# Patient Record
Sex: FEMALE | Race: OTHER | HISPANIC OR LATINO | Employment: UNEMPLOYED | URBAN - METROPOLITAN AREA
[De-identification: names, ages, dates, MRNs, and addresses within clinical notes are randomized per-mention and may not be internally consistent; named-entity substitution may affect disease eponyms.]

---

## 2018-01-10 NOTE — RESULT NOTES
Verified Results  * XR KNEE 4+ VIEW LEFT 92Lmg0810 01:43PM Shy Hearn Order Number: [id number]     Test Name Result Flag Reference   XR KNEE 4+ VW LEFT (Report)     LEFT KNEE     INDICATION: Left knee swelling     COMPARISON: None     VIEWS: AP, lateral, tunnel and sunrise; 4 images     FINDINGS:     There is no acute fracture or dislocation  Small joint effusion noted  No degenerative changes  No lytic or blastic lesions are seen  Soft tissues are unremarkable  IMPRESSION:     No acute osseous abnormality  Small joint effusion  Workstation performed: [id number]     Signed by:   Maritza Anderson MD   8/4/16

## 2018-01-16 NOTE — RESULT NOTES
Verified Results  (1) SYNOVIAL FLUID CRYSTALS 03Aug2016 03:23PM Mildred Dc   TW Order Number: [id number]     Test Name Result Flag Reference   SYN FL CRYST No Crystals Seen  No Crystals Seen     (1) SYNOVIAL FLUID RBCS 03Aug2016 03:23PM Ny Danger Order Number: [id number]     Test Name Result Flag Reference   RBC, SYNOVIAL 27562 H 0-10     (1) WBC AND DIFF,SYNOVIAL FLUID 03Aug2016 03:23PM Ny Danger Order Number: [id number]     Test Name Result Flag Reference   WBC FLUID 8810 /ul H 0-200   TOTAL COUNTED 100     Neutrophil % (synovial) 83 %     Lymph % (Synovial) 13 %     Monocyte % (Synovial) 4 %       (1) CULTURE, BODY FLUID 03Aug2016 03:23PM Ny Danger Order Number: [id number]     Test Name Result Flag Reference   CLINICAL REPORT (Report)     Test:        Body fluid culture  Specimen Type: Body Fluid  Specimen Date:   8/3/2016 3:23 PM  Result Date:    8/6/2016 8:19 AM  Result Status:   Final result  Resulting Lab:   [place] [address]            Tel: [phone]      CULTURE                                       ------------------                                   No growth      STAIN                                        ------------------                                   1+ Polys    No bacteria seen     (1) LYME PCR 03Aug2016 03:23PM Ny Danger Order Number: [id number]     Test Name Result Flag Reference   LYME PCR SP RES Negative  Negative   No B  burgdorferi DNA Detected  This test was developed and its performance characteristics determined  by [facility]   It has not been cleared or approved by the Food and Drug  Administration  The FDA has determined that such clearance or  approval is not necessary      Performed at:  [address]  [id number]  : Donald Tang MD, Phone:  [phone]

## 2018-12-13 ENCOUNTER — APPOINTMENT (OUTPATIENT)
Dept: URGENT CARE | Facility: MEDICAL CENTER | Age: 23
End: 2018-12-13

## 2018-12-13 ENCOUNTER — TRANSCRIBE ORDERS (OUTPATIENT)
Dept: URGENT CARE | Facility: MEDICAL CENTER | Age: 23
End: 2018-12-13

## 2018-12-13 ENCOUNTER — APPOINTMENT (OUTPATIENT)
Dept: LAB | Facility: MEDICAL CENTER | Age: 23
End: 2018-12-13

## 2018-12-13 DIAGNOSIS — Z02.1 PRE-EMPLOYMENT HEALTH SCREENING EXAMINATION: ICD-10-CM

## 2018-12-13 DIAGNOSIS — Z02.1 PRE-EMPLOYMENT HEALTH SCREENING EXAMINATION: Primary | ICD-10-CM

## 2018-12-13 LAB — RUBV IGG SERPL IA-ACNC: 91.5 IU/ML

## 2018-12-13 PROCEDURE — 90715 TDAP VACCINE 7 YRS/> IM: CPT

## 2018-12-13 PROCEDURE — 86735 MUMPS ANTIBODY: CPT

## 2018-12-13 PROCEDURE — 86765 RUBEOLA ANTIBODY: CPT

## 2018-12-13 PROCEDURE — 86762 RUBELLA ANTIBODY: CPT

## 2018-12-13 PROCEDURE — 86787 VARICELLA-ZOSTER ANTIBODY: CPT

## 2018-12-13 PROCEDURE — 86480 TB TEST CELL IMMUN MEASURE: CPT

## 2018-12-13 PROCEDURE — 36415 COLL VENOUS BLD VENIPUNCTURE: CPT

## 2018-12-13 PROCEDURE — 90686 IIV4 VACC NO PRSV 0.5 ML IM: CPT

## 2018-12-17 LAB
GAMMA INTERFERON BACKGROUND BLD IA-ACNC: 0.01 IU/ML
M TB IFN-G BLD-IMP: NEGATIVE
M TB IFN-G CD4+ BCKGRND COR BLD-ACNC: 0 IU/ML
M TB IFN-G CD4+ BCKGRND COR BLD-ACNC: 0 IU/ML
MITOGEN IGNF BCKGRD COR BLD-ACNC: >10 IU/ML
VZV IGG SER IA-ACNC: ABNORMAL

## 2018-12-18 LAB
MEV IGG SER QL: NORMAL
MUV IGG SER QL: NORMAL

## 2019-03-05 ENCOUNTER — OFFICE VISIT (OUTPATIENT)
Dept: URGENT CARE | Facility: MEDICAL CENTER | Age: 24
End: 2019-03-05
Payer: COMMERCIAL

## 2019-03-05 VITALS
HEIGHT: 65 IN | RESPIRATION RATE: 16 BRPM | OXYGEN SATURATION: 98 % | SYSTOLIC BLOOD PRESSURE: 120 MMHG | WEIGHT: 134.8 LBS | BODY MASS INDEX: 22.46 KG/M2 | DIASTOLIC BLOOD PRESSURE: 56 MMHG | HEART RATE: 75 BPM | TEMPERATURE: 96.9 F

## 2019-03-05 DIAGNOSIS — Z02.4 DRIVER'S PERMIT PHYSICAL EXAMINATION: Primary | ICD-10-CM

## 2019-03-06 NOTE — PATIENT INSTRUCTIONS
Normal 's permit physical exam   Patient's form completed with no restrictions at this time  Normal Exam   WHAT YOU NEED TO KNOW:   Your healthcare provider did not find a reason for your symptoms today  You may need to follow up with your healthcare provider or a specialist  He will work with you to try to find the cause of your symptoms  He may also run tests to find out more about your overall health  DISCHARGE INSTRUCTIONS:   Follow up with your healthcare provider or a specialist as directed:  Tell your healthcare provider about your symptoms  You may be given a complete physical exam and health checkup  Write down your questions so you remember to ask them during your visits  Maintain a healthy lifestyle:  Healthy foods and regular physical activity can improve your health  They also decrease your risk of heart disease, high blood pressure, and diabetes  · Get 30 minutes of activity every day  most days of the week  Ask your healthcare provider which activities are best for you  You can do 30 minutes at once or spread your activity throughout the day to get the recommended amount  · Eat a variety of healthy foods  Healthy foods include whole-grain breads, low-fat dairy products, beans, lean meats, and fish  Eat fruits and vegetables every day, especially those that are green, orange, and red  · Maintain a healthy weight  Ask your healthcare provider how much you should weigh  Ask him to help you create a weight loss plan if you are overweight  · Limit alcohol  Women should limit alcohol to 1 drink a day  Men should limit alcohol to 2 drinks a day  A drink of alcohol is 12 ounces of beer, 5 ounces of wine, or 1½ ounces of liquor  Do not smoke: If you smoke, it is never too late to quit  You lower your risk for many health problems if you quit  Ask your healthcare provider for information if you need help quitting     Contact your healthcare provider if:   · Your symptoms get worse, or you have new symptoms that bother you  · You have questions or concerns about your condition or care  · Your illness makes it difficult to follow a healthy diet  Return to the emergency department if:   · You have trouble breathing  · You have chest pain  · You feel lightheaded or faint  © 2017 [employer] Information is for End User's use only and may not be sold, redistributed or otherwise used for commercial purposes  All illustrations and images included in CareNotes® are the copyrighted property of [email or web address] , [employer]  or [employer]  The above information is an  only  It is not intended as medical advice for individual conditions or treatments  Talk to your doctor, nurse or pharmacist before following any medical regimen to see if it is safe and effective for you

## 2019-03-06 NOTE — PROGRESS NOTES
[address] [facility]        NAME: Pascual Galvan is a 26 y o  female  : 1995    MRN: [id number]  DATE: 2019  TIME: 7:46 PM    Assessment and Plan   's permit physical examination [Z02 4]  1  's permit physical examination           Patient Instructions       Follow up with PCP in 3-5 days  Proceed to  ER if symptoms worsen  Chief Complaint     Chief Complaint   Patient presents with    Annual Exam     Patient is here for 's permit physical exam          History of Present Illness       Patient here for 's permit physical exam   Denies any complaints  No medical problems  Review of Systems   Review of Systems   Constitutional: Negative  Respiratory: Negative  Cardiovascular: Negative  Neurological: Negative  All other systems reviewed and are negative  Current Medications     No current outpatient medications on file  Current Allergies     Allergies as of 2019    (No Known Allergies)            The following portions of the patient's history were reviewed and updated as appropriate: allergies, current medications, past family history, past medical history, past social history, past surgical history and problem list      History reviewed  No pertinent past medical history  Past Surgical History:   Procedure Laterality Date    SPINAL FUSION         No family history on file  Medications have been verified  Objective   /56   Pulse 75   Temp (!) 96 9 °F (36 1 °C) (Tympanic)   Resp 16   Ht 5' 5" (1 651 m)   Wt 61 1 kg (134 lb 12 8 oz)   LMP 2019   SpO2 98%   BMI 22 43 kg/m²        Physical Exam     Physical Exam   Constitutional: She is oriented to person, place, and time  Eyes: Pupils are equal, round, and reactive to light  EOM are normal    Cardiovascular: Normal rate, regular rhythm and normal heart sounds     Pulmonary/Chest: Effort normal and breath sounds normal    Neurological: She is alert and oriented to person, place, and time  Nursing note and vitals reviewed

## 2019-06-10 ENCOUNTER — APPOINTMENT (EMERGENCY)
Dept: RADIOLOGY | Facility: HOSPITAL | Age: 24
End: 2019-06-10
Payer: COMMERCIAL

## 2019-06-10 ENCOUNTER — HOSPITAL ENCOUNTER (EMERGENCY)
Facility: HOSPITAL | Age: 24
Discharge: HOME | End: 2019-06-10
Attending: EMERGENCY MEDICINE
Payer: COMMERCIAL

## 2019-06-10 VITALS
WEIGHT: 134.7 LBS | BODY MASS INDEX: 22.44 KG/M2 | OXYGEN SATURATION: 98 % | TEMPERATURE: 97.7 F | RESPIRATION RATE: 18 BRPM | HEART RATE: 74 BPM | SYSTOLIC BLOOD PRESSURE: 111 MMHG | DIASTOLIC BLOOD PRESSURE: 65 MMHG | HEIGHT: 65 IN

## 2019-06-10 DIAGNOSIS — M25.551 RIGHT HIP PAIN: Primary | ICD-10-CM

## 2019-06-10 LAB — EXT PREG TEST URINE: NEGATIVE

## 2019-06-10 PROCEDURE — 99284 EMERGENCY DEPT VISIT MOD MDM: CPT

## 2019-06-10 PROCEDURE — 81025 URINE PREGNANCY TEST: CPT | Performed by: EMERGENCY MEDICINE

## 2019-06-10 PROCEDURE — 99284 EMERGENCY DEPT VISIT MOD MDM: CPT | Performed by: EMERGENCY MEDICINE

## 2019-06-10 PROCEDURE — 73502 X-RAY EXAM HIP UNI 2-3 VIEWS: CPT

## 2019-06-10 PROCEDURE — 96372 THER/PROPH/DIAG INJ SC/IM: CPT

## 2019-06-10 PROCEDURE — 73700 CT LOWER EXTREMITY W/O DYE: CPT

## 2019-06-10 RX ORDER — ACETAMINOPHEN 325 MG/1
975 TABLET ORAL ONCE
Status: COMPLETED | OUTPATIENT
Start: 2019-06-10 | End: 2019-06-10

## 2019-06-10 RX ORDER — KETOROLAC TROMETHAMINE 30 MG/ML
15 INJECTION, SOLUTION INTRAMUSCULAR; INTRAVENOUS ONCE
Status: COMPLETED | OUTPATIENT
Start: 2019-06-10 | End: 2019-06-10

## 2019-06-10 RX ORDER — NAPROXEN 375 MG/1
375 TABLET ORAL 2 TIMES DAILY WITH MEALS
Qty: 20 TABLET | Refills: 0 | Status: SHIPPED | OUTPATIENT
Start: 2019-06-10 | End: 2019-08-26 | Stop reason: DRUGHIGH

## 2019-06-10 RX ADMIN — KETOROLAC TROMETHAMINE 15 MG: 30 INJECTION, SOLUTION INTRAMUSCULAR at 21:05

## 2019-06-10 RX ADMIN — ACETAMINOPHEN 975 MG: 325 TABLET ORAL at 20:26

## 2019-06-19 ENCOUNTER — TELEPHONE (OUTPATIENT)
Dept: OBGYN CLINIC | Facility: HOSPITAL | Age: 24
End: 2019-06-19

## 2019-06-19 NOTE — TELEPHONE ENCOUNTER
Caller: Apollo Patton  Call back Number: [phone]  Provider: Dr Taryn Oropeza called the phone room and stated she will drop off Medical records for her Right Hip SX before her appointment with Dr Edmund Peterson on July 9th  Ash Haro could not remember exactly where the place was called but will be getting them from that office this weekend  Ash Haro works in the same [employer] and stated she will just drop them off when she has them to Ortho office blanca Mckeon for Dr Melisa Khoury

## 2019-07-09 ENCOUNTER — OFFICE VISIT (OUTPATIENT)
Dept: OBGYN CLINIC | Facility: HOSPITAL | Age: 24
End: 2019-07-09
Payer: COMMERCIAL

## 2019-07-09 ENCOUNTER — HOSPITAL ENCOUNTER (OUTPATIENT)
Dept: RADIOLOGY | Facility: HOSPITAL | Age: 24
Discharge: HOME | End: 2019-07-09
Attending: ORTHOPAEDIC SURGERY
Payer: COMMERCIAL

## 2019-07-09 VITALS
BODY MASS INDEX: 22.79 KG/M2 | HEIGHT: 65 IN | SYSTOLIC BLOOD PRESSURE: 112 MMHG | WEIGHT: 136.8 LBS | DIASTOLIC BLOOD PRESSURE: 73 MMHG | HEART RATE: 86 BPM

## 2019-07-09 DIAGNOSIS — M25.562 ACUTE PAIN OF LEFT KNEE: Primary | ICD-10-CM

## 2019-07-09 DIAGNOSIS — M25.562 ACUTE PAIN OF LEFT KNEE: ICD-10-CM

## 2019-07-09 DIAGNOSIS — M25.462 EFFUSION OF LEFT KNEE: ICD-10-CM

## 2019-07-09 PROCEDURE — 20610 DRAIN/INJ JOINT/BURSA W/O US: CPT | Performed by: PHYSICIAN ASSISTANT

## 2019-07-09 PROCEDURE — 99213 OFFICE O/P EST LOW 20 MIN: CPT | Performed by: ORTHOPAEDIC SURGERY

## 2019-07-09 PROCEDURE — 73562 X-RAY EXAM OF KNEE 3: CPT

## 2019-07-09 RX ORDER — BUPIVACAINE HYDROCHLORIDE 2.5 MG/ML
2 INJECTION, SOLUTION INFILTRATION; PERINEURAL
Status: COMPLETED | OUTPATIENT
Start: 2019-07-09 | End: 2019-07-09

## 2019-07-09 RX ADMIN — BUPIVACAINE HYDROCHLORIDE 2 ML: 2.5 INJECTION, SOLUTION INFILTRATION; PERINEURAL at 16:24

## 2019-07-09 NOTE — PROGRESS NOTES
Orthopedics          Donnamarie Babinski 22 y o  female MRN: [id number]      Chief Complaint:   left knee pain    HPI:   22 y  o female complaining of left knee pain  Patient presents office today regarding left knee pain and swelling  Patient has noticed increasing swelling and pain in her left knee over the past month's time  Patient states having pain stiffness in her left knee  She does have a history of knee effusion 4 years ago she had aspirated she states she is not any issues with her left knee until recently  She has limited motion in her left knee as well as pain with weight-bearing clicking popping and occasional locking symptoms of her left knee  She denies any radiation pain denies any numbness tingling left lower extremity denies any fevers chills  Review Of Systems:   · Skin: Normal  · Neuro: See HPI  · Musculoskeletal: See HPI  · All other systems reviewed and are negative    Past Medical History:   History reviewed  No pertinent past medical history      Past Surgical History:   Past Surgical History:   Procedure Laterality Date    HIP SURGERY      SPINAL FUSION  2010       Family History:  Family history reviewed and non-contributory  Family History   Problem Relation Age of Onset    No Known Problems Mother     Hypertension Father          Social History:  Social History     Socioeconomic History    Marital status: Single     Spouse name: None    Number of children: None    Years of education: None    Highest education level: None   Occupational History    None   Social Needs    Financial resource strain: None    Food insecurity:     Worry: None     Inability: None    Transportation needs:     Medical: None     Non-medical: None   Tobacco Use    Smoking status: Never Smoker    Smokeless tobacco: Never Used   Substance and Sexual Activity    Alcohol use: Yes     Comment: occasional    Drug use: Not Currently    Sexual activity: None   Lifestyle    Physical activity:     Days per week: None     Minutes per session: None    Stress: None   Relationships    Social connections:     Talks on phone: None     Gets together: None     Attends Congregation service: None     Active member of club or organization: None     Attends meetings of clubs or organizations: None     Relationship status: None    Intimate partner violence:     Fear of current or ex partner: None     Emotionally abused: None     Physically abused: None     Forced sexual activity: None   Other Topics Concern    None   Social History Narrative    None       Allergies:   No Known Allergies    Labs:  No results found for: HCT, HGB, PT, INR, WBC, ESR, CRP    Meds:    Current Outpatient Medications:     naproxen (NAPROSYN) 375 mg tablet, Take 1 tablet (375 mg total) by mouth 2 (two) times a day with meals, Disp: 20 tablet, Rfl: 0      Physical Exam:     General Appearance:    Alert, cooperative, no distress, appears stated age   Head:    Normocephalic, without obvious abnormality, atraumatic   Eyes:    conjunctiva/corneas clear, both eyes        Nose:   Nares normal, septum midline, no drainage    Throat:   Lips normal; teeth and gums normal   Neck:    symmetrical, trachea midline, ;     thyroid:  no enlargement/   Back:     Symmetric, no curvature, ROM normal   Lungs:   No audible wheezing or labored breathing   Chest Wall:    No tenderness or deformity    Heart:    Regular rate and rhythm               Pulses:   2+ and symmetric all extremities   Skin:   Skin color, texture, turgor normal, no rashes or lesions   Neurologic:   normal strength, sensation and reflexes     throughout       Musculoskeletal: left lower extremity  · Prior to aspiration On examination of the left knee there is a large effusion no erythema no laceration no abrasion no ecchymosis  Range of motion lacking 10° of extension flexion to 120°    There is no pain on palpation medial and lateral joint lines, pes anserine bursa region, distal iliotibial band  No pain or laxity with varus or valgus stress  Quadriceps, hamstring strength 5/5  Sensation intact, distal pulses present  Radiology:   I personally reviewed the films  X-rays left knee shows no evidence of osseous abnormality    Large joint arthrocentesis: L knee  Date/Time: 7/9/2019 4:24 PM  Consent given by: patient  Supporting Documentation  Indications: pain   Procedure Details  Location: knee - L knee  Needle size: 22 G  Ultrasound guidance: no  Approach: superior  Medications administered: 2 mL bupivacaine 0 25 %    Aspirate amount: 35 mL  Aspirate: clear and yellow  Analysis: fluid sample sent for laboratory analysis    Patient tolerance: patient tolerated the procedure well with no immediate complications  Dressing:  Sterile dressing applied            _*_*_*_*_*_*_*_*_*_*_*_*_*_*_*_*_*_*_*_*_*_*_*_*_*_*_*_*_*_*_*_*_*_*_*_*_*_*_*_*_*    Assessment:  22 y  o female with left knee pain and effusion    Plan:   · Weight bearing as tolerated  left lower extremity  · Left knee aspiration injection of bupivacaine given as noted above  · Patient advised should they develop any increasing pain, redness, drainage, numbness, tingling or swelling surrounding the injection sight, they are to contact our office or present to the emergency department    · Fluid sent for laboratory analysis Gram stain, culture, anaerobi,c aerobic, fungal, AFB  · MRI of left knee ordered  · Follow up when testing is complete

## 2019-07-10 ENCOUNTER — LAB REQUISITION (OUTPATIENT)
Dept: LAB | Facility: HOSPITAL | Age: 24
End: 2019-07-10
Payer: COMMERCIAL

## 2019-07-10 DIAGNOSIS — M25.462 EFFUSION OF LEFT KNEE: ICD-10-CM

## 2019-07-10 LAB
CRYSTALS SNV QL MICRO: NORMAL
LYMPHOCYTES # SNV MANUAL: 12 %
MONOCYTES NFR SNV MANUAL: 15 %
NEUTROPHILS NFR SNV MANUAL: 73 %
RBC # SNV MANUAL: ABNORMAL /UL (ref 0–10)
TOTAL CELLS COUNTED SPEC: 100
WBC # FLD MANUAL: NORMAL /UL

## 2019-07-10 PROCEDURE — 87070 CULTURE OTHR SPECIMN AEROBIC: CPT | Performed by: ORTHOPAEDIC SURGERY

## 2019-07-10 PROCEDURE — 87075 CULTR BACTERIA EXCEPT BLOOD: CPT | Performed by: ORTHOPAEDIC SURGERY

## 2019-07-10 PROCEDURE — 89051 BODY FLUID CELL COUNT: CPT | Performed by: ORTHOPAEDIC SURGERY

## 2019-07-10 PROCEDURE — 87205 SMEAR GRAM STAIN: CPT | Performed by: ORTHOPAEDIC SURGERY

## 2019-07-10 PROCEDURE — 89060 EXAM SYNOVIAL FLUID CRYSTALS: CPT | Performed by: ORTHOPAEDIC SURGERY

## 2019-07-13 LAB
BACTERIA SPEC ANAEROBE CULT: NO GROWTH
BACTERIA SPEC BFLD CULT: NO GROWTH
GRAM STN SPEC: NORMAL
GRAM STN SPEC: NORMAL

## 2019-07-16 ENCOUNTER — OFFICE VISIT (OUTPATIENT)
Dept: OBGYN CLINIC | Facility: CLINIC | Age: 24
End: 2019-07-16
Payer: COMMERCIAL

## 2019-07-16 VITALS
HEART RATE: 101 BPM | BODY MASS INDEX: 22.56 KG/M2 | OXYGEN SATURATION: 96 % | HEIGHT: 65 IN | SYSTOLIC BLOOD PRESSURE: 126 MMHG | DIASTOLIC BLOOD PRESSURE: 78 MMHG | WEIGHT: 135.4 LBS

## 2019-07-16 DIAGNOSIS — N92.6 IRREGULAR MENSES: Primary | ICD-10-CM

## 2019-07-16 DIAGNOSIS — R30.0 DYSURIA: ICD-10-CM

## 2019-07-16 DIAGNOSIS — L68.0 HIRSUTISM: ICD-10-CM

## 2019-07-16 DIAGNOSIS — R10.2 PELVIC PAIN: ICD-10-CM

## 2019-07-16 PROBLEM — G89.29 CHRONIC RIGHT HIP PAIN: Status: ACTIVE | Noted: 2019-07-16

## 2019-07-16 PROBLEM — M25.551 CHRONIC RIGHT HIP PAIN: Status: ACTIVE | Noted: 2019-07-16

## 2019-07-16 LAB
SL AMB  POCT GLUCOSE, UA: NEGATIVE
SL AMB LEUKOCYTE ESTERASE,UA: NEGATIVE
SL AMB POCT BILIRUBIN,UA: NEGATIVE
SL AMB POCT BLOOD,UA: NEGATIVE
SL AMB POCT CLARITY,UA: NORMAL
SL AMB POCT COLOR,UA: YELLOW
SL AMB POCT KETONES,UA: NEGATIVE
SL AMB POCT NITRITE,UA: NEGATIVE
SL AMB POCT PH,UA: 7.5
SL AMB POCT SPECIFIC GRAVITY,UA: 1.01
SL AMB POCT URINE PROTEIN: NEGATIVE
SL AMB POCT UROBILINOGEN: 0.2

## 2019-07-16 PROCEDURE — 87147 CULTURE TYPE IMMUNOLOGIC: CPT | Performed by: OBSTETRICS & GYNECOLOGY

## 2019-07-16 PROCEDURE — 87086 URINE CULTURE/COLONY COUNT: CPT | Performed by: OBSTETRICS & GYNECOLOGY

## 2019-07-16 PROCEDURE — 81002 URINALYSIS NONAUTO W/O SCOPE: CPT | Performed by: OBSTETRICS & GYNECOLOGY

## 2019-07-16 PROCEDURE — 99203 OFFICE O/P NEW LOW 30 MIN: CPT | Performed by: OBSTETRICS & GYNECOLOGY

## 2019-07-16 NOTE — PROGRESS NOTES
Patient is a 22 y o  No obstetric history on file  with Patient's last menstrual period was 2019 (exact date)  who presents requesting evaluation of pelvic pain  Pt reports she used to take OCPS until 2018  She reports in 2018 she developed right sided pelvic pain and it has been getting worse since that time  She reports she notes increased weight gain, facial hair, mood swings and irregular menses  She reports she had a menses in February that only consisted of menses and she did not have any menses until July  She reports that originally she went on the pill in  as pregnancy prevention  She reports she stopped it because she felt depressed on the pill  Pt reports menarche at age 16  She reports her menses were irregular for the first year and then they became regular  She reports monthly menses until 2018  Pt reports she never had facial hair prior to this as well  She reports that the pain on her right side is intermittent and it is sharp and stabbing in nature  She reports that it is exacerbating by increased activity and she has to urinate  She has not had any prior evaluation  With regards to the hair growth, pt reports she has coarse hair on her inner thighs  She reports notes increased chest hair and abdominal hair as well  This is all new since October  She reports she has had to change the way she dresses to cover the hair  She reports she waxes the hair to remove it  She also reports she has had an increase in acne and it is very cystic in nature       Past Medical History:   Diagnosis Date    Knee effusion, left     Need for HPV vaccination     completed series    Scoliosis     Varicella vaccination        Past Surgical History:   Procedure Laterality Date    HIP SURGERY Right     removal of benign bony growth    SPINAL FUSION         OB History    Para Term  AB Living   0 0 0 0 0 0   SAB TAB Ectopic Multiple Live Births   0 0 0 0 0   Obstetric Comments   Menarche: 16           Current Outpatient Medications:     naproxen (NAPROSYN) 375 mg tablet, Take 1 tablet (375 mg total) by mouth 2 (two) times a day with meals, Disp: 20 tablet, Rfl: 0    No Known Allergies    Social History     Socioeconomic History    Marital status: Single     Spouse name: None    Number of children: 0    Years of education: currently in college    Highest education level: High school graduate   Occupational History    Occupation: Nutrition Services   Social Needs    Financial resource strain: None    Food insecurity:     Worry: None     Inability: None    Transportation needs:     Medical: None     Non-medical: None   Tobacco Use    Smoking status: Never Smoker    Smokeless tobacco: Never Used   Substance and Sexual Activity    Alcohol use: Yes     Frequency: Monthly or less     Comment: 1-2x/year    Drug use: Not Currently     Types: Marijuana    Sexual activity: Yes     Partners: Male     Birth control/protection: Condom     Comment: lifetime partners: 5; current partner: 2015   Lifestyle    Physical activity:     Days per week: None     Minutes per session: None    Stress: None   Relationships    Social connections:     Talks on phone: None     Gets together: None     Attends Church service: None     Active member of club or organization: None     Attends meetings of clubs or organizations: None     Relationship status: None    Intimate partner violence:     Fear of current or ex partner: None     Emotionally abused: None     Physically abused: None     Forced sexual activity: None   Other Topics Concern    None   Social History Narrative    Anglican: No preference    Accepts blood products       Family History   Problem Relation Age of Onset    Other Mother         pre-diabetes    Hypertension Father     No Known Problems Sister     No Known Problems Brother     Emphysema Maternal Grandfather     No Known Problems Paternal Grandmother  Other Paternal Grandfather         going blind and deaf    Breast cancer Neg Hx     Ovarian cancer Neg Hx     Colon cancer Neg Hx        Review of Systems   Constitutional: Positive for unexpected weight change  Negative for chills, fatigue and fever  HENT: Negative for congestion, mouth sores and sore throat  Respiratory: Negative for cough, chest tightness, shortness of breath and wheezing  Cardiovascular: Negative for chest pain and palpitations  Gastrointestinal: Negative for abdominal distention, abdominal pain, constipation, diarrhea, nausea and vomiting  Endocrine: Negative for cold intolerance and heat intolerance  Genitourinary: Positive for menstrual problem  Negative for dyspareunia, dysuria, genital sores, pelvic pain, vaginal bleeding, vaginal discharge and vaginal pain  Musculoskeletal: Negative for arthralgias  Skin: Negative for color change and rash  Neurological: Negative for dizziness, light-headedness and headaches  Hematological: Negative for adenopathy  Blood pressure 126/78, pulse 101, height 5' 5" (1 651 m), weight 61 4 kg (135 lb 6 4 oz), last menstrual period 07/06/2019, SpO2 96 %, not currently breastfeeding  and Body mass index is 22 53 kg/m²  Physical Exam   Constitutional: She is oriented to person, place, and time  She appears well-developed and well-nourished  HENT:   Head: Normocephalic and atraumatic  Eyes: Conjunctivae and EOM are normal    Neck: Normal range of motion  Pulmonary/Chest: Effort normal    Abdominal: Soft  Bowel sounds are normal  She exhibits no distension and no mass  There is no tenderness  There is no rebound and no guarding  Musculoskeletal: Normal range of motion  She exhibits no edema or tenderness  Neurological: She is alert and oriented to person, place, and time  Skin: Skin is warm  No rash noted  No erythema  Psychiatric: She has a normal mood and affect   Her behavior is normal  Judgment and thought content normal      Breasts with fine dark hair, abdomen with fine dark hair--no evidence of hirsutism in these areas  Thick coarse hairs on inner and back of thighs  Facial hair removed by patient prior to arrival     vulva: normal external genitalia for age and no lesions, masses, epithelial changes, or exudate  vagina: color pink and rugae  well formed rugae  cervix: nullip and no lesions   uterus: NSSC, AF, NT, mobile  adnexa: no masses or tenderness      A/P:  Pt is a 22 y o  No obstetric history on file  with      Diagnoses and all orders for this visit:    Irregular menses  -     17-Hydroxyprogesterone; Future  -     DHEA-sulfate; Future  -     Estradiol; Future  -     Follicle stimulating hormone; Future  -     Glucose CARMELA 2HR 75GM Nonpreg; Future  -     Lipid panel; Future  -     Luteinizing hormone; Future  -     Prolactin; Future  -     Sex Hormone Binding Globulin; Future  -     Testosterone, free, total; Future  -     TSH, 3rd generation with Free T4 reflex; Future  -     US pelvis complete w transvaginal; Future    Hirsutism  -     17-Hydroxyprogesterone; Future  -     DHEA-sulfate; Future  -     Estradiol; Future  -     Follicle stimulating hormone; Future  -     Glucose CARMELA 2HR 75GM Nonpreg; Future  -     Lipid panel; Future  -     Luteinizing hormone; Future  -     Prolactin; Future  -     Sex Hormone Binding Globulin; Future  -     Testosterone, free, total; Future  -     TSH, 3rd generation with Free T4 reflex; Future    Pelvic pain  -     US pelvis complete w transvaginal; Future    Dysuria  -     Urine culture  -     POCT urine dip      Follow up in 2 weeks to review results and discuss management options

## 2019-07-18 LAB
BACTERIA UR CULT: ABNORMAL
BACTERIA UR CULT: ABNORMAL

## 2019-07-22 ENCOUNTER — HOSPITAL ENCOUNTER (OUTPATIENT)
Dept: RADIOLOGY | Facility: HOSPITAL | Age: 24
Discharge: HOME | End: 2019-07-22
Attending: PHYSICIAN ASSISTANT
Payer: COMMERCIAL

## 2019-07-22 DIAGNOSIS — M25.462 EFFUSION OF LEFT KNEE: ICD-10-CM

## 2019-07-22 DIAGNOSIS — M25.562 ACUTE PAIN OF LEFT KNEE: ICD-10-CM

## 2019-07-22 PROCEDURE — 73721 MRI JNT OF LWR EXTRE W/O DYE: CPT

## 2019-07-25 ENCOUNTER — TRANSCRIBE ORDERS (OUTPATIENT)
Dept: RADIOLOGY | Facility: HOSPITAL | Age: 24
End: 2019-07-25

## 2019-07-25 ENCOUNTER — HOSPITAL ENCOUNTER (OUTPATIENT)
Dept: RADIOLOGY | Facility: HOSPITAL | Age: 24
Discharge: HOME | End: 2019-07-25
Attending: OBSTETRICS & GYNECOLOGY
Payer: COMMERCIAL

## 2019-07-25 DIAGNOSIS — R10.2 PELVIC PAIN: ICD-10-CM

## 2019-07-25 DIAGNOSIS — N92.6 IRREGULAR MENSES: ICD-10-CM

## 2019-07-25 PROCEDURE — 76856 US EXAM PELVIC COMPLETE: CPT

## 2019-08-01 ENCOUNTER — OFFICE VISIT (OUTPATIENT)
Dept: OBGYN CLINIC | Facility: HOSPITAL | Age: 24
End: 2019-08-01
Payer: COMMERCIAL

## 2019-08-01 VITALS
HEART RATE: 73 BPM | HEIGHT: 65 IN | WEIGHT: 135 LBS | DIASTOLIC BLOOD PRESSURE: 75 MMHG | BODY MASS INDEX: 22.49 KG/M2 | SYSTOLIC BLOOD PRESSURE: 109 MMHG

## 2019-08-01 DIAGNOSIS — M25.562 ACUTE PAIN OF LEFT KNEE: ICD-10-CM

## 2019-08-01 DIAGNOSIS — M25.462 EFFUSION OF LEFT KNEE: Primary | ICD-10-CM

## 2019-08-01 PROCEDURE — 99213 OFFICE O/P EST LOW 20 MIN: CPT | Performed by: ORTHOPAEDIC SURGERY

## 2019-08-01 NOTE — PROGRESS NOTES
Assessment:    Left knee inflammatory arthritis    Plan:     Weight Bearing  as Tolerated   Patient referred to Rheumatology   Continue wearing knee sleeve for comfort   If symptoms worsen, I did explain to patient that she may be candidate for steroid injection   Continued continue taking Aleve p r n  For pain   Follow-up p r n  The above stated was discussed in layman's terms and the patient expressed understanding  All questions were answered to the patient's satisfaction  Subjective:   Bianca Moralez is a 22 y o  female who presents left knee pain  Patient is here today to discuss MRI of left knee  Patient states left knee is getting worse  She states she is having pain over the knee anterior lateral and posterior left knee  She does note  Increased swelling     Denies any instability or locking  Pain is worse with prolonged standing, walking, and sitting  Patient is taking Aleve p r n  For pain  Patient is wearing a knee sleeve compression brace with relief        Review of systems negative unless otherwise specified in HPI    Past Medical History:   Diagnosis Date    Knee effusion, left     Need for HPV vaccination 2012    completed series    Scoliosis     Varicella vaccination        Past Surgical History:   Procedure Laterality Date    HIP SURGERY Right     removal of benign bony growth    SPINAL FUSION  2010       Family History   Problem Relation Age of Onset    Other Mother         pre-diabetes    Hypertension Father     No Known Problems Sister     No Known Problems Brother     Emphysema Maternal Grandfather     No Known Problems Paternal Grandmother     Other Paternal Grandfather         going blind and deaf    Breast cancer Neg Hx     Ovarian cancer Neg Hx     Colon cancer Neg Hx        Social History     Occupational History    Occupation: Nutrition Services   Tobacco Use    Smoking status: Never Smoker    Smokeless tobacco: Never Used   Substance and Sexual Activity    Alcohol use: Yes     Frequency: Monthly or less     Comment: 1-2x/year    Drug use: Not Currently     Types: Marijuana    Sexual activity: Yes     Partners: Male     Birth control/protection: Condom     Comment: lifetime partners: 5; current partner: 2015         Current Outpatient Medications:     naproxen (NAPROSYN) 375 mg tablet, Take 1 tablet (375 mg total) by mouth 2 (two) times a day with meals (Patient not taking: Reported on 8/1/2019), Disp: 20 tablet, Rfl: 0    No Known Allergies         Vitals:    08/01/19 1534   BP: 109/75   Pulse: 73       Objective:            Physical Exam  · General: Awake, Alert, Oriented  · Eyes: Pupils equal, round and reactive to light  · Heart: regular rate and rhythm  · Lungs: No audible wheezing  · Abdomen: soft                    Ortho Exam  Left knee  No lacerations, no abrasions, no open wounds  No erythema  Mild to moderate effusion  No tenderness to palpation  Flexion contracture but goes full with stretching and with passive flexion  Neurovascularly Intact Distally     Diagnostics, reviewed and taken today if performed as documented    The attending physician has personally reviewed the pertinent films in PACS and interpretation is as follows:  MRI left knee:active  inflammatory  arthritis  with  articular  synovitis       Procedures, if performed today:    Procedures    None performed      Scribe Attestation    I,:   Zohaib Baca am acting as a scribe while in the presence of the attending physician :        I,:   Rupert Hawk MD personally performed the services described in this documentation    as scribed in my presence :              Portions of the record may have been created with voice recognition software  Occasional wrong word or "sound a like" substitutions may have occurred due to the inherent limitations of voice recognition software    Read the chart carefully and recognize, using context, where substitutions have occurred

## 2019-08-13 ENCOUNTER — TELEPHONE (OUTPATIENT)
Dept: RHEUMATOLOGY | Facility: CLINIC | Age: 24
End: 2019-08-13

## 2019-08-19 ENCOUNTER — TELEPHONE (OUTPATIENT)
Dept: RHEUMATOLOGY | Facility: CLINIC | Age: 24
End: 2019-08-19

## 2019-08-26 ENCOUNTER — OFFICE VISIT (OUTPATIENT)
Dept: RHEUMATOLOGY | Facility: CLINIC | Age: 24
End: 2019-08-26
Payer: COMMERCIAL

## 2019-08-26 VITALS
HEIGHT: 65 IN | WEIGHT: 135 LBS | DIASTOLIC BLOOD PRESSURE: 76 MMHG | SYSTOLIC BLOOD PRESSURE: 102 MMHG | BODY MASS INDEX: 22.49 KG/M2

## 2019-08-26 DIAGNOSIS — M25.462 EFFUSION OF LEFT KNEE: ICD-10-CM

## 2019-08-26 DIAGNOSIS — M25.562 LEFT KNEE PAIN, UNSPECIFIED CHRONICITY: ICD-10-CM

## 2019-08-26 DIAGNOSIS — M19.90 INFLAMMATORY ARTHRITIS: Primary | ICD-10-CM

## 2019-08-26 PROCEDURE — 20610 DRAIN/INJ JOINT/BURSA W/O US: CPT | Performed by: INTERNAL MEDICINE

## 2019-08-26 PROCEDURE — 99245 OFF/OP CONSLTJ NEW/EST HI 55: CPT | Performed by: INTERNAL MEDICINE

## 2019-08-26 RX ORDER — SULFASALAZINE 500 MG/1
500 TABLET ORAL 2 TIMES DAILY
Qty: 60 TABLET | Refills: 3 | Status: SHIPPED | OUTPATIENT
Start: 2019-08-26 | End: 2019-10-21 | Stop reason: SDUPTHER

## 2019-08-26 RX ORDER — LIDOCAINE HYDROCHLORIDE 10 MG/ML
1 INJECTION, SOLUTION EPIDURAL; INFILTRATION; INTRACAUDAL; PERINEURAL ONCE
Status: COMPLETED | OUTPATIENT
Start: 2019-08-26 | End: 2019-08-26

## 2019-08-26 RX ORDER — TRIAMCINOLONE ACETONIDE 40 MG/ML
40 INJECTION, SUSPENSION INTRA-ARTICULAR; INTRAMUSCULAR ONCE
Status: COMPLETED | OUTPATIENT
Start: 2019-08-26 | End: 2019-08-26

## 2019-08-26 RX ORDER — LIDOCAINE HYDROCHLORIDE 10 MG/ML
1 INJECTION, SOLUTION EPIDURAL; INFILTRATION; INTRACAUDAL; PERINEURAL ONCE
Status: CANCELLED | OUTPATIENT
Start: 2019-08-26 | End: 2019-08-26

## 2019-08-26 RX ORDER — NAPROXEN 500 MG/1
500 TABLET ORAL 2 TIMES DAILY WITH MEALS
Qty: 60 TABLET | Refills: 3 | Status: SHIPPED | OUTPATIENT
Start: 2019-08-26 | End: 2019-10-21 | Stop reason: SDUPTHER

## 2019-08-26 RX ORDER — TRIAMCINOLONE ACETONIDE 40 MG/ML
40 INJECTION, SUSPENSION INTRA-ARTICULAR; INTRAMUSCULAR ONCE
Status: CANCELLED | OUTPATIENT
Start: 2019-08-26 | End: 2019-08-26

## 2019-08-26 RX ADMIN — TRIAMCINOLONE ACETONIDE 40 MG: 40 INJECTION, SUSPENSION INTRA-ARTICULAR; INTRAMUSCULAR at 16:32

## 2019-08-26 RX ADMIN — LIDOCAINE HYDROCHLORIDE 1 ML: 10 INJECTION, SOLUTION EPIDURAL; INFILTRATION; INTRACAUDAL; PERINEURAL at 16:31

## 2019-08-26 NOTE — PATIENT INSTRUCTIONS
Do bloodwork  Steroid injection fiven in left knee given today  Take naproxen 500mg twice a day with food as needed  Take sulfasalazine 500mg bid    Return to clinic in 2 months      Reactive Arthritis    What is reactive arthritis?  Reactive arthritis is a kind of arthritis that happens after certain infections  It causes pain and swelling in joints  Reactive arthritis usually affects people who have or just had:  ?Food poisoning or another kind of infection of the intestines  ? An infection that you catch through sex   In the past, reactive arthritis was sometimes called "Cornelio syndrome "  What are the symptoms of reactive arthritis?  The main symptoms of reactive arthritis are pain and swelling in the joints  These usually happen 1 to 4 weeks after an infection  In most cases, the symptoms affect only a few joints, usually in the knees, ankles, or feet  Other symptoms might include:  ?Pain in the tendons in the feet and ankles (tendons are tough bands of tissue that connect muscles to bones)  ? Irritation of the eye called "conjunctivitis" (also known as pink eye)  ? Pain when urinating  Is there a test for reactive arthritis?  No  There is no test  But if your doctor or nurse can figure out what type of germ caused your infection, he or she should be able to tell if you have reactive arthritis  Your doctor or nurse can test your stool (bowel movements) or urine to look for certain kinds of germs  If your doctor or nurse can't tell what germs caused your infection, he or she will study your symptoms to decide how likely it is that you have reactive arthritis  How is reactive arthritis treated?  The symptoms of reactive arthritis are treated with medicines, including:  ? NSAIDs  This is a large group of medicines that includes ibuprofen (sample brand names: Advil, Motrin) and naproxen (sample brand name: Aleve)   Your doctor or nurse might prescribe a dose higher than you would normally take to relieve pain  ? Other medicines  If NSAIDs do not help your symptoms, your doctor or nurse might give you a shot of steroids  Steroids help reduce inflammation  These are not the same as the steroids some athletes take illegally  Your doctor might also give you more steroids to take at home  There are also other medicines that might help if your symptoms do not get better with NSAIDs or steroids  ? Eye drops  Special drops can help relieve redness and irritation in your eyes  But if you have eye pain or trouble seeing, visit an eye doctor to make sure you don't have a more serious problem  Antibiotics do not usually help with the joint symptoms of reactive arthritis  Even so, your doctor or nurse might prescribe them if you still have an infection  When will I feel better?  Most people with reactive arthritis get better quickly  Some people continue to notice symptoms, either constantly or just once in a while  If your back gets very stiff and sore, see your doctor or nurse   This might mean that your reactive arthritis has turned into a more serious problem

## 2019-08-26 NOTE — PROGRESS NOTES
Assessment and Plan: Majorie Boas is a 22 y o   female who presents as a Rheumatology consult referred by Orthopedics provider Dr Anusha Petit for evaluation of inflammatory arthritis found on recent left knee MRI  I have personally reviewed patient's left knee MRI from July 22, 2019 in PACS, which shows a significant left knee effusion and signs of inflammation  Patient definitely has an inflammatory monoarthritis based on physical exam and MRI, possibly secondary to reactive arthritis of unknown etiology; she may have been exposed to an infection while working in [employer] in [place] back in 2016  Have administered a Kenalog steroid injection into her left knee in clinic today to acutely help with her joint pain and inflammation  Have also started patient on DMARD therapy with sulfasalazine 500 mg p o  B i d  for long-term control of her inflammatory arthritis; this is a relatively safe medication that does not require drug toxicity monitoring and is safe in pregnancy if patient decides to get pregnant  While giving sulfasalazine time to take effect, have also prescribed for patient naproxen 500 mg p  O  B i d  with meals to take as needed; explained to patient that it may take a few months before she starts noticing a difference on sulfasalazine  Have ordered a rheumatoid factor and anti-CCP to evaluate for underlying rheumatoid arthritis  Plan:  Diagnoses and all orders for this visit:    Inflammatory arthritis, monoarticular - possibly secondary to reactive arthritis  -     C-reactive protein  -     Sedimentation rate, automated  -     Vitamin D 25 hydroxy  -     RF Screen w/ Reflex to Titer  -     Cyclic citrul peptide antibody, IgG  -     triamcinolone acetonide (KENALOG-40) 40 mg/mL injection 40 mg  -     lidocaine (PF) (XYLOCAINE-MPF) 1 % injection 1 mL  -     naproxen (NAPROSYN) 500 mg tablet;  Take 1 tablet (500 mg total) by mouth 2 (two) times a day with meals  -     sulfaSALAzine (AZULFIDINE) 500 mg tablet; Take 1 tablet (500 mg total) by mouth 2 (two) times a day    Left knee pain    Left knee effusion    Procedure Note:  Large joint arthrocentesis: L knee  Date/Time: 8/26/2019 2:31 PM  Consent given by: patient  Site marked: site marked  Timeout: Immediately prior to procedure a time out was called to verify the correct patient, procedure, equipment, support staff and site/side marked as required   Supporting Documentation  Indications: joint swelling and pain   Procedure Details  Location: knee - L knee  Preparation: Patient was prepped and draped in the usual sterile fashion  Needle size: 25 G  Ultrasound guidance: no  Approach: lateral    Patient tolerance: patient tolerated the procedure well with no immediate complications  Dressing:  Sterile dressing applied    After discussing the risks/benefits of joint injection, including minor risk of infection, bleeding, pain, or ineffectiveness, informed consent was obtained and patient was agreeable to proceed  Using sterile technique, the left knee was prepped with chloraprep, and was topically anesthetized with Ethyl Chloride  The joint was entered using a lateral approach with a 25 gauge needle  Kenalog 40 mg (1 mL) and lidocaine 1% 1 mL were then injected and the needle withdrawn  The procedure was well tolerated  Call or return to clinic if new symptoms in the area occur or there is failure to improve as anticipated  Patient was instructed to call our office with any concerns or questions  Follow-up plan: Return to clinic in 2 months      HPI  Liliana Serrano is a 22 y o   female who presents as a Rheumatology consult referred by Orthopedics provider Dr Asa Dai for evaluation of inflammatory arthritis found on recent left knee MRI  Patient states that she first started having left knee pain and swelling in the fall of 2016 when she returned from [place] to [place]  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 [employer]    She states that she had a very inflamed left knee at the time, had a left knee arthrocentesis, completed an oral steroid course, and left knee pain and swelling eventually resolved  This year, her left knee pain and swelling started up again in April 2019, notices that it also gets warm and red at times  The pain and swelling is constant, causes her to limp, and limits her activity at work  Of note, she also had right hip surgery in 2014 due to a bone growth  Patient denies any URIs, food poisoning, diarrhea, or psoriasis  A recent urine culture was positive (after her left knee swelling/pain started), but patient does not recall having any UTI symptoms  Review of Systems  Review of Systems   Constitutional: Negative for chills, fatigue, fever and unexpected weight change  HENT: Negative for mouth sores and trouble swallowing  Eyes: Negative for pain and visual disturbance  Respiratory: Negative for cough and shortness of breath  Cardiovascular: Negative for chest pain and leg swelling  Gastrointestinal: Negative for abdominal pain, blood in stool, constipation, diarrhea and nausea  Genitourinary: Negative for hematuria  Musculoskeletal: Positive for arthralgias and joint swelling  Negative for back pain and myalgias  Skin: Negative for rash  Neurological: Negative for weakness and numbness  Hematological: Negative for adenopathy  Psychiatric/Behavioral: Negative for sleep disturbance  Allergies  No Known Allergies    Home Medications    Current Outpatient Medications:     naproxen (NAPROSYN) 500 mg tablet, Take 1 tablet (500 mg total) by mouth 2 (two) times a day with meals, Disp: 60 tablet, Rfl: 3    sulfaSALAzine (AZULFIDINE) 500 mg tablet, Take 1 tablet (500 mg total) by mouth 2 (two) times a day, Disp: 60 tablet, Rfl: 3  No current facility-administered medications for this visit       Past Medical History  Past Medical History:   Diagnosis Date    Knee effusion, left     Need for HPV vaccination 2012    completed series    Scoliosis     Varicella vaccination        Past Surgical History   Past Surgical History:   Procedure Laterality Date    HIP SURGERY Right     removal of benign bony growth    SPINAL FUSION  2010       Family History    Family History   Problem Relation Age of Onset    Other Mother         pre-diabetes    Arthritis Mother     Psoriasis Mother     Hypertension Father     No Known Problems Sister     No Known Problems Brother     Emphysema Maternal Grandfather     No Known Problems Paternal Grandmother     Other Paternal Grandfather         going blind and deaf    Breast cancer Neg Hx     Ovarian cancer Neg Hx     Colon cancer Neg Hx      Mother - psoriatic arthritis, controlled    Social History  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[place] [address] History     Substance and Sexual Activity   Alcohol Use Yes    Frequency: Monthly or less    Comment: 1-2x/year     Social History     Substance and Sexual Activity   Drug Use Not Currently    Types: Marijuana     Social History     Tobacco Use   Smoking Status Former Smoker   Smokeless Tobacco Never Used       Objective:  Vitals:    08/26/19 1430   BP: 102/76   Weight: 61 2 kg (135 lb)   Height: 5' 5" (1 651 m)       Physical Exam   Constitutional: She appears well-developed and well-nourished  No distress  HENT:   Head: Normocephalic and atraumatic  Mouth/Throat: Oropharynx is clear and moist and mucous membranes are normal    Eyes: Conjunctivae and EOM are normal  No scleral icterus  Neck: Neck supple  No spinous process tenderness and no muscular tenderness present  No thyromegaly present  Cardiovascular: Normal rate, regular rhythm, S1 normal and S2 normal  Exam reveals no friction rub  No murmur heard  Pulmonary/Chest: Effort normal and breath sounds normal  No respiratory distress  She has no wheezes  She has no rhonchi  She has no rales  Abdominal: Soft  She exhibits no distension  There is no hepatosplenomegaly  There is no tenderness  Musculoskeletal:   Left knee warmth, swelling, and tenderness   Lymphadenopathy:     She has no cervical adenopathy  Neurological: She is alert  She has normal strength  No sensory deficit  Skin: Skin is warm and dry  No rash noted  Nails show no clubbing  Psychiatric: She has a normal mood and affect  Imaging:   Left Knee MRI w/o Contrast 7/22/19  IMPRESSION:  Given the recent aseptic joint aspiration and prior similar MRI findings in 2016, the current findings are most consistent with active inflammatory arthritis with articular synovitis and two areas of marginal osteitis (pre-erosive disease) at the posterior weightbearing aspect of the medial femoral condyle and posterior aspect of the lateral tibial plateau  Labs:   Office Visit on 07/16/2019   Component Date Value Ref Range Status    Urine Culture 07/16/2019 7684-2454 cfu/ml Beta Hemolytic Streptococcus Group B*  Final    This organism is intrinisically susceptible to Penicillin  If sensitivites to other antibiotics are required, please call the Microbiology Department at [phone] within 5 days      Urine Culture 07/16/2019 70,000-79,000 cfu/ml    Final    Mixed Contaminants X5    LEUKOCYTE ESTERASE,UA 07/16/2019 Negative   Final    NITRITE,UA 07/16/2019 Negative   Final    SL AMB POCT UROBILINOGEN 07/16/2019 0 2   Final    POCT URINE PROTEIN 07/16/2019 Negative   Final     PH,UA 07/16/2019 7 5   Final    BLOOD,UA 07/16/2019 Negative   Final    SPECIFIC GRAVITY,UA 07/16/2019 1 010   Final    KETONES,UA 07/16/2019 Negative   Final    BILIRUBIN,UA 07/16/2019 Negative   Final    GLUCOSE, UA 07/16/2019 Negative   Final     COLOR,UA 07/16/2019 Yellow   Final    CLARITY,UA 07/16/2019 Cloudy   Final   Lab Requisition on 07/10/2019   Component Date Value Ref Range Status    RBC, SYNOVIAL 07/10/2019 11,000* 0 - 10 Final    WBC, Fluid 07/10/2019 16,176  /ul Final    Body Fluid Culture, Sterile 07/10/2019 No growth   Final    Gram Stain Result 07/10/2019 2+ Polys   Final    Gram Stain Result 07/10/2019 No bacteria seen   Final    Anaerobic Culture 07/10/2019 No growth   Final    Crystals, Synovial Fluid 07/10/2019 No Crystals Seen  No Crystals Seen Final    Total Counted 07/10/2019 100   Final    Neutrophil % Synovial 07/10/2019 73  % Final    Lymph % Synovial 07/10/2019 12  % Final    Monocyte % Synovial 07/10/2019 15  % Final   Admission on 06/10/2019, Discharged on 06/10/2019   Component Date Value Ref Range Status    EXT PREG TEST UR (Ref: Negative) 06/10/2019 negative   Final   Appointment on 12/13/2018   Component Date Value Ref Range Status    Varicella IgG 12/13/2018 NON-IMMUNE* IMMUNE Final    Rubeola IgG 12/13/2018 IMMUNE  IMMUNE Final    Rubella IgG Quant 12/13/2018 91 5  >9 9 IU/mL Final    Mumps IgG 12/13/2018 IMMUNE  IMMUNE Final    IMMUNE - Presumed immune to mumps infection   QFT Nil 12/13/2018 0 01  0 - 8 0 IU/ml Final    QFT TB1-NIL 12/13/2018 0 00  IU/ml Final    QFT TB2-NIL 12/13/2018 0 00  IU/ml Final    QFT Mitogen-NIL 12/13/2018 >10 00  IU/ml Final    QFT Final Interpretation 12/13/2018 Negative  Negative Final    No Interferon-gamma response to M  tuberculosis antigens detected  Infection with M  tuberculosis is unlikely  A single negative result does not exclude infection with M  tuberculosis  In patients at high risk for M  tuberculosis infection, a second test should be considered in accordance with the 2017 ATS/IDSA/CDC Clinical Practice Guidelines for Diagnosis of Tuberculosis in Adults and Children  False negative results can be a result of incorrect blood sample collection or handling of the specimen affecting lymphocyte function

## 2019-08-26 NOTE — LETTER
August 26, 2019     Patient: Deonte Carlito   YOB: 1995   Date of Visit: 8/26/2019       To Whom it May Concern:    Geraldsandra Vin is under my professional care  She was seen in my office on 8/26/2019  She may return to work with limitations of having alternating periods of standing and sitting, and no work that is fast-paced, for at least the next two months  This is due to her recently diagnosed inflammatory arthritis, which is being managed by me  If you have any questions or concerns, please don't hesitate to call           Sincerely,          Anson Martino MD        CC: Deonte Esteban

## 2019-09-12 ENCOUNTER — APPOINTMENT (OUTPATIENT)
Dept: LAB | Facility: HOSPITAL | Age: 24
End: 2019-09-12
Attending: OBSTETRICS & GYNECOLOGY
Payer: COMMERCIAL

## 2019-09-12 ENCOUNTER — TRANSCRIBE ORDERS (OUTPATIENT)
Dept: LAB | Facility: HOSPITAL | Age: 24
End: 2019-09-12

## 2019-09-12 DIAGNOSIS — N92.6 IRREGULAR MENSES: ICD-10-CM

## 2019-09-12 DIAGNOSIS — L68.0 HIRSUTISM: ICD-10-CM

## 2019-09-12 LAB
25(OH)D3 SERPL-MCNC: 26.9 NG/ML (ref 30–100)
CHOLEST SERPL-MCNC: 158 MG/DL (ref 50–200)
CRP SERPL QL: 22.4 MG/L
ERYTHROCYTE [SEDIMENTATION RATE] IN BLOOD: 27 MM/HOUR (ref 0–20)
ESTRADIOL SERPL-MCNC: 91 PG/ML
FSH SERPL-ACNC: 9.5 MIU/ML
HDLC SERPL-MCNC: 61 MG/DL (ref 40–60)
LDLC SERPL CALC-MCNC: 87 MG/DL (ref 0–100)
LH SERPL-ACNC: 29.5 MIU/ML
NONHDLC SERPL-MCNC: 97 MG/DL
PROLACTIN SERPL-MCNC: 12.8 NG/ML
TRIGL SERPL-MCNC: 49 MG/DL
TSH SERPL DL<=0.05 MIU/L-ACNC: 0.45 UIU/ML (ref 0.36–3.74)

## 2019-09-12 PROCEDURE — 83001 ASSAY OF GONADOTROPIN (FSH): CPT

## 2019-09-12 PROCEDURE — 84146 ASSAY OF PROLACTIN: CPT

## 2019-09-12 PROCEDURE — 84403 ASSAY OF TOTAL TESTOSTERONE: CPT

## 2019-09-12 PROCEDURE — 84402 ASSAY OF FREE TESTOSTERONE: CPT

## 2019-09-12 PROCEDURE — 36415 COLL VENOUS BLD VENIPUNCTURE: CPT | Performed by: INTERNAL MEDICINE

## 2019-09-12 PROCEDURE — 86430 RHEUMATOID FACTOR TEST QUAL: CPT | Performed by: INTERNAL MEDICINE

## 2019-09-12 PROCEDURE — 82627 DEHYDROEPIANDROSTERONE: CPT

## 2019-09-12 PROCEDURE — 86200 CCP ANTIBODY: CPT | Performed by: INTERNAL MEDICINE

## 2019-09-12 PROCEDURE — 84443 ASSAY THYROID STIM HORMONE: CPT

## 2019-09-12 PROCEDURE — 83498 ASY HYDROXYPROGESTERONE 17-D: CPT

## 2019-09-12 PROCEDURE — 80061 LIPID PANEL: CPT

## 2019-09-12 PROCEDURE — 85652 RBC SED RATE AUTOMATED: CPT | Performed by: INTERNAL MEDICINE

## 2019-09-12 PROCEDURE — 84270 ASSAY OF SEX HORMONE GLOBUL: CPT

## 2019-09-12 PROCEDURE — 86140 C-REACTIVE PROTEIN: CPT | Performed by: INTERNAL MEDICINE

## 2019-09-12 PROCEDURE — 83002 ASSAY OF GONADOTROPIN (LH): CPT

## 2019-09-12 PROCEDURE — 82306 VITAMIN D 25 HYDROXY: CPT | Performed by: INTERNAL MEDICINE

## 2019-09-12 PROCEDURE — 82670 ASSAY OF TOTAL ESTRADIOL: CPT

## 2019-09-13 LAB
DHEA-S SERPL-MCNC: 150.1 UG/DL (ref 110–431.7)
RHEUMATOID FACT SER QL LA: NEGATIVE
SHBG SERPL-SCNC: 72.8 NMOL/L (ref 24.6–122)
TESTOST FREE SERPL-MCNC: 2.3 PG/ML (ref 0–4.2)
TESTOST SERPL-MCNC: 47 NG/DL (ref 8–48)

## 2019-09-15 LAB — CCP IGA+IGG SERPL IA-ACNC: 4 UNITS (ref 0–19)

## 2019-09-18 LAB — 17OHP SERPL-MCNC: 99 NG/DL

## 2019-09-24 ENCOUNTER — TELEPHONE (OUTPATIENT)
Dept: OBGYN CLINIC | Facility: CLINIC | Age: 24
End: 2019-09-24

## 2019-10-21 ENCOUNTER — OFFICE VISIT (OUTPATIENT)
Dept: RHEUMATOLOGY | Facility: CLINIC | Age: 24
End: 2019-10-21
Payer: COMMERCIAL

## 2019-10-21 VITALS
HEIGHT: 65 IN | DIASTOLIC BLOOD PRESSURE: 78 MMHG | SYSTOLIC BLOOD PRESSURE: 112 MMHG | BODY MASS INDEX: 22.49 KG/M2 | WEIGHT: 135 LBS

## 2019-10-21 DIAGNOSIS — G89.29 CHRONIC RIGHT HIP PAIN: ICD-10-CM

## 2019-10-21 DIAGNOSIS — N39.0 URINARY TRACT INFECTION WITHOUT HEMATURIA, SITE UNSPECIFIED: ICD-10-CM

## 2019-10-21 DIAGNOSIS — M19.90 INFLAMMATORY ARTHRITIS: Primary | ICD-10-CM

## 2019-10-21 DIAGNOSIS — M25.551 CHRONIC RIGHT HIP PAIN: ICD-10-CM

## 2019-10-21 PROCEDURE — 99214 OFFICE O/P EST MOD 30 MIN: CPT | Performed by: INTERNAL MEDICINE

## 2019-10-21 RX ORDER — NAPROXEN 500 MG/1
500 TABLET ORAL 2 TIMES DAILY WITH MEALS
Qty: 60 TABLET | Refills: 6 | Status: SHIPPED | OUTPATIENT
Start: 2019-10-21 | End: 2020-04-17 | Stop reason: SDUPTHER

## 2019-10-21 RX ORDER — AMOXICILLIN 500 MG/1
500 CAPSULE ORAL EVERY 8 HOURS SCHEDULED
Qty: 21 CAPSULE | Refills: 0 | Status: SHIPPED | OUTPATIENT
Start: 2019-10-21 | End: 2019-10-28

## 2019-10-21 RX ORDER — SULFASALAZINE 500 MG/1
500 TABLET ORAL 2 TIMES DAILY
Qty: 60 TABLET | Refills: 6 | Status: SHIPPED | OUTPATIENT
Start: 2019-10-21 | End: 2020-04-17 | Stop reason: SDUPTHER

## 2019-10-21 NOTE — PATIENT INSTRUCTIONS
Do bloodwork  Continue naproxen as needed  Take sulfasalazine twice a day    Return to clinic in 6 months    Reactive Arthritis    What is reactive arthritis? -- Reactive arthritis is a kind of arthritis that happens after certain infections  It causes pain and swelling in joints  Reactive arthritis usually affects people who have or just had:  ?Food poisoning or another kind of infection of the intestines  ? An infection that you catch through sex   In the past, reactive arthritis was sometimes called "Cornelio syndrome "  What are the symptoms of reactive arthritis? -- The main symptoms of reactive arthritis are pain and swelling in the joints  These usually happen 1 to 4 weeks after an infection  In most cases, the symptoms affect only a few joints, usually in the knees, ankles, or feet  Other symptoms might include:  ?Pain in the tendons in the feet and ankles (tendons are tough bands of tissue that connect muscles to bones)  ? Irritation of the eye called "conjunctivitis" (also known as pink eye)  ? Pain when urinating  Is there a test for reactive arthritis? -- No  There is no test  But if your doctor or nurse can figure out what type of germ caused your infection, he or she should be able to tell if you have reactive arthritis  Your doctor or nurse can test your stool (bowel movements) or urine to look for certain kinds of germs  If your doctor or nurse can't tell what germs caused your infection, he or she will study your symptoms to decide how likely it is that you have reactive arthritis  How is reactive arthritis treated? -- The symptoms of reactive arthritis are treated with medicines, including:  ? NSAIDs - This is a large group of medicines that includes ibuprofen (sample brand names: Advil, Motrin) and naproxen (sample brand name: Aleve)  Your doctor or nurse might prescribe a dose higher than you would normally take to relieve pain  ? Other medicines - If NSAIDs do not help your symptoms, your doctor or nurse might give you a shot of steroids  Steroids help reduce inflammation  These are not the same as the steroids some athletes take illegally  Your doctor might also give you more steroids to take at home  There are also other medicines that might help if your symptoms do not get better with NSAIDs or steroids  ? Eye drops - Special drops can help relieve redness and irritation in your eyes  But if you have eye pain or trouble seeing, visit an eye doctor to make sure you don't have a more serious problem  Antibiotics do not usually help with the joint symptoms of reactive arthritis  Even so, your doctor or nurse might prescribe them if you still have an infection  When will I feel better? -- Most people with reactive arthritis get better quickly  Some people continue to notice symptoms, either constantly or just once in a while  If your back gets very stiff and sore, see your doctor or nurse   This might mean that your reactive arthritis has turned into a more serious problem

## 2019-10-21 NOTE — PROGRESS NOTES
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, which has been under control s/p intra-articular steroid injection at her last clinic visit  Highest on differential for patient her age to have an inflammatory monoarthritis is reactive arthritis  Patient was asked to restart sulfasalazine 500mg po bid for more long-term management of her inflammatory arthritis; may be able to discontinue it in 6 months if she remains symptom-free  She can continue taking naproxen 500mg po bid as needed, which patient has not been requiring much  Ordered ESR/CRP to see what patient's current baseline is while she is asymptomatic  She can return to work without restrictions at her job with [employer] services  Prescribed amoxicillin 500mg po q8 hours for 7 days to treat patient's group B strep UTI  Plan:  Diagnoses and all orders for this visit:    Inflammatory arthritis  -     Sedimentation rate, automated  -     C-reactive protein  -     naproxen (NAPROSYN) 500 mg tablet; Take 1 tablet (500 mg total) by mouth 2 (two) times a day with meals  -     sulfaSALAzine (AZULFIDINE) 500 mg tablet; Take 1 tablet (500 mg total) by mouth 2 (two) times a day    Chronic right hip pain    Urinary tract infection without hematuria, site unspecified  -     amoxicillin (AMOXIL) 500 mg capsule; Take 1 capsule (500 mg total) by mouth every 8 (eight) hours for 7 days For Group B strep UTI    Follow-up plan: Return to clinic in 6 months, or earlier as needed      Rheumatic Disease Summary  Myrtis Lundborg is a 22 y o  female who originally presented on 8/26/19 as a Rheumatology consult referred by Orthopedics provider Dr Blair Morris for evaluation of inflammatory arthritis found on recent left knee MRI    Patient was determined to have an inflammatory monoarthritis based on physical exam and MRI, possibly secondary to reactive arthritis of unknown etiology; she may have been exposed to an infection while working in [employer] in [place] back in 2016  Administered a Kenalog steroid injection into her left knee at her initial visit to acutely help with her joint pain and inflammation  Also started patient on DMARD therapy with sulfasalazine 500 mg p o  B i d  for long-term control of her inflammatory arthritis; this is a relatively safe medication that does not require drug toxicity monitoring and is safe in pregnancy if patient decides to get pregnant  While giving sulfasalazine time to take effect, had also prescribed for patient naproxen 500 mg p  O  B i d  with meals to take as needed  Have ordered a rheumatoid factor and anti-CCP to evaluate for underlying rheumatoid arthritis      Chief Complaint  Chief Complaint   Patient presents with    Follow-up     HPI  Starlyn Snellen is a 22 y o   female who presents for follow-up of 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  After receiving a steroid injection into her left knee at her initial clinic visit last visit 8/26/19, the knee started feeling better the next day  She was taking naproxen 500mg po bid for 2-3 weeks, then as needed; was also taking sulfasalazine 500mg po bid for 2 weeks, but then stopped taking it because did not realize that she had to continue taking it as long-term therapy  Patient has no pain or swelling in any of her joints today  She intermittently gets pain in her right hip, which she had surgery on in the past  She also wants to be treated for a UTI she was found to have a few months ago; has been unable to get in to see her Ob/Gyn      The following portions of the patient's history were reviewed and updated as appropriate: allergies, current medications, past family history, past medical history, past social history, past surgical history, and problem list     Review of Systems:   Review of Systems   Constitutional: Negative for chills, fatigue, fever and unexpected weight change  HENT: Negative for mouth sores and trouble swallowing  Eyes: Negative for pain and visual disturbance  Respiratory: Negative for cough and shortness of breath  Cardiovascular: Negative for chest pain and leg swelling  Gastrointestinal: Negative for abdominal pain, blood in stool, constipation, diarrhea and nausea  Genitourinary: Positive for frequency  Negative for hematuria  Musculoskeletal: Positive for arthralgias and joint swelling  Negative for back pain and myalgias  Skin: Negative for rash  Neurological: Negative for weakness and numbness  Hematological: Negative for adenopathy  Psychiatric/Behavioral: Negative for sleep disturbance  Home Medications:    Current Outpatient Medications:     naproxen (NAPROSYN) 500 mg tablet, Take 1 tablet (500 mg total) by mouth 2 (two) times a day with meals, Disp: 60 tablet, Rfl: 6    sulfaSALAzine (AZULFIDINE) 500 mg tablet, Take 1 tablet (500 mg total) by mouth 2 (two) times a day, Disp: 60 tablet, Rfl: 6    amoxicillin (AMOXIL) 500 mg capsule, Take 1 capsule (500 mg total) by mouth every 8 (eight) hours for 7 days For Group B strep UTI, Disp: 21 capsule, Rfl: 0    Objective:    Vitals:    10/21/19 1121   BP: 112/78   BP Location: Right arm   Patient Position: Sitting   Cuff Size: Standard   Weight: 61 2 kg (135 lb)   Height: 5' 5" (1 651 m)       Physical Exam   Constitutional: She appears well-developed and well-nourished  She is cooperative  No distress  HENT:   Head: Normocephalic and atraumatic  Mouth/Throat: Oropharynx is clear and moist and mucous membranes are normal    Eyes: Conjunctivae and EOM are normal  No scleral icterus  Neck: Neck supple  No spinous process tenderness and no muscular tenderness present  No thyromegaly present  Cardiovascular: Normal rate, regular rhythm, S1 normal and S2 normal  Exam reveals no friction rub  No murmur heard    Pulmonary/Chest: Breath sounds normal  No respiratory distress  She has no wheezes  She has no rhonchi  She has no rales  Musculoskeletal: She exhibits no tenderness  Lymphadenopathy:     She has no cervical adenopathy  Neurological: She is alert  No sensory deficit  Skin: Skin is warm and dry  Rash noted  Nails show no clubbing  Acne on bilateral cheeks   Psychiatric: She has a normal mood and affect  Reviewed labs and imaging  Imaging:   Left Knee MRI w/o contrast 7/22/19  IMPRESSION:  Given the recent aseptic joint aspiration and prior similar MRI findings in 2016, the current findings are most consistent with active inflammatory arthritis with articular synovitis and two areas of marginal osteitis (pre-erosive disease) at the   posterior weightbearing aspect of the medial femoral condyle and posterior aspect of the lateral tibial plateau  Labs:   Appointment on 09/12/2019   Component Date Value Ref Range Status    17-OH PROGESTERONE 09/12/2019 99  ng/dL Final                              Adult Female                             Follicular        15 -  70                             Luteal            35 - 290  This test was developed and its performance characteristics  determined by [facility]  It has not been cleared or approved  by the Food and Drug Administration   DHEA-SO4 09/12/2019 150 1  110 0 - 431 7 ug/dL Final    Estradiol 09/12/2019 91 0    pg/mL Final      ESTRADIOL:    Mentruating Females: Follicular phase:  90 2-110 1  pg/mL      Mid-cycle phase:   49 9- 367 2 pg/mL      Luteal phase:      40 2-259    pg/mL     Postmenopausal females (untreated):  <11-58 3  pg/mL  On Menopausal Hormone Therapy (MHT): < 1 pg/mL    Women taking the drug Fulvestrant (Faslodex) may have falsely elevated Estradiol results  Suggest ordering [facility] Estradiol, LC/MS -test number [id number], to monitor these patients      Note: Normal ranges may not apply to patients who are transgender, non-binary, or whose legal sex, sex at birth, and gender identity differ   [facility] 09/12/2019 9 5    mIU/mL Final    Cholesterol 09/12/2019 158  50 - 200 mg/dL Final      Cholesterol:       Desirable         <200 mg/dl       Borderline         200-239 mg/dl       High              >239           Triglycerides 09/12/2019 49  <=150 mg/dL Final      Triglyceride:     Normal          <150 mg/dl     Borderline High 150-199 mg/dl     High            200-499 mg/dl        Very High       >499 mg/dl    Specimen collection should occur prior to N-Acetylcysteine or Metamizole administration due to the potential for falsely depressed results   HDL, Direct 09/12/2019 61* 40 - 60 mg/dL Final      HDL Cholesterol:       High    >60 mg/dL       Low     <41 mg/dL  Specimen collection should occur prior to Metamizole administration due to the potential for falsley depressed results   LDL Calculated 09/12/2019 87  0 - 100 mg/dL Final      LDL Cholesterol:     Optimal           <100 mg/dl     Near Optimal      100-129 mg/dl     Above Optimal       Borderline High 130-159 mg/dl       High            160-189 mg/dl       Very High       >189 mg/dl         This screening LDL is a calculated result  It does not have the accuracy of the Direct Measured LDL in the monitoring of patients with hyperlipidemia and/or statin therapy  Direct Measure LDL ([id number]) must be ordered separately in these patients   Non-HDL-Chol (CHOL-HDL) 09/12/2019 97  mg/dl Final    LH 09/12/2019 29 5    mIU/mL Final      LH:   Menstruating Females:     Follicular Phase  2 6-85 0  mIU/mL     Mid-Cycle Phase   22 8-76 1 mIU/mL     Luteal Phase      0 6-13 5  mIU/mL   Postmenopausal Females:     On Menopausal Hormone Therapy(MHT) 1 1-52 4 mIU/mL     Untreated                          8 6-61 8 mIU/mL    Note: Normal ranges may not apply to patients who are transgender, non-binary, or whose legal sex, sex at birth, and gender identity differ      Prolactin 09/12/2019 12 8    ng/mL Final PROLACTIN:     Females:    Non-pregnant    2 2-30  3  ng/mL    Pregnant        8 1-347 6 ng/mL    Post-menopausal 0 7-31 5  ng/mL     Note: Normal ranges may not apply to patients who are transgender, non-binary, or whose legal sex, sex at birth, and gender identity differ   Sex Hormone Binding 09/12/2019 72 8  24 6 - 122 0 nmol/L Final    Testosterone, Free 09/12/2019 2 3  0 0 - 4 2 pg/mL Final    TESTOSTERONE TOTAL 09/12/2019 47  8 - 48 ng/dL Final    TSH 3RD GENERATON 09/12/2019 0 445  0 358 - 3 740 uIU/mL Final      The recommended reference ranges for TSH during pregnancy are as follows:   First trimester 0 1 to 2 5 uIU/mL   Second trimester  0 2 to 3 0 uIU/mL   Third trimester 0 3 to 3 0 uIU/m    Note: Normal ranges may not apply to patients who are transgender, non-binary, or whose legal sex, sex at birth, and gender identity differ  Using supplements with high doses of biotin 20 to more than 300 times greater than the adequate daily intake for adults of 30 mcg/day as established by the [place] of [employer], can cause falsely depress results  Office Visit on 08/26/2019   Component Date Value Ref Range Status    CRP 09/12/2019 22 4* <3 0 mg/L Final    Sed Rate 09/12/2019 27* 0 - 20 mm/hour Final    Vit D, 25-Hydroxy 09/12/2019 26 9* 30 0 - 100 0 ng/mL Final    Rheumatoid Factor 09/12/2019 Negative  Negative Final    Cyclic Citrullin Peptide Ab 09/12/2019 4  0 - 19 units Final                              Negative               <20                            Weak positive      20 - 39                            Moderate positive  40 - 59                            Strong positive        >59   Office Visit on 07/16/2019   Component Date Value Ref Range Status    Urine Culture 07/16/2019 2704-2668 cfu/ml Beta Hemolytic Streptococcus Group B*  Final    This organism is intrinisically susceptible to Penicillin    If sensitivites to other antibiotics are required, please call the Microbiology Department at [phone] within 5 days   Urine Culture 07/16/2019 70,000-79,000 cfu/ml    Final    Mixed Contaminants X5    LEUKOCYTE ESTERASE,UA 07/16/2019 Negative   Final    NITRITE,UA 07/16/2019 Negative   Final    SL AMB POCT UROBILINOGEN 07/16/2019 0 2   Final    POCT URINE PROTEIN 07/16/2019 Negative   Final     PH,UA 07/16/2019 7 5   Final    BLOOD,UA 07/16/2019 Negative   Final    SPECIFIC GRAVITY,UA 07/16/2019 1 010   Final    KETONES,UA 07/16/2019 Negative   Final    BILIRUBIN,UA 07/16/2019 Negative   Final    GLUCOSE, UA 07/16/2019 Negative   Final     COLOR,UA 07/16/2019 Yellow   Final    CLARITY,UA 07/16/2019 Cloudy   Final   Lab Requisition on 07/10/2019   Component Date Value Ref Range Status    RBC, SYNOVIAL 07/10/2019 11,000* 0 - 10 Final    WBC, Fluid 07/10/2019 16,176  /ul Final    Body Fluid Culture, Sterile 07/10/2019 No growth   Final    Gram Stain Result 07/10/2019 2+ Polys   Final    Gram Stain Result 07/10/2019 No bacteria seen   Final    Anaerobic Culture 07/10/2019 No growth   Final    Crystals, Synovial Fluid 07/10/2019 No Crystals Seen  No Crystals Seen Final    Total Counted 07/10/2019 100   Final    Neutrophil % Synovial 07/10/2019 73  % Final    Lymph % Synovial 07/10/2019 12  % Final    Monocyte % Synovial 07/10/2019 15  % Final   Admission on 06/10/2019, Discharged on 06/10/2019   Component Date Value Ref Range Status    EXT PREG TEST UR (Ref: Negative) 06/10/2019 negative   Final   Appointment on 12/13/2018   Component Date Value Ref Range Status    Varicella IgG 12/13/2018 NON-IMMUNE* IMMUNE Final    Rubeola IgG 12/13/2018 IMMUNE  IMMUNE Final    Rubella IgG Quant 12/13/2018 91 5  >9 9 IU/mL Final    Mumps IgG 12/13/2018 IMMUNE  IMMUNE Final    IMMUNE - Presumed immune to mumps infection      QFT Nil 12/13/2018 0 01  0 - 8 0 IU/ml Final    QFT TB1-NIL 12/13/2018 0 00  IU/ml Final    QFT TB2-NIL 12/13/2018 0 00  IU/ml Final    QFT Mitogen-NIL 12/13/2018 >10 00  IU/ml Final    QFT Final Interpretation 12/13/2018 Negative  Negative Final    No Interferon-gamma response to M  tuberculosis antigens detected  Infection with M  tuberculosis is unlikely  A single negative result does not exclude infection with M  tuberculosis  In patients at high risk for M  tuberculosis infection, a second test should be considered in accordance with the 2017 ATS/IDSA/CDC Clinical Practice Guidelines for Diagnosis of Tuberculosis in Adults and Children  False negative results can be a result of incorrect blood sample collection or handling of the specimen affecting lymphocyte function

## 2019-10-21 NOTE — LETTER
October 21, 2019     Patient: Myrtis Lundborg   YOB: 1995   Date of Visit: 10/21/2019       To Whom it May Concern:    Leandrew Schilder is under my professional care  She was seen in my office on 10/21/2019  She may return to full work with no restrictions  If you have any questions or concerns, please don't hesitate to call           Sincerely,          Marisa Burdick MD        CC: Myrtis Lundborg

## 2019-10-26 ENCOUNTER — TELEPHONE (OUTPATIENT)
Dept: RHEUMATOLOGY | Facility: CLINIC | Age: 24
End: 2019-10-26

## 2019-10-26 NOTE — TELEPHONE ENCOUNTER
Please remind patient to complete the bloodwork I ordered for her last week  Also, double check that she picked up her amoxicillin prescription from the pharmacy to treat her UTI   Thanks, SHERRY

## 2020-04-17 DIAGNOSIS — M19.90 INFLAMMATORY ARTHRITIS: ICD-10-CM

## 2020-04-17 RX ORDER — SULFASALAZINE 500 MG/1
500 TABLET ORAL 2 TIMES DAILY
Qty: 60 TABLET | Refills: 0 | Status: SHIPPED | OUTPATIENT
Start: 2020-04-17 | End: 2020-04-27 | Stop reason: SDUPTHER

## 2020-04-17 RX ORDER — SULFASALAZINE 500 MG/1
500 TABLET ORAL 2 TIMES DAILY
Qty: 60 TABLET | Refills: 0 | OUTPATIENT
Start: 2020-04-17

## 2020-04-17 RX ORDER — NAPROXEN 500 MG/1
500 TABLET ORAL 2 TIMES DAILY WITH MEALS
Qty: 60 TABLET | Refills: 0 | OUTPATIENT
Start: 2020-04-17

## 2020-04-17 RX ORDER — NAPROXEN 500 MG/1
500 TABLET ORAL 2 TIMES DAILY WITH MEALS
Qty: 60 TABLET | Refills: 0 | Status: SHIPPED | OUTPATIENT
Start: 2020-04-17 | End: 2020-04-27 | Stop reason: SDUPTHER

## 2020-04-27 DIAGNOSIS — M19.90 INFLAMMATORY ARTHRITIS: ICD-10-CM

## 2020-04-27 RX ORDER — SULFASALAZINE 500 MG/1
500 TABLET ORAL 2 TIMES DAILY
Qty: 60 TABLET | Refills: 3 | Status: SHIPPED | OUTPATIENT
Start: 2020-04-27 | End: 2020-06-30 | Stop reason: SDUPTHER

## 2020-04-27 RX ORDER — NAPROXEN 500 MG/1
500 TABLET ORAL 2 TIMES DAILY WITH MEALS
Qty: 60 TABLET | Refills: 3 | Status: SHIPPED | OUTPATIENT
Start: 2020-04-27 | End: 2020-06-26

## 2020-06-26 ENCOUNTER — INITIAL PRENATAL (OUTPATIENT)
Dept: OBGYN CLINIC | Facility: CLINIC | Age: 25
End: 2020-06-26

## 2020-06-26 ENCOUNTER — TELEPHONE (OUTPATIENT)
Dept: OBGYN CLINIC | Facility: CLINIC | Age: 25
End: 2020-06-26

## 2020-06-26 VITALS
SYSTOLIC BLOOD PRESSURE: 110 MMHG | BODY MASS INDEX: 21.13 KG/M2 | TEMPERATURE: 98.8 F | HEIGHT: 65 IN | WEIGHT: 126.8 LBS | RESPIRATION RATE: 16 BRPM | DIASTOLIC BLOOD PRESSURE: 70 MMHG | HEART RATE: 92 BPM

## 2020-06-26 DIAGNOSIS — Z34.01 ENCOUNTER FOR SUPERVISION OF NORMAL FIRST PREGNANCY IN FIRST TRIMESTER: Primary | ICD-10-CM

## 2020-06-26 DIAGNOSIS — Z87.898 HISTORY OF RECREATIONAL DRUG USE: ICD-10-CM

## 2020-06-26 DIAGNOSIS — Z3A.08 8 WEEKS GESTATION OF PREGNANCY: ICD-10-CM

## 2020-06-26 DIAGNOSIS — Z83.3 FAMILY HISTORY OF DIABETES IN PREGNANCY: ICD-10-CM

## 2020-06-26 PROCEDURE — OBC: Performed by: OBSTETRICS & GYNECOLOGY

## 2020-06-30 ENCOUNTER — OFFICE VISIT (OUTPATIENT)
Dept: RHEUMATOLOGY | Facility: CLINIC | Age: 25
End: 2020-06-30
Payer: COMMERCIAL

## 2020-06-30 ENCOUNTER — APPOINTMENT (OUTPATIENT)
Dept: LAB | Facility: MEDICAL CENTER | Age: 25
End: 2020-06-30
Payer: COMMERCIAL

## 2020-06-30 VITALS
WEIGHT: 126 LBS | SYSTOLIC BLOOD PRESSURE: 104 MMHG | BODY MASS INDEX: 20.99 KG/M2 | TEMPERATURE: 98.5 F | HEIGHT: 65 IN | DIASTOLIC BLOOD PRESSURE: 72 MMHG

## 2020-06-30 DIAGNOSIS — M02.30 REACTIVE ARTHRITIS (HCC): Primary | ICD-10-CM

## 2020-06-30 DIAGNOSIS — Z3A.08 8 WEEKS GESTATION OF PREGNANCY: ICD-10-CM

## 2020-06-30 DIAGNOSIS — M25.562 LEFT KNEE PAIN, UNSPECIFIED CHRONICITY: ICD-10-CM

## 2020-06-30 DIAGNOSIS — Z87.898 HISTORY OF RECREATIONAL DRUG USE: ICD-10-CM

## 2020-06-30 DIAGNOSIS — Z34.01 ENCOUNTER FOR SUPERVISION OF NORMAL FIRST PREGNANCY IN FIRST TRIMESTER: ICD-10-CM

## 2020-06-30 DIAGNOSIS — M19.90 INFLAMMATORY ARTHRITIS: ICD-10-CM

## 2020-06-30 DIAGNOSIS — Z83.3 FAMILY HISTORY OF DIABETES IN PREGNANCY: ICD-10-CM

## 2020-06-30 LAB
ABO GROUP BLD: NORMAL
BACTERIA UR QL AUTO: ABNORMAL /HPF
BASOPHILS # BLD AUTO: 0.05 THOUSANDS/ΜL (ref 0–0.1)
BASOPHILS NFR BLD AUTO: 1 % (ref 0–1)
BILIRUB UR QL STRIP: NEGATIVE
BLD GP AB SCN SERPL QL: NEGATIVE
CLARITY UR: ABNORMAL
COLOR UR: YELLOW
EOSINOPHIL # BLD AUTO: 0.06 THOUSAND/ΜL (ref 0–0.61)
EOSINOPHIL NFR BLD AUTO: 1 % (ref 0–6)
ERYTHROCYTE [DISTWIDTH] IN BLOOD BY AUTOMATED COUNT: 14.1 % (ref 11.6–15.1)
EST. AVERAGE GLUCOSE BLD GHB EST-MCNC: 103 MG/DL
GLUCOSE P FAST SERPL-MCNC: 74 MG/DL (ref 65–99)
GLUCOSE UR STRIP-MCNC: NEGATIVE MG/DL
HBA1C MFR BLD: 5.2 %
HBV SURFACE AG SER QL: NORMAL
HCT VFR BLD AUTO: 39.9 % (ref 34.8–46.1)
HCV AB SER QL: NORMAL
HGB BLD-MCNC: 12.4 G/DL (ref 11.5–15.4)
HGB UR QL STRIP.AUTO: NEGATIVE
IMM GRANULOCYTES # BLD AUTO: 0.03 THOUSAND/UL (ref 0–0.2)
IMM GRANULOCYTES NFR BLD AUTO: 0 % (ref 0–2)
KETONES UR STRIP-MCNC: ABNORMAL MG/DL
LEUKOCYTE ESTERASE UR QL STRIP: ABNORMAL
LYMPHOCYTES # BLD AUTO: 1.76 THOUSANDS/ΜL (ref 0.6–4.47)
LYMPHOCYTES NFR BLD AUTO: 17 % (ref 14–44)
MCH RBC QN AUTO: 28.4 PG (ref 26.8–34.3)
MCHC RBC AUTO-ENTMCNC: 31.1 G/DL (ref 31.4–37.4)
MCV RBC AUTO: 92 FL (ref 82–98)
MONOCYTES # BLD AUTO: 0.84 THOUSAND/ΜL (ref 0.17–1.22)
MONOCYTES NFR BLD AUTO: 8 % (ref 4–12)
MUCOUS THREADS UR QL AUTO: ABNORMAL
NEUTROPHILS # BLD AUTO: 7.92 THOUSANDS/ΜL (ref 1.85–7.62)
NEUTS SEG NFR BLD AUTO: 73 % (ref 43–75)
NITRITE UR QL STRIP: NEGATIVE
NON-SQ EPI CELLS URNS QL MICRO: ABNORMAL /HPF
NRBC BLD AUTO-RTO: 0 /100 WBCS
OTHER STN SPEC: ABNORMAL
PH UR STRIP.AUTO: 7 [PH]
PLATELET # BLD AUTO: 336 THOUSANDS/UL (ref 149–390)
PMV BLD AUTO: 10 FL (ref 8.9–12.7)
PROT UR STRIP-MCNC: ABNORMAL MG/DL
RBC # BLD AUTO: 4.36 MILLION/UL (ref 3.81–5.12)
RBC #/AREA URNS AUTO: ABNORMAL /HPF
RH BLD: POSITIVE
RUBV IGG SERPL IA-ACNC: 144.6 IU/ML
SP GR UR STRIP.AUTO: 1.02 (ref 1–1.03)
SPECIMEN EXPIRATION DATE: NORMAL
UROBILINOGEN UR QL STRIP.AUTO: 1 E.U./DL
WBC # BLD AUTO: 10.66 THOUSAND/UL (ref 4.31–10.16)
WBC #/AREA URNS AUTO: ABNORMAL /HPF

## 2020-06-30 PROCEDURE — 99214 OFFICE O/P EST MOD 30 MIN: CPT | Performed by: INTERNAL MEDICINE

## 2020-06-30 PROCEDURE — 86803 HEPATITIS C AB TEST: CPT

## 2020-06-30 PROCEDURE — 80081 OBSTETRIC PANEL INC HIV TSTG: CPT

## 2020-06-30 PROCEDURE — 86787 VARICELLA-ZOSTER ANTIBODY: CPT

## 2020-06-30 PROCEDURE — 80307 DRUG TEST PRSMV CHEM ANLYZR: CPT

## 2020-06-30 PROCEDURE — 87086 URINE CULTURE/COLONY COUNT: CPT

## 2020-06-30 PROCEDURE — 82947 ASSAY GLUCOSE BLOOD QUANT: CPT

## 2020-06-30 PROCEDURE — 36415 COLL VENOUS BLD VENIPUNCTURE: CPT

## 2020-06-30 PROCEDURE — 81001 URINALYSIS AUTO W/SCOPE: CPT

## 2020-06-30 PROCEDURE — 83036 HEMOGLOBIN GLYCOSYLATED A1C: CPT

## 2020-06-30 RX ORDER — SULFASALAZINE 500 MG/1
500 TABLET ORAL 2 TIMES DAILY
Qty: 60 TABLET | Refills: 3 | Status: SHIPPED | OUTPATIENT
Start: 2020-06-30 | End: 2020-10-06 | Stop reason: SDUPTHER

## 2020-07-01 LAB
AMPHETAMINES UR QL SCN: NEGATIVE NG/ML
BARBITURATES UR QL SCN: NEGATIVE NG/ML
BENZODIAZ UR QL: NEGATIVE NG/ML
BZE UR QL: NEGATIVE NG/ML
CANNABINOIDS UR QL SCN: NEGATIVE NG/ML
HIV 1+2 AB+HIV1 P24 AG SERPL QL IA: NORMAL
METHADONE UR QL SCN: NEGATIVE NG/ML
OPIATES UR QL: NEGATIVE NG/ML
PCP UR QL: NEGATIVE NG/ML
PROPOXYPH UR QL SCN: NEGATIVE NG/ML
RPR SER QL: NORMAL

## 2020-07-02 LAB
BACTERIA UR CULT: NORMAL
VZV IGG SER IA-ACNC: NORMAL

## 2020-07-06 ENCOUNTER — HOSPITAL ENCOUNTER (OUTPATIENT)
Dept: RADIOLOGY | Facility: HOSPITAL | Age: 25
Discharge: HOME | End: 2020-07-06
Attending: OBSTETRICS & GYNECOLOGY
Payer: COMMERCIAL

## 2020-07-06 DIAGNOSIS — Z34.01 ENCOUNTER FOR SUPERVISION OF NORMAL FIRST PREGNANCY IN FIRST TRIMESTER: ICD-10-CM

## 2020-07-06 DIAGNOSIS — Z3A.08 8 WEEKS GESTATION OF PREGNANCY: ICD-10-CM

## 2020-07-06 PROCEDURE — 76801 OB US < 14 WKS SINGLE FETUS: CPT

## 2020-07-09 ENCOUNTER — DOCUMENTATION (OUTPATIENT)
Dept: OBGYN CLINIC | Facility: CLINIC | Age: 25
End: 2020-07-09

## 2020-07-09 NOTE — RESULT ENCOUNTER NOTE
Single live IUP  9 3 wga  FHT 165bpm  Final edc by lmp - 2/1/21  [facility]

## 2020-07-09 NOTE — PROGRESS NOTES
Single live IUP  9 3 wga  FHT 165bpm  Final edc by lmp - 2/1/21  [facility]

## 2020-07-26 ENCOUNTER — TELEPHONE (OUTPATIENT)
Dept: OTHER | Facility: OTHER | Age: 25
End: 2020-07-26

## 2020-07-29 ENCOUNTER — ROUTINE PRENATAL (OUTPATIENT)
Dept: OBGYN CLINIC | Facility: CLINIC | Age: 25
End: 2020-07-29

## 2020-07-29 VITALS
SYSTOLIC BLOOD PRESSURE: 112 MMHG | TEMPERATURE: 99.3 F | WEIGHT: 124.2 LBS | DIASTOLIC BLOOD PRESSURE: 62 MMHG | BODY MASS INDEX: 20.67 KG/M2

## 2020-07-29 DIAGNOSIS — Z12.4 SCREENING FOR MALIGNANT NEOPLASM OF THE CERVIX: ICD-10-CM

## 2020-07-29 DIAGNOSIS — Z11.3 SCREENING FOR STD (SEXUALLY TRANSMITTED DISEASE): ICD-10-CM

## 2020-07-29 DIAGNOSIS — Z3A.13 13 WEEKS GESTATION OF PREGNANCY: ICD-10-CM

## 2020-07-29 DIAGNOSIS — Z34.01 ENCOUNTER FOR SUPERVISION OF NORMAL FIRST PREGNANCY IN FIRST TRIMESTER: Primary | ICD-10-CM

## 2020-07-29 PROCEDURE — 87491 CHLMYD TRACH DNA AMP PROBE: CPT | Performed by: OBSTETRICS & GYNECOLOGY

## 2020-07-29 PROCEDURE — 87591 N.GONORRHOEAE DNA AMP PROB: CPT | Performed by: OBSTETRICS & GYNECOLOGY

## 2020-07-29 PROCEDURE — PNV: Performed by: OBSTETRICS & GYNECOLOGY

## 2020-07-29 PROCEDURE — G0145 SCR C/V CYTO,THINLAYER,RESCR: HCPCS | Performed by: OBSTETRICS & GYNECOLOGY

## 2020-07-29 NOTE — PROGRESS NOTES
26 y o    female at 15 2 wga EGA for PNV  BP : 112/62  TWG: -4lb    Patient presents for prenatal history and physical  She is doing well and has minimal complaints  She denies vaginal bleeding  She has had mild cramping  Obstetric history noncontributory  Dating Ultrasound/initial pn labs reviewed with patient  Patient is not interested in sequential screening  Referral provided for Level II US at [facility]  Patient is planning to breast feed  Pap collected  GC/Chlamydia collected  Patient oriented to practice, different practice providers, different practice locations  Reviewed prenatal visit schedule  Discussed recommendation to limit total weight gain in pregnancy to 25-35 lb  Encouraged continued exercise in pregnancy    Follow-up in 4 weeks

## 2020-07-29 NOTE — PROGRESS NOTES
Patient reports nausea that comes off/on  Over all she feels good  She thinks she is feeling fetal movement

## 2020-07-30 LAB
C TRACH DNA SPEC QL NAA+PROBE: NEGATIVE
N GONORRHOEA DNA SPEC QL NAA+PROBE: NEGATIVE

## 2020-08-04 LAB
LAB AP GYN PRIMARY INTERPRETATION: NORMAL
Lab: NORMAL

## 2020-08-06 ENCOUNTER — TELEPHONE (OUTPATIENT)
Dept: OBGYN CLINIC | Facility: CLINIC | Age: 25
End: 2020-08-06

## 2020-08-06 NOTE — LETTER
08/06/20    Lelia Green  1995    To Whom It May Concern:      Lelia Green is seen in our office for her prenatal care  Her Estimated Date of Delivery: 2/1/21  Please contact the office with any questions        Sincerely,        KatieBannerlisa Hoang, MD Bia Garner MD

## 2020-08-06 NOTE — LETTER
08/06/20      nAuj Oconnell  1995    To Whom It May Concern,      Your employee is a patient at [facility] [address]  Her Estimated Date of Delivery: 2/1/21    We recommend that all pregnant women:    Have a well-ventilated work space  Wear low-heeled shoes  Work no more than 40 hours per week  Have a 15 minute break every 2 hours and at least 30 minutes for a meal break  Use good body mechanics by bending at the knees to avoid back strain and lift no more than 20 pounds without assistance  Have ready access to bathrooms and water  She may continue to work until her due date unless medical complications arise  We anticipate she may return to work in 6-8 weeks after delivery  Please contact our office with any questions           Cathy Wolff MD, DO Bella Broussard MD

## 2020-08-06 NOTE — TELEPHONE ENCOUNTER
14w3d OB calling for OB work note and proof of pregnancy  Advised they are both in [facility] [address] she can print from computer or laptop  Verbalized understanding

## 2020-08-17 ENCOUNTER — TELEPHONE (OUTPATIENT)
Dept: RHEUMATOLOGY | Facility: CLINIC | Age: 25
End: 2020-08-17

## 2020-08-17 NOTE — TELEPHONE ENCOUNTER
I attempted to reach out to the patient to schedule a follow up for the injection  Received vmail  Asked her to [facility] to schedule a f/u for 8/18, we have 3 spots that opened    (I did make note I cannot guarantee one of those spots that hopefully she'll get the message soon and be able to call back)

## 2020-08-17 NOTE — TELEPHONE ENCOUNTER
----- Message from Jeana Jenkins MD sent at 8/16/2020  9:22 PM EDT -----  Regarding: FW: Non-Urgent Medical Question  Contact: [phone]  Please contact patient for any last minute follow-up slot opening for this Tuesday or Thursday  ----- Message -----  From: Nia Browning  Sent: 8/16/2020   7:40 PM EDT  To: Rheumatology [facility]  Subject: Non-Urgent Medical Question                      Griseldalo Doctor Ghotra, I just seen your message, is there any time slots for this Tuesday or Thursday? I'm off those days and I am available Monday and Friday in the morning to come in for the injection

## 2020-08-18 ENCOUNTER — OFFICE VISIT (OUTPATIENT)
Dept: RHEUMATOLOGY | Facility: CLINIC | Age: 25
End: 2020-08-18
Payer: COMMERCIAL

## 2020-08-18 VITALS
HEIGHT: 65 IN | BODY MASS INDEX: 20.66 KG/M2 | WEIGHT: 124 LBS | DIASTOLIC BLOOD PRESSURE: 70 MMHG | TEMPERATURE: 98.8 F | SYSTOLIC BLOOD PRESSURE: 120 MMHG

## 2020-08-18 DIAGNOSIS — M02.30 REACTIVE ARTHRITIS (HCC): Primary | ICD-10-CM

## 2020-08-18 DIAGNOSIS — M25.562 LEFT KNEE PAIN, UNSPECIFIED CHRONICITY: ICD-10-CM

## 2020-08-18 PROCEDURE — 99214 OFFICE O/P EST MOD 30 MIN: CPT | Performed by: INTERNAL MEDICINE

## 2020-08-18 PROCEDURE — 20610 DRAIN/INJ JOINT/BURSA W/O US: CPT | Performed by: INTERNAL MEDICINE

## 2020-08-18 RX ORDER — TRIAMCINOLONE ACETONIDE 40 MG/ML
40 INJECTION, SUSPENSION INTRA-ARTICULAR; INTRAMUSCULAR ONCE
Status: COMPLETED | OUTPATIENT
Start: 2020-08-18 | End: 2020-08-18

## 2020-08-18 RX ORDER — LIDOCAINE HYDROCHLORIDE 10 MG/ML
1 INJECTION, SOLUTION INFILTRATION; PERINEURAL ONCE
Status: COMPLETED | OUTPATIENT
Start: 2020-08-18 | End: 2020-08-18

## 2020-08-18 RX ADMIN — LIDOCAINE HYDROCHLORIDE 1 ML: 10 INJECTION, SOLUTION INFILTRATION; PERINEURAL at 16:36

## 2020-08-18 RX ADMIN — TRIAMCINOLONE ACETONIDE 40 MG: 40 INJECTION, SUSPENSION INTRA-ARTICULAR; INTRAMUSCULAR at 16:37

## 2020-08-18 NOTE — PROGRESS NOTES
Assessment and Plan: Marti Jacome is a 26 y o   female who presents for follow-up of reactive arthritis, with her left knee being especially active lately  Successfully administered left knee steroid injection in clinic today  Patient is to continue her bid sulfasalazine and diclofenac gel as needed for joint pain  Plan:  Diagnoses and all orders for this visit:    Reactive arthritis ([address] )    Left knee pain, unspecified chronicity  -     triamcinolone acetonide (KENALOG-40) 40 mg/mL injection 40 mg  -     lidocaine (XYLOCAINE) 1 % injection 1 mL  Large joint arthrocentesis: L knee  Universal Protocol:  Consent: Verbal consent obtained  Written consent not obtained  Risks and benefits: risks, benefits and alternatives were discussed  Consent given by: patient  Time out: Immediately prior to procedure a "time out" was called to verify the correct patient, procedure, equipment, support staff and site/side marked as required  Timeout called at: 8/18/2020 3:00 PM   Patient understanding: patient states understanding of the procedure being performed  Patient consent: the patient's understanding of the procedure matches consent given  Procedure consent: procedure consent matches procedure scheduled  Relevant documents: relevant documents present and verified  Test results: test results available and properly labeled  Site marked: the operative site was marked  Radiology Images displayed and confirmed   If images not available, report reviewed: imaging studies available  Required items: required blood products, implants, devices, and special equipment available  Patient identity confirmed: verbally with patient    Supporting Documentation  Indications: pain and joint swelling   Procedure Details  Location: knee - L knee  Preparation: Patient was prepped and draped in the usual sterile fashion  Needle size: 25 G  Ultrasound guidance: no  Approach: anterolateral    Patient tolerance: patient tolerated the procedure well with no immediate complications  Dressing:  Sterile dressing applied    After discussing the risks/benefits of left knee steroid injection, including minor risk of infection, bleeding, pain, or ineffectiveness, informed consent was obtained and patient was agreeable to proceed  Using sterile technique, the left knee was prepped with Chloraprep, and was topically anesthetized with Ethyl Chloride  The joint was entered using an anterolateral approach while patient was sitting with legs hanging off exam table and Kenalog 40 mg and 1 mL lidocaine 1% were then injected and the needle withdrawn  The procedure was well tolerated  Patient was instructed to call our office with any concerns or questions  Follow-up plan: Return to clinic in 2 months      Rheumatic Disease Summary  Santana Lopez is a 26 y  o  female who originally presented on 8/26/19 as a Rheumatology consult referred by Orthopedics provider Dr Rebekah Timmons evaluation of inflammatory arthritis found on recent left knee MRI   Patient was determined to have an inflammatory monoarthritis based on physical exam and MRI, possibly secondary to reactive arthritis of unknown etiology; she may have been exposed to an infection while working in the [employer] in [place] back in 2016  Administered a Kenalog steroid injection into her left knee at her initial visit to acutely help with her joint pain and inflammation  Also started patient on DMARD therapy with sulfasalazine 500 mg p o  B i d  for long-term control of her inflammatory arthritis; this is a relatively safe medication that does not require drug toxicity monitoring and is safe in pregnancy if patient decides to get pregnant   While giving sulfasalazine time to take effect, had also prescribed for patient naproxen 500 mg p  O  B i d  with meals to take as needed  Have ordered a rheumatoid factor and anti-CCP to evaluate for underlying rheumatoid arthritis      She presented for follow-up on 10/21/19 for her 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, which had been under control s/p intra-articular steroid injection at her prior clinic visit  Highest on differential for patient her age to have an inflammatory monoarthritis is reactive arthritis  Patient was asked to restart sulfasalazine 500mg po bid for more long-term management of her inflammatory arthritis; may be able to discontinue it in 6 months if she remains symptom-free  She can continue taking naproxen 500mg po bid as needed, which patient had not been requiring much  Prescribed amoxicillin 500mg po q8 hours for 7 days to treat patient's group B strep UTI      Next clinic visit was 6/30/20  Her reactive arthritis seemed to be active again involving her left knee since stopping sulfasalazine in May due to finding out about being pregnant  Assured patient that it is safe to take sulfasalazine in pregnancy, and asked her to restart it at 500mg po bid  Also prescribed diclofenac gel as needed for her joint pain instead of oral NSAIDs due to pregnancy  HPI  Hannah Teixeira is a 26 y o   female who presents for follow-up of reactive arthritis  Last clinic visit was 6/30/20  He left knee has been particularly bothering her, and she requests a steroid injection, which has helped in the past     The following portions of the patient's history were reviewed and updated as appropriate: allergies, current medications, past family history, past medical history, past social history, past surgical history and problem list     Review of Systems:   Review of Systems   Constitutional: Negative for chills, fatigue, fever and unexpected weight change  HENT: Negative for mouth sores and trouble swallowing  Eyes: Negative for pain and visual disturbance  Respiratory: Negative for cough and shortness of breath  Cardiovascular: Negative for chest pain and leg swelling  Gastrointestinal: Negative for constipation and diarrhea     Musculoskeletal: Positive for arthralgias, back pain, joint swelling and myalgias  Skin: Negative for color change and rash  Neurological: Negative for weakness  Hematological: Negative for adenopathy  Psychiatric/Behavioral: Negative for sleep disturbance  Home Medications:    Current Outpatient Medications:     Prenatal MV & Min w/FA-DHA (PRENATAL ADULT GUMMY/DHA/FA) 0 4-25 MG CHEW, Chew 2 tablets daily, Disp: , Rfl:     sulfaSALAzine (AZULFIDINE) 500 mg tablet, Take 2 tablets (1,000 mg total) by mouth 2 (two) times a day Total of 4 tabs in a day, Disp: 120 tablet, Rfl: 6    Objective:    Vitals:    08/18/20 1549   BP: 120/70   BP Location: Right arm   Patient Position: Sitting   Cuff Size: Standard   Temp: 98 8 °F (37 1 °C)   TempSrc: Temporal   Weight: 56 2 kg (124 lb)   Height: 5' 5" (1 651 m)       Physical Exam  Constitutional:       General: She is not in acute distress  Appearance: She is well-developed  HENT:      Head: Normocephalic and atraumatic  Eyes:      General: Lids are normal  No scleral icterus  Conjunctiva/sclera: Conjunctivae normal    Neck:      Musculoskeletal: Neck supple  No muscular tenderness  Cardiovascular:      Rate and Rhythm: Normal rate and regular rhythm  Heart sounds: S1 normal and S2 normal  No murmur  No friction rub  Pulmonary:      Effort: Pulmonary effort is normal  No tachypnea or respiratory distress  Breath sounds: Normal breath sounds  No wheezing, rhonchi or rales  Musculoskeletal:         General: Swelling present  Comments: Right knee warmth and swelling   Skin:     General: Skin is warm and dry  Findings: No rash  Nails: There is no clubbing  Neurological:      Mental Status: She is alert  Sensory: No sensory deficit  Psychiatric:         Behavior: Behavior normal  Behavior is cooperative  Reviewed labs and imaging      Imaging:   Left Knee MRI w/o contrast 7/22/19  IMPRESSION:  Given the recent aseptic joint aspiration and prior similar MRI findings in 2016, the current findings are most consistent with active inflammatory arthritis with articular synovitis and two areas of marginal osteitis (pre-erosive disease) at the posterior weightbearing aspect of the medial femoral condyle and posterior aspect of the lateral tibial plateau       Labs:   Routine Prenatal on 07/29/2020   Component Date Value Ref Range Status    Case Report 07/29/2020    Final                    Value:Gynecologic Cytology Report                       Case: [id number]                                  Authorizing Provider:  Melecio Lee MD          Collected:           07/29/2020 5073              Ordering Location:     [facility]  Received:            07/29/2020 [address]                                                                       First Screen:          [place], [place]                                                         Specimen:    LIQUID-BASED PAP, SCREENING, Cervix, Endocervical                                          Primary Interpretation 07/29/2020 Negative for intraepithelial lesion or malignancy   Final    Specimen Adequacy 07/29/2020 Satisfactory for evaluation  Endocervical/transformation zone component present  Final    Additional Information 07/29/2020    Final                    Value: This result contains rich text formatting which cannot be displayed here   N gonorrhoeae, DNA Probe 07/29/2020 Negative  Negative Final    Chlamydia trachomatis, DNA Probe 07/29/2020 Negative  Negative Final   Appointment on 06/30/2020   Component Date Value Ref Range Status    Varicella IgG 06/30/2020 IMMUNE  IMMUNE Final    Hepatitis C Ab 06/30/2020 Non-reactive  Non-reactive Final    Hemoglobin A1C 06/30/2020 5 2  Normal 3 8-5 6%; PreDiabetic 5 7-6 4%;  Diabetic >=6 5%; Glycemic control for adults with diabetes <7 0% % Final    EAG 06/30/2020 103  mg/dl Final    Glucose, Fasting 06/30/2020 74  65 - 99 mg/dL Final      Specimen collection should occur prior to Sulfasalazine administration due to the potential for falsely depressed results  Specimen collection should occur prior to Sulfapyridine administration due to the potential for falsely elevated results   Amphetamine Screen, Ur 06/30/2020 Negative  Lfxiov=9953 ng/mL Final    Amphetamine test includes Amphetamine and Methamphetamine   Barbiturate Screen, Ur 06/30/2020 Negative  Ngfrtm=287 ng/mL Final    Cannabinoid Scrn, Ur 06/30/2020 Negative  Cutoff=50 ng/mL Final    Methadone Screen, Urine 06/30/2020 Negative  Esqfva=517 ng/mL Final    Opiate Scrn, Ur 06/30/2020 Negative  Jyoolu=637 ng/mL Final    Opiate test includes Codeine and Morphine only      Phencyclidine (PCP), Qual, Ur 06/30/2020 Negative  Cutoff=25 ng/mL Final    Benzodiazepines 06/30/2020 Negative  Ropbxh=380 ng/mL Final    Cocaine (Metab ) Urine 06/30/2020 Negative  Ntanvr=116 ng/mL Final    Propoxyphene Screen, Urine 06/30/2020 Negative  Yrkytj=377 ng/mL Final    HIV-1/HIV-2 Ab 06/30/2020 Non-Reactive  Non-Reactive Final    Color, UA 06/30/2020 Yellow   Final    Clarity, UA 06/30/2020 Cloudy   Final    Specific [place], UA 06/30/2020 1 024  1 003 - 1 030 Final    pH, UA 06/30/2020 7 0  4 5, 5 0, 5 5, 6 0, 6 5, 7 0, 7 5, 8 0 Final    Leukocytes, UA 06/30/2020 Small* Negative Final    Nitrite, UA 06/30/2020 Negative  Negative Final    Protein, UA 06/30/2020 Trace* Negative mg/dl Final    Glucose, UA 06/30/2020 Negative  Negative mg/dl Final    Ketones, UA 06/30/2020 Trace* Negative mg/dl Final    Urobilinogen, UA 06/30/2020 1 0  0 2, 1 0 E U /dl E U /dl Final    Bilirubin, UA 06/30/2020 Negative  Negative Final    Blood, UA 06/30/2020 Negative  Negative Final    Urine Culture 06/30/2020 >100,000 cfu/ml    Final    Mixed Contaminants X4    Hepatitis B Surface Ag 06/30/2020 Non-reactive  Non-reactive, NonReactive - Confirmed Final  WBC 06/30/2020 10 66* 4 31 - 10 16 Thousand/uL Final    RBC 06/30/2020 4 36  3 81 - 5 12 Million/uL Final    Hemoglobin 06/30/2020 12 4  11 5 - 15 4 g/dL Final    Hematocrit 06/30/2020 39 9  34 8 - 46 1 % Final    MCV 06/30/2020 92  82 - 98 fL Final    MCH 06/30/2020 28 4  26 8 - 34 3 pg Final    MCHC 06/30/2020 31 1* 31 4 - 37 4 g/dL Final    RDW 06/30/2020 14 1  11 6 - 15 1 % Final    MPV 06/30/2020 10 0  8 9 - 12 7 fL Final    Platelets 75/08/2620 336  149 - 390 Thousands/uL Final    nRBC 06/30/2020 0  /100 WBCs Final    Neutrophils Relative 06/30/2020 73  43 - 75 % Final    Immat GRANS % 06/30/2020 0  0 - 2 % Final    Lymphocytes Relative 06/30/2020 17  14 - 44 % Final    Monocytes Relative 06/30/2020 8  4 - 12 % Final    Eosinophils Relative 06/30/2020 1  0 - 6 % Final    Basophils Relative 06/30/2020 1  0 - 1 % Final    Neutrophils Absolute 06/30/2020 7 92* 1 85 - 7 62 Thousands/µL Final    Immature Grans Absolute 06/30/2020 0 03  0 00 - 0 20 Thousand/uL Final    Lymphocytes Absolute 06/30/2020 1 76  0 60 - 4 47 Thousands/µL Final    Monocytes Absolute 06/30/2020 0 84  0 17 - 1 22 Thousand/µL Final    Eosinophils Absolute 06/30/2020 0 06  0 00 - 0 61 Thousand/µL Final    Basophils Absolute 06/30/2020 0 05  0 00 - 0 10 Thousands/µL Final    Rubella IgG Quant 06/30/2020 144 6  >9 9 IU/mL Final    ABO Grouping 06/30/2020 A   Final    Rh Factor 06/30/2020 Positive   Final    Antibody Screen 06/30/2020 Negative   Final    Specimen Expiration Date 06/30/2020 [id number]   Final    RPR 06/30/2020 Non-Reactive  Non-Reactive Final    RBC, UA 06/30/2020 None Seen  None Seen, 0-5 /hpf Final    WBC, UA 06/30/2020 2-4* None Seen, 0-5, 5-55, 5-65 /hpf Final    Epithelial Cells 06/30/2020 Innumerable* None Seen, Occasional /hpf Final    Bacteria, UA 06/30/2020 Moderate* None Seen, Occasional /hpf Final    OTHER OBSERVATIONS 06/30/2020 Sperm Present   Final    MUCUS THREADS 06/30/2020 Moderate* None Seen Final   Appointment on 09/12/2019   Component Date Value Ref Range Status    17-OH PROGESTERONE 09/12/2019 99  ng/dL Final                              Adult Female                             Follicular        15 -  70                             Luteal            35 - 290  This test was developed and its performance characteristics  determined by [facility]  It has not been cleared or approved  by the Food and Drug Administration   DHEA-SO4 09/12/2019 150 1  110 0 - 431 7 ug/dL Final    Estradiol 09/12/2019 91 0    pg/mL Final      ESTRADIOL:    Mentruating Females: Follicular phase:  56 8-777 0  pg/mL      Mid-cycle phase:   49 9- 367 2 pg/mL      Luteal phase:      40 2-259    pg/mL     Postmenopausal females (untreated):  <11-58 3  pg/mL  On Menopausal Hormone Therapy (MHT): < 1 pg/mL    Women taking the drug Fulvestrant (Faslodex) may have falsely elevated Estradiol results  Suggest ordering [facility] Estradiol, LC/MS -test number [id number], to monitor these patients  Note: Normal ranges may not apply to patients who are transgender, non-binary, or whose legal sex, sex at birth, and gender identity differ   [facility] 09/12/2019 9 5    mIU/mL Final    Cholesterol 09/12/2019 158  50 - 200 mg/dL Final      Cholesterol:       Desirable         <200 mg/dl       Borderline         200-239 mg/dl       High              >239           Triglycerides 09/12/2019 49  <=150 mg/dL Final      Triglyceride:     Normal          <150 mg/dl     Borderline High 150-199 mg/dl     High            200-499 mg/dl        Very High       >499 mg/dl    Specimen collection should occur prior to N-Acetylcysteine or Metamizole administration due to the potential for falsely depressed results      HDL, Direct 09/12/2019 61* 40 - 60 mg/dL Final      HDL Cholesterol:       High    >60 mg/dL       Low     <41 mg/dL  Specimen collection should occur prior to Metamizole administration due to the potential for gael depressed results   LDL Calculated 09/12/2019 87  0 - 100 mg/dL Final      LDL Cholesterol:     Optimal           <100 mg/dl     Near Optimal      100-129 mg/dl     Above Optimal       Borderline High 130-159 mg/dl       High            160-189 mg/dl       Very High       >189 mg/dl         This screening LDL is a calculated result  It does not have the accuracy of the Direct Measured LDL in the monitoring of patients with hyperlipidemia and/or statin therapy  Direct Measure LDL (QPG365) must be ordered separately in these patients   Non-HDL-Chol (CHOL-HDL) 09/12/2019 97  mg/dl Final    LH 09/12/2019 29 5    mIU/mL Final      LH:   Menstruating Females:     Follicular Phase  9 2-83 2  mIU/mL     Mid-Cycle Phase   22 8-76 1 mIU/mL     Luteal Phase      0 6-13 5  mIU/mL   Postmenopausal Females:     On Menopausal Hormone Therapy(MHT) 1 1-52 4 mIU/mL     Untreated                          8 6-61 8 mIU/mL    Note: Normal ranges may not apply to patients who are transgender, non-binary, or whose legal sex, sex at birth, and gender identity differ   Prolactin 09/12/2019 12 8    ng/mL Final      PROLACTIN:     Females:    Non-pregnant    2 2-30  3  ng/mL    Pregnant        8 1-347 6 ng/mL    Post-menopausal 0 7-31 5  ng/mL     Note: Normal ranges may not apply to patients who are transgender, non-binary, or whose legal sex, sex at birth, and gender identity differ      Sex Hormone Binding 09/12/2019 72 8  24 6 - 122 0 nmol/L Final    Testosterone, Free 09/12/2019 2 3  0 0 - 4 2 pg/mL Final    TESTOSTERONE TOTAL 09/12/2019 47  8 - 48 ng/dL Final    TSH 3RD GENERATON 09/12/2019 0 445  0 358 - 3 740 uIU/mL Final      The recommended reference ranges for TSH during pregnancy are as follows:   First trimester 0 1 to 2 5 uIU/mL   Second trimester  0 2 to 3 0 uIU/mL   Third trimester 0 3 to 3 0 uIU/m    Note: Normal ranges may not apply to patients who are transgender, non-binary, or whose legal sex, sex at birth, and gender identity differ  Using supplements with high doses of biotin 20 to more than 300 times greater than the adequate daily intake for adults of 30 mcg/day as established by the [employer], can cause falsely depress results     Office Visit on 08/26/2019   Component Date Value Ref Range Status    CRP 09/12/2019 22 4* <3 0 mg/L Final    Sed Rate 09/12/2019 27* 0 - 20 mm/hour Final    Vit D, 25-Hydroxy 09/12/2019 26 9* 30 0 - 100 0 ng/mL Final    Rheumatoid Factor 09/12/2019 Negative  Negative Final    Cyclic Citrullin Peptide Ab 09/12/2019 4  0 - 19 units Final                              Negative               <20                            Weak positive      20 - 39                            Moderate positive  40 - 59                            Strong positive        >59

## 2020-08-18 NOTE — LETTER
August 18, 2020     Patient: Nely Kaur   YOB: 1995   Date of Visit: 8/18/2020       To Whom it May Concern:    Katie Austin is under my professional care  She was seen in my office on 8/18/2020  She may return to work with limitations of having alternating periods of standing and sitting, and no work that is fast-paced, for approximately the next two months  If you have any questions or concerns, please don't hesitate to call           Sincerely,          Lord Emma MD        CC: Nely Kaur

## 2020-08-26 ENCOUNTER — ROUTINE PRENATAL (OUTPATIENT)
Dept: OBGYN CLINIC | Facility: CLINIC | Age: 25
End: 2020-08-26

## 2020-08-26 VITALS — WEIGHT: 126 LBS | SYSTOLIC BLOOD PRESSURE: 102 MMHG | BODY MASS INDEX: 20.97 KG/M2 | DIASTOLIC BLOOD PRESSURE: 60 MMHG

## 2020-08-26 DIAGNOSIS — Z3A.17 17 WEEKS GESTATION OF PREGNANCY: ICD-10-CM

## 2020-08-26 DIAGNOSIS — Z34.02 ENCOUNTER FOR SUPERVISION OF NORMAL FIRST PREGNANCY IN SECOND TRIMESTER: Primary | ICD-10-CM

## 2020-08-26 PROCEDURE — PNV: Performed by: OBSTETRICS & GYNECOLOGY

## 2020-08-26 NOTE — PROGRESS NOTES
26 y o    female at 17 4 wga EGA for PNV  BP : 102/60  TWG: -3lb    Patient presents for return OB visit  She is doing well and minimal complaints  She is experiencing some lower moderate back pain  Reviewed common discomforts of pregnancy as well as change in her body associated with pregnancy  Recommended for icy Hot, heating pad, as well as pregnancy pillow to help with sleeping discomforts    Level 2 ultrasound scheduled for 2020  Follow-up in 4 weeks

## 2020-08-31 ENCOUNTER — TELEPHONE (OUTPATIENT)
Dept: RHEUMATOLOGY | Facility: CLINIC | Age: 25
End: 2020-08-31

## 2020-09-18 ENCOUNTER — TELEPHONE (OUTPATIENT)
Dept: OBGYN CLINIC | Facility: HOSPITAL | Age: 25
End: 2020-09-18

## 2020-09-18 ENCOUNTER — TELEPHONE (OUTPATIENT)
Dept: PERINATAL CARE | Facility: CLINIC | Age: 25
End: 2020-09-18

## 2020-09-18 NOTE — TELEPHONE ENCOUNTER
Attempted to reach patient by phone and left voicemail to confirm appointment for MFM ultrasound  1 support person ( must be over the age of 15) may accompany you for your appointment  If you or your support person have traveled outside the state in the past 2 weeks, please call and notify our office today #[phone]  You and your support person must wear a mask ,covering nose and mouth,during your entire visit  You and your support person will have temperature screened upon arrival     To minimize your exposure in our waiting room, please call our office prior to entering the building  Check in and rooming questions will be done via phone  We will give you directions when to enter for your appointment  Inside office # provided:  Jonathan Dozier line:  [phone]      IF you are not feeling well- cough, fever, shortness of breath or any flu like symptoms, contact your primary care physician or [address] [place], [place] [address]an Sayer    Any questions with these instructions please call Maternal Fetal Medicine nurse line today @ # [phone]

## 2020-09-21 ENCOUNTER — ROUTINE PRENATAL (OUTPATIENT)
Dept: PERINATAL CARE | Facility: CLINIC | Age: 25
End: 2020-09-21
Payer: COMMERCIAL

## 2020-09-21 VITALS
HEIGHT: 65 IN | SYSTOLIC BLOOD PRESSURE: 108 MMHG | TEMPERATURE: 98.6 F | BODY MASS INDEX: 21.39 KG/M2 | WEIGHT: 128.4 LBS | DIASTOLIC BLOOD PRESSURE: 58 MMHG | HEART RATE: 82 BPM

## 2020-09-21 DIAGNOSIS — Z3A.21 21 WEEKS GESTATION OF PREGNANCY: ICD-10-CM

## 2020-09-21 DIAGNOSIS — Z36.3 ENCOUNTER FOR ANTENATAL SCREENING FOR MALFORMATION: ICD-10-CM

## 2020-09-21 DIAGNOSIS — O35.9XX0 TERATOGEN EXPOSURE IN CURRENT PREGNANCY, SINGLE OR UNSPECIFIED FETUS: Primary | ICD-10-CM

## 2020-09-21 DIAGNOSIS — Z36.86 ENCOUNTER FOR ANTENATAL SCREENING FOR CERVICAL LENGTH: ICD-10-CM

## 2020-09-21 PROCEDURE — 76811 OB US DETAILED SNGL FETUS: CPT | Performed by: OBSTETRICS & GYNECOLOGY

## 2020-09-21 PROCEDURE — 99241 PR OFFICE CONSULTATION NEW/ESTAB PATIENT 15 MIN: CPT | Performed by: OBSTETRICS & GYNECOLOGY

## 2020-09-21 PROCEDURE — 76817 TRANSVAGINAL US OBSTETRIC: CPT | Performed by: OBSTETRICS & GYNECOLOGY

## 2020-09-21 NOTE — PROGRESS NOTES
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 [facility]  She reports the tumor was a size of a golf ball  She also states she has left knee reactive arthritis and right hip joint osteoarthritis and is seeing orthopedics on 10/15/20 after she gets the records from [facility] of her surgery  She reports that her father has diabetes and her mother had pre diabetes and her father has hypertension  She also reports many family members with depression and anxiety  She denies any use of cigarettes, alcohol or illicit drugs in pregnancy  In the past prior to pregnancy she was using marijuana  She did not complete genetic screening in this pregnancy but she is interested in it  Her fasting blood sugar was 74  Her hemoglobin A1c was 5 2 on   She reports that she was a 9 pound baby at birth    Ultrasound findings:  Her ultrasound today shows a fetus that is growing concordant with her dates  No malformations are detected however her anatomical survey is limited secondary to fetal position  The patient was informed of the findings and counseled about the limitations of the exam in detecting all forms of fetal congenital abnormalities  She denies any vaginal bleeding or uterine cramping/contractions  She does feel fetal movement  Follow up recommended:   Recommend a follow-up ultrasound in 8 weeks for growth and missed anatomy  I gave her a lab slip for a quad screen that she is aware she needs to complete this week  I reviewed her Sulfasalazine medication in [employer]   Available evidence is inconclusive or is inadequate for determining fetal risk when used in pregnant women or women of childbearing potential  Weigh the potential benefits of drug treatment against potential risks before prescribing this drug during pregnancy  I reviewed her Voltaren medication ( NSAID) in [employer]  Avoid use in pregnant women starting at 30 weeks of gestation due to the risk of premature closure of the fetal ductus arteriosus  Diclofenac may be used during pregnancy prior to 30 weeks gestation only if the potential benefit to the mother outweighs the potential risk to the fetus  It is difficult to predict the absorption of this medication since it is given topically  The majority of time (greater then 50%) was spent on counseling and coordination of care of this patient and /or family member  Approximate face to face time was 15 minutes         Marylen Bryant, MD

## 2020-09-21 NOTE — LETTER
2020     Maryan Harris MD  [address]  [address]  [facility]    Patient: Selina Tong   YOB: 1995   Date of Visit: 2020       Dear Dr Mariella Saavedra:    Thank you for referring David Luque to me for evaluation  Below are my notes for this consultation  If you have questions, please do not hesitate to call me  I look forward to following your patient along with you  Sincerely,        Lucas Waller MD        CC: No Recipients  Lucas Waller MD  2020  9:52 PM  Cherre Points  Dear Dr Mariella Saavedra     Thank you for requesting a  consultation on your patient Selina Tong for the following indications:     History [facility] is here today with her partnerChrystal  She currently is on prenatal vitamins and sulfasalazine and a Voltaren topical gel for reactive arthritis  She has a history of scoliosis for which she needed a spinal fusion and 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 [facility]  She reports the tumor was a size of a golf ball  She also states she has left knee reactive arthritis and right hip joint osteoarthritis and is seeing orthopedics on 10/15/20 after she gets the records from [facility] of her surgery  She reports that her father has diabetes and her mother had pre diabetes and her father has hypertension  She also reports many family members with depression and anxiety  She denies any use of cigarettes, alcohol or illicit drugs in pregnancy  In the past prior to pregnancy she was using marijuana  She did not complete genetic screening in this pregnancy but she is interested in it  Her fasting blood sugar was 74  Her hemoglobin A1c was 5 2 on   She reports that she was a 9 pound baby at birth    Ultrasound findings:  Her ultrasound today shows a fetus that is growing concordant with her dates  No malformations are detected however her anatomical survey is limited secondary to fetal position      The patient was informed of the findings and counseled about the limitations of the exam in detecting all forms of fetal congenital abnormalities  She denies any vaginal bleeding or uterine cramping/contractions  She does feel fetal movement  Follow up recommended:   Recommend a follow-up ultrasound in 8 weeks for growth and missed anatomy  I gave her a lab slip for a quad screen that she is aware she needs to complete this week  I reviewed her Sulfasalazine medication in [facility]  Available evidence is inconclusive or is inadequate for determining fetal risk when used in pregnant women or women of childbearing potential  Weigh the potential benefits of drug treatment against potential risks before prescribing this drug during pregnancy  I reviewed her Voltaren medication ( NSAID) in [facility]  Avoid use in pregnant women starting at 30 weeks of gestation due to the risk of premature closure of the fetal ductus arteriosus  Diclofenac may be used during pregnancy prior to 30 weeks gestation only if the potential benefit to the mother outweighs the potential risk to the fetus  The majority of time (greater then 50%) was spent on counseling and coordination of care of this patient and /or family member  Approximate face to face time was 15 minutes  MD Monico Steinberg MD  2020  3:42 PM  Sign when Signing Visit  Radha Lopez  Dear Dr Astrid Solis     Thank you for requesting a  consultation on your patient Edmond Carballo for the following indications:     History Lisha Piedra is here today with her partnerChrystal  She currently is on prenatal vitamins and sulfasalazine and a Voltaren topical gel for reactive arthritis  She has a history of scoliosis for which she needed a spinal fusion and 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 [facility] she reports the tumor was a size of a golf ball    She also states she has left knee reactive arthritis and right hip joint osteoarthritis and is seeing orthopedics on 10/15/20  After she gets the records from trying also [facility] of her surgery  She reports that her father has diabetes in her mother had pre diabetes in her father has hypertension  She also reports many family members with depression and anxiety  She denies any use of cigarettes, alcohol or illicit drugs in pregnancy  In the past prior to pregnancy she was using marijuana  She did not complete genetic screening in this pregnancy but she is interested in it  Her fasting blood sugar was 74 her hemoglobin A1c was 5 2 on 06/30  She reports that she was a 9 pound baby at birth    Ultrasound findings:  Her ultrasound today shows a fetus that is growing concordant with her dates  No malformations are detected however her anatomical survey is limited secondary to fetal position  The patient was informed of the findings and counseled about the limitations of the exam in detecting all forms of fetal congenital abnormalities  She denies any vaginal bleeding or uterine cramping/contractions  She does feel fetal movement  Follow up recommended:   Recommend a follow-up ultrasound in 8 weeks for growth and missed anatomy  I gave her a lab slip for a quad screen that she is aware she needs to complete this week    The majority of time (greater then 50%) was spent on counseling and coordination of care of this patient and /or family member  Approximate face to face time was *** minutes

## 2020-09-21 NOTE — LETTER
2020     Sid Diaz MD  [place] [address]  [address]  [facility]    Patient: Cathi Marroquin   YOB: 1995   Date of Visit: 2020       Dear Dr Miguelito Shell:    Thank you for referring Leonides Grant to me for evaluation  Below are my notes for this consultation  If you have questions, please do not hesitate to call me  I look forward to following your patient along with you  Sincerely,        Pavan Westbrook MD        CC: No Recipients  Pavan Westbrook MD  2020  9:52 PM  Ralu Langford  Dear Dr Miguelito Shell     Thank you for requesting a  consultation on your patient Cathi Marroquin for the following indications:     History Jb Moreno is here today with her partnerChrystal  She currently is on prenatal vitamins and sulfasalazine and a Voltaren topical gel for reactive arthritis  She has a history of scoliosis for which she needed a spinal fusion and 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 [facility]  She reports the tumor was a size of a golf ball  She also states she has left knee reactive arthritis and right hip joint osteoarthritis and is seeing orthopedics on 10/15/20 after she gets the records from [facility] of her surgery  She reports that her father has diabetes and her mother had pre diabetes and her father has hypertension  She also reports many family members with depression and anxiety  She denies any use of cigarettes, alcohol or illicit drugs in pregnancy  In the past prior to pregnancy she was using marijuana  She did not complete genetic screening in this pregnancy but she is interested in it  Her fasting blood sugar was 74  Her hemoglobin A1c was 5 2 on   She reports that she was a 9 pound baby at birth    Ultrasound findings:  Her ultrasound today shows a fetus that is growing concordant with her dates  No malformations are detected however her anatomical survey is limited secondary to fetal position      The patient was informed of the findings and counseled about the limitations of the exam in detecting all forms of fetal congenital abnormalities  She denies any vaginal bleeding or uterine cramping/contractions  She does feel fetal movement  Follow up recommended:   Recommend a follow-up ultrasound in 8 weeks for growth and missed anatomy  I gave her a lab slip for a quad screen that she is aware she needs to complete this week  I reviewed her Sulfasalazine medication in [facility]  Available evidence is inconclusive or is inadequate for determining fetal risk when used in pregnant women or women of childbearing potential  Weigh the potential benefits of drug treatment against potential risks before prescribing this drug during pregnancy  I reviewed her Voltaren medication ( NSAID) in [facility]  Avoid use in pregnant women starting at 30 weeks of gestation due to the risk of premature closure of the fetal ductus arteriosus  Diclofenac may be used during pregnancy prior to 30 weeks gestation only if the potential benefit to the mother outweighs the potential risk to the fetus  The majority of time (greater then 50%) was spent on counseling and coordination of care of this patient and /or family member  Approximate face to face time was 15 minutes  MD Kavitha Gorman MD  2020  3:42 PM  Sign when Signing Visit  [facility]  Dear Dr Casey Chao     Thank you for requesting a  consultation on your patient Radha Martinez for the following indications:     History Leamon Phlegm is here today with her partnerJoblue  She currently is on prenatal vitamins and sulfasalazine and a Voltaren topical gel for reactive arthritis  She has a history of scoliosis for which she needed a spinal fusion and 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 [facility] she reports the tumor was a size of a golf ball    She also states she has left knee reactive arthritis and right hip joint osteoarthritis and is seeing orthopedics on 10/15/20  After she gets the records from trying also [facility] of her surgery  She reports that her father has diabetes in her mother had pre diabetes in her father has hypertension  She also reports many family members with depression and anxiety  She denies any use of cigarettes, alcohol or illicit drugs in pregnancy  In the past prior to pregnancy she was using marijuana  She did not complete genetic screening in this pregnancy but she is interested in it  Her fasting blood sugar was 74 her hemoglobin A1c was 5 2 on 06/30  She reports that she was a 9 pound baby at birth    Ultrasound findings:  Her ultrasound today shows a fetus that is growing concordant with her dates  No malformations are detected however her anatomical survey is limited secondary to fetal position  The patient was informed of the findings and counseled about the limitations of the exam in detecting all forms of fetal congenital abnormalities  She denies any vaginal bleeding or uterine cramping/contractions  She does feel fetal movement  Follow up recommended:   Recommend a follow-up ultrasound in 8 weeks for growth and missed anatomy  I gave her a lab slip for a quad screen that she is aware she needs to complete this week    The majority of time (greater then 50%) was spent on counseling and coordination of care of this patient and /or family member  Approximate face to face time was *** minutes

## 2020-09-21 NOTE — LETTER
2020     Elena Gerber MD  [facility] [address]  [address]  [facility]    Patient: Viktoria Duran   YOB: 1995   Date of Visit: 2020       Dear Dr Nikhil Blas:    Thank you for referring Tammy Pereira to me for evaluation  Below are my notes for this consultation  If you have questions, please do not hesitate to call me  I look forward to following your patient along with you  Sincerely,        Zainab Bauer MD        CC: No Recipients  Zainab Bauer MD  2020  9:57 PM  Sandra Yancey  Dear Dr Nikhil Blas     Thank you for requesting a  consultation on your patient Viktoria Duran for the following indications:     Tory Lewis is here today with her partnerChrystal  She currently is on prenatal vitamins and sulfasalazine and a Voltaren topical gel for reactive arthritis  She has a history of scoliosis for which she needed a spinal fusion and 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 [facility]  She reports the tumor was a size of a golf ball  She also states she has left knee reactive arthritis and right hip joint osteoarthritis and is seeing orthopedics on 10/15/20 after she gets the records from [facility] of her surgery  She reports that her father has diabetes and her mother had pre diabetes and her father has hypertension  She also reports many family members with depression and anxiety  She denies any use of cigarettes, alcohol or illicit drugs in pregnancy  In the past prior to pregnancy she was using marijuana  She did not complete genetic screening in this pregnancy but she is interested in it  Her fasting blood sugar was 74  Her hemoglobin A1c was 5 2 on   She reports that she was a 9 pound baby at birth    Ultrasound findings:  Her ultrasound today shows a fetus that is growing concordant with her dates  No malformations are detected however her anatomical survey is limited secondary to fetal position      The patient was informed of the findings and counseled about the limitations of the exam in detecting all forms of fetal congenital abnormalities  She denies any vaginal bleeding or uterine cramping/contractions  She does feel fetal movement  Follow up recommended:   Recommend a follow-up ultrasound in 8 weeks for growth and missed anatomy  I gave her a lab slip for a quad screen that she is aware she needs to complete this week  I reviewed her Sulfasalazine medication in [facility]  Available evidence is inconclusive or is inadequate for determining fetal risk when used in pregnant women or women of childbearing potential  Weigh the potential benefits of drug treatment against potential risks before prescribing this drug during pregnancy  I reviewed her Voltaren medication ( NSAID) in [facility]  Avoid use in pregnant women starting at 30 weeks of gestation due to the risk of premature closure of the fetal ductus arteriosus  Diclofenac may be used during pregnancy prior to 30 weeks gestation only if the potential benefit to the mother outweighs the potential risk to the fetus  It is difficult to predict the absorption of this medication since it is given topically  The majority of time (greater then 50%) was spent on counseling and coordination of care of this patient and /or family member  Approximate face to face time was 15 minutes  MD Los Villela MD  2020  9:55 PM  Sign when Signing Maria Antonia Sutherland  Dear Dr Rebeca Stern     Thank you for requesting a  consultation on your patient Kirk Copeland for the following indications:     History Som Paulson is here today with her partnerChrystal  She currently is on prenatal vitamins and sulfasalazine and a Voltaren topical gel for reactive arthritis  She has a history of scoliosis for which she needed a spinal fusion and 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 [facility]  She reports the tumor was a size of a golf ball    She also states she has left knee reactive arthritis and right hip joint osteoarthritis and is seeing orthopedics on 10/15/20 after she gets the records from [place] of her surgery  She reports that her father has diabetes and her mother had pre diabetes and her father has hypertension  She also reports many family members with depression and anxiety  She denies any use of cigarettes, alcohol or illicit drugs in pregnancy  In the past prior to pregnancy she was using marijuana  She did not complete genetic screening in this pregnancy but she is interested in it  Her fasting blood sugar was 74  Her hemoglobin A1c was 5 2 on 06/30  She reports that she was a 9 pound baby at birth    Ultrasound findings:  Her ultrasound today shows a fetus that is growing concordant with her dates  No malformations are detected however her anatomical survey is limited secondary to fetal position  The patient was informed of the findings and counseled about the limitations of the exam in detecting all forms of fetal congenital abnormalities  She denies any vaginal bleeding or uterine cramping/contractions  She does feel fetal movement  Follow up recommended:   Recommend a follow-up ultrasound in 8 weeks for growth and missed anatomy  I gave her a lab slip for a quad screen that she is aware she needs to complete this week  I reviewed her Sulfasalazine medication in [facility]  Available evidence is inconclusive or is inadequate for determining fetal risk when used in pregnant women or women of childbearing potential  Weigh the potential benefits of drug treatment against potential risks before prescribing this drug during pregnancy  I reviewed her Voltaren medication ( NSAID) in [facility]  Avoid use in pregnant women starting at 30 weeks of gestation due to the risk of premature closure of the fetal ductus arteriosus   Diclofenac may be used during pregnancy prior to 30 weeks gestation only if the potential benefit to the mother outweighs the potential risk to the fetus  The majority of time (greater then 50%) was spent on counseling and coordination of care of this patient and /or family member  Approximate face to face time was 15 minutes         Simeon Prasad MD

## 2020-09-26 ENCOUNTER — APPOINTMENT (OUTPATIENT)
Dept: LAB | Facility: MEDICAL CENTER | Age: 25
End: 2020-09-26
Payer: COMMERCIAL

## 2020-09-26 PROCEDURE — 84702 CHORIONIC GONADOTROPIN TEST: CPT | Performed by: OBSTETRICS & GYNECOLOGY

## 2020-09-26 PROCEDURE — 82677 ASSAY OF ESTRIOL: CPT | Performed by: OBSTETRICS & GYNECOLOGY

## 2020-09-26 PROCEDURE — 86336 INHIBIN A: CPT | Performed by: OBSTETRICS & GYNECOLOGY

## 2020-09-26 PROCEDURE — 36415 COLL VENOUS BLD VENIPUNCTURE: CPT | Performed by: OBSTETRICS & GYNECOLOGY

## 2020-09-26 PROCEDURE — 82105 ALPHA-FETOPROTEIN SERUM: CPT | Performed by: OBSTETRICS & GYNECOLOGY

## 2020-09-28 ENCOUNTER — ROUTINE PRENATAL (OUTPATIENT)
Dept: OBGYN CLINIC | Facility: CLINIC | Age: 25
End: 2020-09-28

## 2020-09-28 VITALS — BODY MASS INDEX: 21.63 KG/M2 | SYSTOLIC BLOOD PRESSURE: 104 MMHG | WEIGHT: 130 LBS | DIASTOLIC BLOOD PRESSURE: 62 MMHG

## 2020-09-28 DIAGNOSIS — Z34.02 ENCOUNTER FOR SUPERVISION OF NORMAL FIRST PREGNANCY IN SECOND TRIMESTER: Primary | ICD-10-CM

## 2020-09-28 DIAGNOSIS — Z3A.22 22 WEEKS GESTATION OF PREGNANCY: ICD-10-CM

## 2020-09-28 PROCEDURE — PNV: Performed by: OBSTETRICS & GYNECOLOGY

## 2020-09-28 NOTE — PROGRESS NOTES
26 y o    female at 24w0d EGA for PNV  BP : 104/62  TWlbs  Denies cramping or bleeding  Feeling movement  On Voltaren  Discussed that she should stop this after 30 weeks gestation  She will discuss with her rheumatologist at her next appointment  She has follow-up anatomy scan at 28 weeks  No other questions or concerns today

## 2020-09-30 LAB
2ND TRIMESTER 4 SCREEN SERPL-IMP: NORMAL
2ND TRIMESTER 4 SCREEN SERPL-IMP: NORMAL
AFP ADJ MOM SERPL: 0.91
AFP SERPL-MCNC: 72.1 NG/ML
AGE AT DELIVERY: 24.5 YR
FET TS 18 RISK FROM MAT AGE: NORMAL
FET TS 21 RISK FROM MAT AGE: 1052
GA METHOD: NORMAL
GA: 21.7 WEEKS
HCG ADJ MOM SERPL: 0.67
HCG SERPL-ACNC: NORMAL MIU/ML
IDDM PATIENT QL: NO
INHIBIN A ADJ MOM SERPL: 0.48
INHIBIN A SERPL-MCNC: 130.53 PG/ML
KARYOTYP BLD/T: NORMAL
MULTIPLE PREGNANCY: NO
NEURAL TUBE DEFECT RISK FETUS: NORMAL %
SERVICE CMNT-IMP: NORMAL
TS 18 RISK FETUS: NORMAL
TS 21 RISK FETUS: NORMAL
U ESTRIOL ADJ MOM SERPL: 0.9
U ESTRIOL SERPL-MCNC: 3.1 NG/ML

## 2020-10-06 ENCOUNTER — OFFICE VISIT (OUTPATIENT)
Dept: RHEUMATOLOGY | Facility: CLINIC | Age: 25
End: 2020-10-06
Payer: COMMERCIAL

## 2020-10-06 VITALS
WEIGHT: 128 LBS | BODY MASS INDEX: 21.33 KG/M2 | DIASTOLIC BLOOD PRESSURE: 68 MMHG | TEMPERATURE: 98.7 F | HEIGHT: 65 IN | SYSTOLIC BLOOD PRESSURE: 112 MMHG

## 2020-10-06 DIAGNOSIS — M02.30 REACTIVE ARTHRITIS (HCC): Primary | ICD-10-CM

## 2020-10-06 DIAGNOSIS — M19.90 INFLAMMATORY ARTHRITIS: ICD-10-CM

## 2020-10-06 PROCEDURE — 99214 OFFICE O/P EST MOD 30 MIN: CPT | Performed by: INTERNAL MEDICINE

## 2020-10-06 RX ORDER — SULFASALAZINE 500 MG/1
1000 TABLET ORAL 2 TIMES DAILY
Qty: 120 TABLET | Refills: 6 | Status: SHIPPED | OUTPATIENT
Start: 2020-10-06 | End: 2021-08-09 | Stop reason: SDUPTHER

## 2020-10-06 NOTE — PROGRESS NOTES
Assessment and Plan: Shanell Burrell is a 26 y o  female who presents for follow-up of her reactive arthritis  Left knee swelling/pain has much improved since steroid injection last visit  Asked patient to take sulfasalazine total of 4 tabs in a day, which is the therapeutic dose; she was mistakenly only taking 2 tabs a day, so asked her to increase to 1,000mg po bid, which is safe to continue throughout her pregnancy (consulted with MFM)  She is to continue diclofenac gel for joint pain as needed until 30 weeks gestation  Plan:  Diagnoses and all orders for this visit:    Reactive arthritis ([address] )    Inflammatory arthritis  -     sulfaSALAzine (AZULFIDINE) 500 mg tablet; Take 2 tablets (1,000 mg total) by mouth 2 (two) times a day Total of 4 tabs in a day    Follow-up plan: Return to clinic in 6 months      Rheumatic Disease Summary  Ryanne Lopez is a 25 y  o  female who originally presented on 8/26/19 as a Rheumatology consult referred by Orthopedics provider Dr Samantha Knight evaluation of inflammatory arthritis found on recent left knee MRI   Patient was determined to have an inflammatory monoarthritis based on physical exam and MRI, possibly secondary to reactive arthritis of unknown etiology; she may have been exposed to an infection while working in the [employer] in [place] back in 2016  Administered a Kenalog steroid injection into her left knee at her initial visit to acutely help with her joint pain and inflammation  Also started patient on DMARD therapy with sulfasalazine 500 mg p o  B i d  for long-term control of her inflammatory arthritis; this is a relatively safe medication that does not require drug toxicity monitoring and is safe in pregnancy if patient decides to get pregnant   While giving sulfasalazine time to take effect, had also prescribed for patient naproxen 500 mg p  O  B i d  with meals to take as needed  Have ordered a rheumatoid factor and anti-CCP to evaluate for underlying rheumatoid arthritis      She presented for follow-up on 10/21/19 for her 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, which had been under control s/p intra-articular steroid injection at her prior clinic visit  Highest on differential for patient her age to have an inflammatory monoarthritis is reactive arthritis  Patient was asked to restart sulfasalazine 500mg po bid for more long-term management of her inflammatory arthritis; may be able to discontinue it in 6 months if she remains symptom-free  She can continue taking naproxen 500mg po bid as needed, which patient had not been requiring much  Prescribed amoxicillin 500mg po q8 hours for 7 days to treat patient's group B strep UTI       Next clinic visit was 6/30/20  Her reactive arthritis seemed to be active again involving her left knee since stopping sulfasalazine in May due to finding out about being pregnant  Assured patient that it is safe to take sulfasalazine in pregnancy, and asked her to restart it at 500mg po bid  Also prescribed diclofenac gel as needed for her joint pain instead of oral NSAIDs due to pregnancy      She then presented 8/18/20 for follow-up of reactive arthritis, with her left knee being especially active  Was successfully administered a left knee steroid injection in clinic  Patient was to continue her bid sulfasalazine and diclofenac gel as needed for joint pain      HPI  Kathy Hi is a 26 y o   female who presents for follow-up of reactive arthritis  He left knee pain and swelling has significantly improved s/p steroid injection on 8/18/20  She is currently an MA student at [employer]  Last workday at [facility] was 8/19/20  She has only been taking 1 tab of SSZ twice a day (500mg po bid)      The following portions of the patient's history were reviewed and updated as appropriate: allergies, current medications, past family history, past medical history, past social history, past surgical history and problem list     Review of Systems:   Review of Systems   Constitutional: Negative for fatigue and unexpected weight change  HENT: Negative for mouth sores  Respiratory: Negative for cough and shortness of breath  Gastrointestinal: Negative for constipation and diarrhea  Musculoskeletal: Positive for arthralgias and joint swelling  Negative for back pain and myalgias  Skin: Negative for color change and rash  Neurological: Negative for weakness  Psychiatric/Behavioral: Negative for sleep disturbance  Home Medications:    Current Outpatient Medications:     Prenatal MV & Min w/FA-DHA (PRENATAL ADULT GUMMY/DHA/FA) 0 4-25 MG CHEW, Chew 2 tablets daily, Disp: , Rfl:     sulfaSALAzine (AZULFIDINE) 500 mg tablet, Take 2 tablets (1,000 mg total) by mouth 2 (two) times a day Total of 4 tabs in a day, Disp: 120 tablet, Rfl: 6    Objective:    Vitals:    10/06/20 1605   BP: 112/68   BP Location: Right arm   Patient Position: Sitting   Cuff Size: Standard   Temp: 98 7 °F (37 1 °C)   TempSrc: Temporal   Weight: 58 1 kg (128 lb)   Height: 5' 5" (1 651 m)       Physical Exam  Constitutional:       General: She is not in acute distress  Appearance: She is well-developed  HENT:      Head: Normocephalic and atraumatic  Eyes:      General: Lids are normal  No scleral icterus  Conjunctiva/sclera: Conjunctivae normal    Neck:      Musculoskeletal: Neck supple  Pulmonary:      Effort: Pulmonary effort is normal  No tachypnea, accessory muscle usage or respiratory distress  Musculoskeletal:         General: Swelling present  Comments: Left knee swelling   Skin:     General: Skin is dry  Findings: No rash  Neurological:      Mental Status: She is alert  Psychiatric:         Behavior: Behavior normal  Behavior is cooperative  Reviewed labs and imaging      Imaging:   Left Knee MRI w/o contrast 7/22/19  IMPRESSION:  Given the recent aseptic joint aspiration and prior similar MRI findings in 2016, the current findings are most consistent with active inflammatory arthritis with articular synovitis and two areas of marginal osteitis (pre-erosive disease) at the posterior weightbearing aspect of the medial femoral condyle and posterior aspect of the lateral tibial plateau       Labs:   Routine Prenatal on 09/21/2020   Component Date Value Ref Range Status    Results: 09/26/2020 Report   Final    AFP Tetra    TEST RESULTS (AFP) 09/26/2020 *Screen Negative*   Final    Gestational Age 09/26/2020 21 7  WEEKS Final    Gestat  Age Based On 09/26/2020 LMP   Final                                  04/27/2020    MATERNAL AGE AT GENA 09/26/2020 24 5  yr Final    Race 09/26/2020 Other   Final    Weight 09/26/2020 128  lbs Final    Insulin Dep  Diabetic 09/26/2020 No   Final    MULTIPLE GESTATION (QUAD) 09/26/2020 No   Final    MSAFP Mom 09/26/2020 0 91   Final    MSHCG 09/26/2020 02583  mIU/mL Final    MSHCG Mom 09/26/2020 0 67   Final    uE3 VALUE 09/26/2020 3 10  ng/mL Final    uE3 Mom 09/26/2020 0 90   Final    MICHELE Value (EIA) 09/26/2020 130 53  pg/mL Final    MICHELE Mom Value 09/26/2020 0 48   Final    OSBR RISK 1 IN 09/26/2020 19851   Final    DSR (Second Trimester) 1 IN 09/26/2020 90395   Final    DSR (By Age)    1 IN 09/26/2020 1052   Final    T18 Risk 09/26/2020 Not increased   Final    T18 (By Age) 09/26/2020 1:4098   Final    AFP Quad Interp 09/26/2020 Comment   Final    Interpretation: Screen Negative  This result is screen negative for fetal OSB, Down Syndrome and  Trisomy 18  The AFP MoM and patient specific risks calculated are  based on the gestational age and the clinical information provided  This test can identify up to 80% of open fetal neural tube defects  Closed neural tube defects and some open defects may not be detected  by this test   The combination of maternal age, AFP, hCG, uE3, and  MICHELE identifies 75-80% of fetal Down Syndrome    The combination of  maternal age, AFP, hCG and uE3 identifies 60% of Trisomy 18  pregnancies  The [employer]  recommends amniocentesis be offered to women age 28 and older  Recalculations are not recommended when gestational dating by LMP and  ultrasound are within 10 days   AFP Quad Cmts 09/26/2020 Comment   Final    Deisi Bermudez, Ph D , [facility]  Director  References: Available Upon Request   Multiples Of Median Cutoffs     Abbreviation Definitions      For AFP Elevations          IDD- Insulin Dep Diabetes  Jiménez  2 5   Black  2 8     OSBR- Open Spina Bifida  IDD        2 0   Twins  4 5            Risk  DSR Cutoff 1:270                DSR- Down Syndrome Risk  T18 Cutoff 1:100                T18- Trisomy 18  Down Syndrome and Trisomy 18 screening are considered  Investigational  For further inquiries contact Brisa Villatoro  at [phone]   AFP 09/26/2020 72 1  ng/mL Final   Routine Prenatal on 07/29/2020   Component Date Value Ref Range Status    Case Report 07/29/2020    Final                    Value:Gynecologic Cytology Report                       Case: [id number]                                  Authorizing Provider:  Regan Kwan MD          Collected:           07/29/2020 1626              Ordering Location:     [facility]  Received:            07/29/2020 1626                                     Health                                                                       First Screen:          CLYDE Shen                                                         Specimen:    LIQUID-BASED PAP, SCREENING, Cervix, Endocervical                                          Primary Interpretation 07/29/2020 Negative for intraepithelial lesion or malignancy   Final    Specimen Adequacy 07/29/2020 Satisfactory for evaluation  Endocervical/transformation zone component present     Final    Additional Information 07/29/2020    Final Value:This result contains rich text formatting which cannot be displayed here   N gonorrhoeae, DNA Probe 07/29/2020 Negative  Negative Final    Chlamydia trachomatis, DNA Probe 07/29/2020 Negative  Negative Final   Appointment on 06/30/2020   Component Date Value Ref Range Status    Varicella IgG 06/30/2020 IMMUNE  IMMUNE Final    Hepatitis C Ab 06/30/2020 Non-reactive  Non-reactive Final    Hemoglobin A1C 06/30/2020 5 2  Normal 3 8-5 6%; PreDiabetic 5 7-6 4%; Diabetic >=6 5%; Glycemic control for adults with diabetes <7 0% % Final    EAG 06/30/2020 103  mg/dl Final    Glucose, Fasting 06/30/2020 74  65 - 99 mg/dL Final      Specimen collection should occur prior to Sulfasalazine administration due to the potential for falsely depressed results  Specimen collection should occur prior to Sulfapyridine administration due to the potential for falsely elevated results   Amphetamine Screen, Ur 06/30/2020 Negative  Khquwh=8032 ng/mL Final    Amphetamine test includes Amphetamine and Methamphetamine   Barbiturate Screen, Ur 06/30/2020 Negative  Irgeew=389 ng/mL Final    Cannabinoid Scrn, Ur 06/30/2020 Negative  Cutoff=50 ng/mL Final    Methadone Screen, Urine 06/30/2020 Negative  Lgvilc=059 ng/mL Final    Opiate Scrn, Ur 06/30/2020 Negative  Mbppmz=268 ng/mL Final    Opiate test includes Codeine and Morphine only      Phencyclidine (PCP), Qual, Ur 06/30/2020 Negative  Cutoff=25 ng/mL Final    Benzodiazepines 06/30/2020 Negative  Tfngao=458 ng/mL Final    Cocaine (Metab ) Urine 06/30/2020 Negative  Fbgzfg=748 ng/mL Final    Propoxyphene Screen, Urine 06/30/2020 Negative  Awaqbr=539 ng/mL Final    HIV-1/HIV-2 Ab 06/30/2020 Non-Reactive  Non-Reactive Final    Color, UA 06/30/2020 Yellow   Final    Clarity, UA 06/30/2020 Cloudy   Final    Specific [place], UA 06/30/2020 1 024  1 003 - 1 030 Final    pH, UA 06/30/2020 7 0  4 5, 5 0, 5 5, 6 0, 6 5, 7 0, 7 5, 8 0 Final    Leukocytes, UA 06/30/2020 Small* Negative Final    Nitrite, UA 06/30/2020 Negative  Negative Final    Protein, UA 06/30/2020 Trace* Negative mg/dl Final    Glucose, UA 06/30/2020 Negative  Negative mg/dl Final    Ketones, UA 06/30/2020 Trace* Negative mg/dl Final    Urobilinogen, UA 06/30/2020 1 0  0 2, 1 0 E U /dl E U /dl Final    Bilirubin, UA 06/30/2020 Negative  Negative Final    Blood, UA 06/30/2020 Negative  Negative Final    Urine Culture 06/30/2020 >100,000 cfu/ml    Final    Mixed Contaminants X4    Hepatitis B Surface Ag 06/30/2020 Non-reactive  Non-reactive, NonReactive - Confirmed Final    WBC 06/30/2020 10 66* 4 31 - 10 16 Thousand/uL Final    RBC 06/30/2020 4 36  3 81 - 5 12 Million/uL Final    Hemoglobin 06/30/2020 12 4  11 5 - 15 4 g/dL Final    Hematocrit 06/30/2020 39 9  34 8 - 46 1 % Final    MCV 06/30/2020 92  82 - 98 fL Final    MCH 06/30/2020 28 4  26 8 - 34 3 pg Final    MCHC 06/30/2020 31 1* 31 4 - 37 4 g/dL Final    RDW 06/30/2020 14 1  11 6 - 15 1 % Final    MPV 06/30/2020 10 0  8 9 - 12 7 fL Final    Platelets 81/13/9111 336  149 - 390 Thousands/uL Final    nRBC 06/30/2020 0  /100 WBCs Final    Neutrophils Relative 06/30/2020 73  43 - 75 % Final    Immat GRANS % 06/30/2020 0  0 - 2 % Final    Lymphocytes Relative 06/30/2020 17  14 - 44 % Final    Monocytes Relative 06/30/2020 8  4 - 12 % Final    Eosinophils Relative 06/30/2020 1  0 - 6 % Final    Basophils Relative 06/30/2020 1  0 - 1 % Final    Neutrophils Absolute 06/30/2020 7 92* 1 85 - 7 62 Thousands/µL Final    Immature Grans Absolute 06/30/2020 0 03  0 00 - 0 20 Thousand/uL Final    Lymphocytes Absolute 06/30/2020 1 76  0 60 - 4 47 Thousands/µL Final    Monocytes Absolute 06/30/2020 0 84  0 17 - 1 22 Thousand/µL Final    Eosinophils Absolute 06/30/2020 0 06  0 00 - 0 61 Thousand/µL Final    Basophils Absolute 06/30/2020 0 05  0 00 - 0 10 Thousands/µL Final    Rubella IgG Quant 06/30/2020 144 6  >9 9 IU/mL Final    ABO Grouping 06/30/2020 A   Final    Rh Factor 06/30/2020 Positive   Final    Antibody Screen 06/30/2020 Negative   Final    Specimen Expiration Date 06/30/2020 91215507   Final    RPR 06/30/2020 Non-Reactive  Non-Reactive Final    RBC, UA 06/30/2020 None Seen  None Seen, 0-5 /hpf Final    WBC, UA 06/30/2020 2-4* None Seen, 0-5, 5-55, 5-65 /hpf Final    Epithelial Cells 06/30/2020 Innumerable* None Seen, Occasional /hpf Final    Bacteria, UA 06/30/2020 Moderate* None Seen, Occasional /hpf Final    OTHER OBSERVATIONS 06/30/2020 Sperm Present   Final    MUCUS THREADS 06/30/2020 Moderate* None Seen Final

## 2020-10-06 NOTE — PATIENT INSTRUCTIONS
Continue diclofenac gel as needed until 30 weeks  Increase sulfasalazine to 2 tabs twice a day (4 in a day); will be in touch regarding whether you can continue it throughout your pregnancy    Return to clinic in 6 months    Reactive Arthritis    What is reactive arthritis? -- Reactive arthritis is a kind of arthritis that happens after certain infections  It causes pain and swelling in joints  Reactive arthritis usually affects people who have or just had:  ?Food poisoning or another kind of infection of the intestines  ? An infection that you catch through sex   In the past, reactive arthritis was sometimes called "Cornelio syndrome "  What are the symptoms of reactive arthritis? -- The main symptoms of reactive arthritis are pain and swelling in the joints  These usually happen 1 to 4 weeks after an infection  In most cases, the symptoms affect only a few joints, usually in the knees, ankles, or feet  Other symptoms might include:  ?Pain in the tendons in the feet and ankles (tendons are tough bands of tissue that connect muscles to bones)  ? Irritation of the eye called "conjunctivitis" (also known as pink eye)  ? Pain when urinating  Is there a test for reactive arthritis? -- No  There is no test  But if your doctor or nurse can figure out what type of germ caused your infection, he or she should be able to tell if you have reactive arthritis  Your doctor or nurse can test your stool (bowel movements) or urine to look for certain kinds of germs  If your doctor or nurse can't tell what germs caused your infection, he or she will study your symptoms to decide how likely it is that you have reactive arthritis  How is reactive arthritis treated? -- The symptoms of reactive arthritis are treated with medicines, including:  ? NSAIDs - This is a large group of medicines that includes ibuprofen (sample brand names: Advil, Motrin) and naproxen (sample brand name: Aleve)   Your doctor or nurse might prescribe a dose higher than you would normally take to relieve pain  ? Other medicines - If NSAIDs do not help your symptoms, your doctor or nurse might give you a shot of steroids  Steroids help reduce inflammation  These are not the same as the steroids some athletes take illegally  Your doctor might also give you more steroids to take at home  There are also other medicines that might help if your symptoms do not get better with NSAIDs or steroids  ? Eye drops - Special drops can help relieve redness and irritation in your eyes  But if you have eye pain or trouble seeing, visit an eye doctor to make sure you don't have a more serious problem  Antibiotics do not usually help with the joint symptoms of reactive arthritis  Even so, your doctor or nurse might prescribe them if you still have an infection  When will I feel better? -- Most people with reactive arthritis get better quickly  Some people continue to notice symptoms, either constantly or just once in a while  If your back gets very stiff and sore, see your doctor or nurse   This might mean that your reactive arthritis has turned into a more serious problem

## 2020-10-13 ENCOUNTER — OFFICE VISIT (OUTPATIENT)
Dept: OBGYN CLINIC | Facility: HOSPITAL | Age: 25
End: 2020-10-13
Payer: COMMERCIAL

## 2020-10-13 VITALS
WEIGHT: 134.2 LBS | SYSTOLIC BLOOD PRESSURE: 125 MMHG | HEIGHT: 65 IN | BODY MASS INDEX: 22.36 KG/M2 | DIASTOLIC BLOOD PRESSURE: 86 MMHG | HEART RATE: 112 BPM

## 2020-10-13 DIAGNOSIS — M16.11 PRIMARY OSTEOARTHRITIS OF ONE HIP, RIGHT: Primary | ICD-10-CM

## 2020-10-13 PROCEDURE — 99213 OFFICE O/P EST LOW 20 MIN: CPT | Performed by: ORTHOPAEDIC SURGERY

## 2020-10-27 ENCOUNTER — ROUTINE PRENATAL (OUTPATIENT)
Dept: OBGYN CLINIC | Facility: CLINIC | Age: 25
End: 2020-10-27

## 2020-10-27 VITALS — WEIGHT: 137 LBS | SYSTOLIC BLOOD PRESSURE: 122 MMHG | DIASTOLIC BLOOD PRESSURE: 62 MMHG | BODY MASS INDEX: 22.8 KG/M2

## 2020-10-27 DIAGNOSIS — Z87.39 HISTORY OF SCOLIOSIS: ICD-10-CM

## 2020-10-27 DIAGNOSIS — Z3A.26 26 WEEKS GESTATION OF PREGNANCY: Primary | ICD-10-CM

## 2020-10-27 DIAGNOSIS — Z34.02 ENCOUNTER FOR SUPERVISION OF NORMAL FIRST PREGNANCY IN SECOND TRIMESTER: ICD-10-CM

## 2020-10-27 PROCEDURE — PNV: Performed by: OBSTETRICS & GYNECOLOGY

## 2020-11-09 ENCOUNTER — APPOINTMENT (OUTPATIENT)
Dept: LAB | Facility: HOSPITAL | Age: 25
End: 2020-11-09
Attending: OBSTETRICS & GYNECOLOGY
Payer: COMMERCIAL

## 2020-11-09 DIAGNOSIS — Z34.02 ENCOUNTER FOR SUPERVISION OF NORMAL FIRST PREGNANCY IN SECOND TRIMESTER: ICD-10-CM

## 2020-11-09 LAB
ERYTHROCYTE [DISTWIDTH] IN BLOOD BY AUTOMATED COUNT: 14.4 % (ref 11.6–15.1)
GLUCOSE 1H P 50 G GLC PO SERPL-MCNC: 63 MG/DL
HCT VFR BLD AUTO: 35.9 % (ref 34.8–46.1)
HGB BLD-MCNC: 11.3 G/DL (ref 11.5–15.4)
MCH RBC QN AUTO: 29.3 PG (ref 26.8–34.3)
MCHC RBC AUTO-ENTMCNC: 31.5 G/DL (ref 31.4–37.4)
MCV RBC AUTO: 93 FL (ref 82–98)
PLATELET # BLD AUTO: 272 THOUSANDS/UL (ref 149–390)
PMV BLD AUTO: 9.4 FL (ref 8.9–12.7)
RBC # BLD AUTO: 3.86 MILLION/UL (ref 3.81–5.12)
WBC # BLD AUTO: 10.83 THOUSAND/UL (ref 4.31–10.16)

## 2020-11-09 PROCEDURE — 82950 GLUCOSE TEST: CPT

## 2020-11-09 PROCEDURE — 85027 COMPLETE CBC AUTOMATED: CPT

## 2020-11-09 PROCEDURE — 36415 COLL VENOUS BLD VENIPUNCTURE: CPT

## 2020-11-09 PROCEDURE — 86592 SYPHILIS TEST NON-TREP QUAL: CPT

## 2020-11-10 LAB — RPR SER QL: NORMAL

## 2020-11-13 ENCOUNTER — TELEPHONE (OUTPATIENT)
Dept: PERINATAL CARE | Facility: OTHER | Age: 25
End: 2020-11-13

## 2020-11-16 ENCOUNTER — ULTRASOUND (OUTPATIENT)
Dept: PERINATAL CARE | Facility: CLINIC | Age: 25
End: 2020-11-16
Payer: COMMERCIAL

## 2020-11-16 VITALS
HEIGHT: 65 IN | HEART RATE: 109 BPM | WEIGHT: 139.2 LBS | DIASTOLIC BLOOD PRESSURE: 67 MMHG | BODY MASS INDEX: 23.19 KG/M2 | TEMPERATURE: 96.6 F | SYSTOLIC BLOOD PRESSURE: 112 MMHG

## 2020-11-16 DIAGNOSIS — Z3A.29 29 WEEKS GESTATION OF PREGNANCY: Primary | ICD-10-CM

## 2020-11-16 DIAGNOSIS — M02.30 REACTIVE ARTHRITIS (HCC): ICD-10-CM

## 2020-11-16 PROCEDURE — 76816 OB US FOLLOW-UP PER FETUS: CPT | Performed by: OBSTETRICS & GYNECOLOGY

## 2020-11-18 ENCOUNTER — ROUTINE PRENATAL (OUTPATIENT)
Dept: OBGYN CLINIC | Facility: CLINIC | Age: 25
End: 2020-11-18
Payer: COMMERCIAL

## 2020-11-18 VITALS — DIASTOLIC BLOOD PRESSURE: 74 MMHG | BODY MASS INDEX: 23.13 KG/M2 | SYSTOLIC BLOOD PRESSURE: 116 MMHG | WEIGHT: 139 LBS

## 2020-11-18 DIAGNOSIS — Z23 NEED FOR INFLUENZA VACCINATION: ICD-10-CM

## 2020-11-18 DIAGNOSIS — Z34.03 ENCOUNTER FOR SUPERVISION OF NORMAL FIRST PREGNANCY IN THIRD TRIMESTER: Primary | ICD-10-CM

## 2020-11-18 DIAGNOSIS — G89.29 CHRONIC RIGHT HIP PAIN: ICD-10-CM

## 2020-11-18 DIAGNOSIS — M25.551 CHRONIC RIGHT HIP PAIN: ICD-10-CM

## 2020-11-18 DIAGNOSIS — Z23 NEED FOR TDAP VACCINATION: ICD-10-CM

## 2020-11-18 DIAGNOSIS — Z3A.29 29 WEEKS GESTATION OF PREGNANCY: ICD-10-CM

## 2020-11-18 PROCEDURE — 90471 IMMUNIZATION ADMIN: CPT | Performed by: OBSTETRICS & GYNECOLOGY

## 2020-11-18 PROCEDURE — 90715 TDAP VACCINE 7 YRS/> IM: CPT | Performed by: OBSTETRICS & GYNECOLOGY

## 2020-11-18 PROCEDURE — 90686 IIV4 VACC NO PRSV 0.5 ML IM: CPT | Performed by: OBSTETRICS & GYNECOLOGY

## 2020-11-18 PROCEDURE — PNV: Performed by: OBSTETRICS & GYNECOLOGY

## 2020-11-18 PROCEDURE — 90472 IMMUNIZATION ADMIN EACH ADD: CPT | Performed by: OBSTETRICS & GYNECOLOGY

## 2020-11-30 ENCOUNTER — ROUTINE PRENATAL (OUTPATIENT)
Dept: OBGYN CLINIC | Facility: CLINIC | Age: 25
End: 2020-11-30

## 2020-11-30 VITALS — WEIGHT: 142 LBS | DIASTOLIC BLOOD PRESSURE: 70 MMHG | SYSTOLIC BLOOD PRESSURE: 112 MMHG | BODY MASS INDEX: 23.63 KG/M2

## 2020-11-30 DIAGNOSIS — Z34.03 ENCOUNTER FOR SUPERVISION OF NORMAL FIRST PREGNANCY IN THIRD TRIMESTER: Primary | ICD-10-CM

## 2020-11-30 DIAGNOSIS — G89.29 CHRONIC RIGHT HIP PAIN: ICD-10-CM

## 2020-11-30 DIAGNOSIS — Z3A.31 31 WEEKS GESTATION OF PREGNANCY: ICD-10-CM

## 2020-11-30 DIAGNOSIS — M25.551 CHRONIC RIGHT HIP PAIN: ICD-10-CM

## 2020-11-30 PROCEDURE — PNV: Performed by: OBSTETRICS & GYNECOLOGY

## 2020-12-03 ENCOUNTER — NURSE TRIAGE (OUTPATIENT)
Dept: OTHER | Facility: OTHER | Age: 25
End: 2020-12-03

## 2020-12-03 DIAGNOSIS — Z20.828 SARS-ASSOCIATED CORONAVIRUS EXPOSURE: ICD-10-CM

## 2020-12-03 DIAGNOSIS — Z20.828 SARS-ASSOCIATED CORONAVIRUS EXPOSURE: Primary | ICD-10-CM

## 2020-12-03 PROCEDURE — U0003 INFECTIOUS AGENT DETECTION BY NUCLEIC ACID (DNA OR RNA); SEVERE ACUTE RESPIRATORY SYNDROME CORONAVIRUS 2 (SARS-COV-2) (CORONAVIRUS DISEASE [COVID-19]), AMPLIFIED PROBE TECHNIQUE, MAKING USE OF HIGH THROUGHPUT TECHNOLOGIES AS DESCRIBED BY CMS-2020-01-R: HCPCS | Performed by: FAMILY MEDICINE

## 2020-12-04 ENCOUNTER — TELEPHONE (OUTPATIENT)
Dept: OBGYN CLINIC | Facility: CLINIC | Age: 25
End: 2020-12-04

## 2020-12-04 DIAGNOSIS — U07.1 COVID-19 AFFECTING PREGNANCY IN THIRD TRIMESTER: Primary | ICD-10-CM

## 2020-12-04 DIAGNOSIS — O98.513 COVID-19 AFFECTING PREGNANCY IN THIRD TRIMESTER: Primary | ICD-10-CM

## 2020-12-04 LAB — SARS-COV-2 RNA SPEC QL NAA+PROBE: DETECTED

## 2020-12-05 ENCOUNTER — TELEPHONE (OUTPATIENT)
Dept: CARDIOLOGY CLINIC | Facility: CLINIC | Age: 25
End: 2020-12-05

## 2020-12-07 ENCOUNTER — TELEPHONE (OUTPATIENT)
Dept: OBGYN CLINIC | Facility: CLINIC | Age: 25
End: 2020-12-07

## 2020-12-09 ENCOUNTER — TELEPHONE (OUTPATIENT)
Dept: PERINATAL CARE | Facility: CLINIC | Age: 25
End: 2020-12-09

## 2020-12-09 PROBLEM — U07.1 COVID-19 AFFECTING PREGNANCY IN THIRD TRIMESTER: Status: ACTIVE | Noted: 2020-12-09

## 2020-12-09 PROBLEM — Z3A.32 32 WEEKS GESTATION OF PREGNANCY: Status: ACTIVE | Noted: 2020-08-26

## 2020-12-09 PROBLEM — O98.513 COVID-19 AFFECTING PREGNANCY IN THIRD TRIMESTER: Status: ACTIVE | Noted: 2020-12-09

## 2020-12-10 ENCOUNTER — TELEMEDICINE (OUTPATIENT)
Dept: PERINATAL CARE | Facility: CLINIC | Age: 25
End: 2020-12-10
Payer: COMMERCIAL

## 2020-12-10 DIAGNOSIS — U07.1 COVID-19 AFFECTING PREGNANCY IN THIRD TRIMESTER: Primary | ICD-10-CM

## 2020-12-10 DIAGNOSIS — Z3A.32 32 WEEKS GESTATION OF PREGNANCY: ICD-10-CM

## 2020-12-10 DIAGNOSIS — O98.513 COVID-19 AFFECTING PREGNANCY IN THIRD TRIMESTER: Primary | ICD-10-CM

## 2020-12-10 PROCEDURE — 99213 OFFICE O/P EST LOW 20 MIN: CPT | Performed by: OBSTETRICS & GYNECOLOGY

## 2020-12-17 ENCOUNTER — TELEMEDICINE (OUTPATIENT)
Dept: OBGYN CLINIC | Facility: CLINIC | Age: 25
End: 2020-12-17

## 2020-12-17 VITALS — BODY MASS INDEX: 23.3 KG/M2 | WEIGHT: 140 LBS

## 2020-12-17 DIAGNOSIS — U07.1 COVID-19 AFFECTING PREGNANCY IN THIRD TRIMESTER: Primary | ICD-10-CM

## 2020-12-17 DIAGNOSIS — M02.30 REACTIVE ARTHRITIS (HCC): ICD-10-CM

## 2020-12-17 DIAGNOSIS — O98.513 COVID-19 AFFECTING PREGNANCY IN THIRD TRIMESTER: Primary | ICD-10-CM

## 2020-12-17 DIAGNOSIS — Z3A.33 33 WEEKS GESTATION OF PREGNANCY: ICD-10-CM

## 2020-12-17 PROCEDURE — PNV: Performed by: OBSTETRICS & GYNECOLOGY

## 2020-12-29 ENCOUNTER — ROUTINE PRENATAL (OUTPATIENT)
Dept: OBGYN CLINIC | Facility: CLINIC | Age: 25
End: 2020-12-29

## 2020-12-29 VITALS — SYSTOLIC BLOOD PRESSURE: 124 MMHG | WEIGHT: 151.2 LBS | BODY MASS INDEX: 25.16 KG/M2 | DIASTOLIC BLOOD PRESSURE: 68 MMHG

## 2020-12-29 DIAGNOSIS — Z3A.35 35 WEEKS GESTATION OF PREGNANCY: Primary | ICD-10-CM

## 2020-12-29 PROCEDURE — PNV: Performed by: OBSTETRICS & GYNECOLOGY

## 2020-12-30 ENCOUNTER — TELEPHONE (OUTPATIENT)
Dept: PERINATAL CARE | Facility: CLINIC | Age: 25
End: 2020-12-30

## 2021-01-05 ENCOUNTER — TELEPHONE (OUTPATIENT)
Dept: PERINATAL CARE | Facility: CLINIC | Age: 26
End: 2021-01-05

## 2021-01-05 ENCOUNTER — ROUTINE PRENATAL (OUTPATIENT)
Dept: OBGYN CLINIC | Facility: CLINIC | Age: 26
End: 2021-01-05

## 2021-01-05 VITALS — SYSTOLIC BLOOD PRESSURE: 122 MMHG | DIASTOLIC BLOOD PRESSURE: 60 MMHG | WEIGHT: 154 LBS | BODY MASS INDEX: 25.63 KG/M2

## 2021-01-05 DIAGNOSIS — Z34.03 ENCOUNTER FOR SUPERVISION OF NORMAL FIRST PREGNANCY IN THIRD TRIMESTER: Primary | ICD-10-CM

## 2021-01-05 DIAGNOSIS — Z3A.36 36 WEEKS GESTATION OF PREGNANCY: ICD-10-CM

## 2021-01-05 PROCEDURE — PNV: Performed by: OBSTETRICS & GYNECOLOGY

## 2021-01-05 PROCEDURE — 87653 STREP B DNA AMP PROBE: CPT | Performed by: OBSTETRICS & GYNECOLOGY

## 2021-01-05 NOTE — PROGRESS NOTES
GBS to be done today  Wishes to have cervical check  Concerns: Notes increased pelvic, irregular contractions - denies LOF  Urine neg/neg

## 2021-01-05 NOTE — TELEPHONE ENCOUNTER
Spoke with patient and confirmed appointment with MFM  1 support person ( must be over age of 15) may accompany patient  Will you and your support person be able to wear a mask ,without a valve , during entire appointment? YES   To minimize your exposure in our waiting area,check in and rooming questions will be done via phone  When you arrive in the parking lot please call the following inside line # prior to entering office:    [place]: [phone]    Have you or your support person traveled outside the state in the last 2 weeks? NO  If yes, what state did you travel to? Do you or your support person have:  Fever or flu- like symptoms? NO  Symptoms of upper respiratory infection like runny nose, sore throat or cough? NO  Do you have new headache that you have not had in the past?NO  Have you experienced any new shortness of breath recently? NO  Do you have any new loss of taste or smell? NO  Do you have any new diarrhea, nausea or vomiting? NO  Have you recently been in contact with anyone who has been sick or diagnosed with COVID-19 infection? NO  Have you been recommended to quarantine because of an exposure to a confirmed positive COVID19 person? NO  Have you recently been tested for COVID19? NO    Patient verbalized understanding of all instructions

## 2021-01-05 NOTE — PROGRESS NOTES
26 y o    female at 42 2 wga EGA for PNV  BP : 122/60  TWlb    Patient presents for return OB visit  Feeling well  Reports irregular contractions and increased pelvic pressure  Denies vaginal bleeding and LOF  GBS collected today  No allergies to penicillin  SVE /-3, soft, midposition  Vertex presentation confirmed by abdominal US  Labor precautions reviewed  Follow up in 1 week

## 2021-01-06 ENCOUNTER — ULTRASOUND (OUTPATIENT)
Dept: PERINATAL CARE | Facility: CLINIC | Age: 26
End: 2021-01-06
Payer: COMMERCIAL

## 2021-01-06 ENCOUNTER — TELEPHONE (OUTPATIENT)
Dept: PERINATAL CARE | Facility: CLINIC | Age: 26
End: 2021-01-06

## 2021-01-06 VITALS
WEIGHT: 152.4 LBS | HEART RATE: 106 BPM | DIASTOLIC BLOOD PRESSURE: 63 MMHG | SYSTOLIC BLOOD PRESSURE: 135 MMHG | BODY MASS INDEX: 25.39 KG/M2 | HEIGHT: 65 IN

## 2021-01-06 DIAGNOSIS — Z3A.36 36 WEEKS GESTATION OF PREGNANCY: ICD-10-CM

## 2021-01-06 DIAGNOSIS — O36.5990 PREGNANCY AFFECTED BY FETAL GROWTH RESTRICTION: Primary | ICD-10-CM

## 2021-01-06 DIAGNOSIS — O98.513 COVID-19 AFFECTING PREGNANCY IN THIRD TRIMESTER: ICD-10-CM

## 2021-01-06 DIAGNOSIS — U07.1 COVID-19 AFFECTING PREGNANCY IN THIRD TRIMESTER: ICD-10-CM

## 2021-01-06 DIAGNOSIS — Z36.89 ENCOUNTER FOR ULTRASOUND TO ASSESS FETAL GROWTH: ICD-10-CM

## 2021-01-06 PROCEDURE — 76816 OB US FOLLOW-UP PER FETUS: CPT | Performed by: OBSTETRICS & GYNECOLOGY

## 2021-01-06 PROCEDURE — 59025 FETAL NON-STRESS TEST: CPT | Performed by: OBSTETRICS & GYNECOLOGY

## 2021-01-06 PROCEDURE — 76820 UMBILICAL ARTERY ECHO: CPT | Performed by: OBSTETRICS & GYNECOLOGY

## 2021-01-06 PROCEDURE — 99213 OFFICE O/P EST LOW 20 MIN: CPT | Performed by: OBSTETRICS & GYNECOLOGY

## 2021-01-06 NOTE — TELEPHONE ENCOUNTER
Message was left for patient to call back regarding anesthesiology consult  I do not see it that it was scheduled

## 2021-01-06 NOTE — LETTER
NST sleeve cover sheet    Patient name: Daphnie Hassan  : 1995  MRN: [id number]    GENA: Estimated Date of Delivery: 21    Obstetrician: [facility]) for testing:   IUGR  __________________________________________      Testing frequency:    __X_ 2x/wk  ___ 1x/wk  ___ Dopplers  ___ BPP?       Last growth scan: __________________________________________

## 2021-01-06 NOTE — PROGRESS NOTES
Non-Stress Testing:    Non-Stress test, equipment, procedure, and expected outcomes reviewed  Reviewed fetal kick counts and when to call OB  [facility] patient understanding of fetal kick counts with teach back method  Patient reports feeling daily fetal movements  Patient has no questions or concerns  Dr Parker Rossi viewed monitor tracing prior to completion of NST

## 2021-01-06 NOTE — PROGRESS NOTES
[address]: Ms Joe Martinez was seen today at [facility] for fetal growth assessment ultrasound  See ultrasound report under "OB Procedures" tab  New fetal growth restriction with normal doppler studies  Started antepartum fetal surveillance today      Please don't hesitate to contact our office with any concerns or questions   -Holly Gillespie MD

## 2021-01-06 NOTE — LETTER
January 6, 2021     Roz Jauregui, DO  [address]  [address] [facility]    Patient: Timmy Spence   YOB: 1995   Date of Visit: 1/6/2021       Dear Dr Mack Hanson:    Paco Fischer new fetal growth restriction with normal Dopplers today  Also arranging OB anesthesia consult  She told me someone told her something in her spine would likely rupture if she pushed  I know about the hip issues, not clear what she is referring to on my review of ortho notes  Just want to make sure we are clear for spinal at delivery  She wants to do once weekly testing in your office  Thanks,    Stefani Saenz MD        CC: No Recipients  Stefani Saenz MD  1/6/2021  6:44 PM  Sign when Signing Visit  [address] [place] [address] Road: Ms Basil Peterson was seen today at [facility] for fetal growth assessment ultrasound  See ultrasound report under "OB Procedures" tab  New fetal growth restriction with normal doppler studies  Started antepartum fetal surveillance today      Please don't hesitate to contact our office with any concerns or questions   -Stefani Saenz MD

## 2021-01-06 NOTE — PATIENT INSTRUCTIONS
Kick Counts in Pregnancy   AMBULATORY CARE:   Kick counts  measure how much your baby is moving in your womb  A kick from your baby can be felt as a twist, turn, swish, roll, or jab  It is common to feel your baby kicking at 26 to 28 weeks of pregnancy  You may feel your baby kick as early as 20 weeks of pregnancy  You may want to start counting at 28 weeks  Contact your healthcare provider immediately if:   · You feel a change in the number of kicks or movements of your baby  · You feel fewer than 10 kicks within 2 hours  · You have questions or concerns about your baby's movements  Why measure kick counts:  Your baby's movement may provide information about your baby's health  He or she may move less, or not at all, if there are problems  Your baby may move less if he or she is not getting enough oxygen or nutrition from the placenta  Do not smoke while you are pregnant  Smoking decreases the amount of oxygen that gets to your baby  Talk to your healthcare provider if you need help to quit smoking  Tell your healthcare provider as soon as you feel a change in your baby's movements  When to measure kick counts:   · Measure kick counts at the same time every day  · Measure kick counts when your baby is awake and most active  Your baby may be most active in the evening  How to measure kick counts:  Check that your baby is awake before you measure kick counts  You can wake up your baby by lightly pushing on your belly, walking, or drinking something cold  Your healthcare provider may tell you different ways to measure kick counts  You may be told to do the following:  · Use a chart or clock to keep track of the time you start and finish counting  · Sit in a chair or lie on your left side  · Place your hands on the largest part of your belly  · Count until you reach 10 kicks  Write down how much time it takes to count 10 kicks  · It may take 30 minutes to 2 hours to count 10 kicks  It should not take more than 2 hours to count 10 kicks  Follow up with your healthcare provider as directed:  Write down your questions so you remember to ask them during your visits  © Copyright [address] Information is for End User's use only and may not be sold, redistributed or otherwise used for commercial purposes  All illustrations and images included in CareNotes® are the copyrighted property of A D A M , Inc  or [facility] Konstantin Chatterjee   The above information is an  only  It is not intended as medical advice for individual conditions or treatments  Talk to your doctor, nurse or pharmacist before following any medical regimen to see if it is safe and effective for you  Nonstress Test for Pregnancy   WHAT YOU NEED TO KNOW:   What do I need to know about a nonstress test?  A nonstress test measures your baby's heart rate and movements  Nonstress means that no stress will be placed on your baby during the test    How do I prepare for a nonstress test?  Your healthcare provider will talk to you about how to prepare for this test  He may tell you to eat and drink plenty of fluids before your test  If you smoke, you may be asked not to smoke within 2 hours before the test  He will also tell you what medicines to take or not take on the day of your test    What will happen during a nonstress test?  You may be asked to lie down or recline back for the test on a bed  One or two belts with sensors will be placed around your abdomen  Your baby's heart rate will be recorded with a machine  If your baby does not move, your baby may be asleep  Your healthcare provider may make a noise near your abdomen to try to wake your baby  The test usually takes about 20 minutes, but can take longer if your baby needs to be awakened  What do I need to know about the test results? Your baby will be expected to move at least twice for a certain amount of time   Your baby's heart rate will be expected to go up by a certain number of beats per minute during movement  If your baby does not move as expected, the test may need to be repeated or you may need other tests  CARE AGREEMENT:   You have the right to help plan your care  Learn about your health condition and how it may be treated  Discuss treatment options with your healthcare providers to decide what care you want to receive  You always have the right to refuse treatment  The above information is an  only  It is not intended as medical advice for individual conditions or treatments  Talk to your doctor, nurse or pharmacist before following any medical regimen to see if it is safe and effective for you  © Copyright [address] Information is for End User's use only and may not be sold, redistributed or otherwise used for commercial purposes   All illustrations and images included in CareNotes® are the copyrighted property of A D A M , Inc  or [address] [place] [address]

## 2021-01-07 LAB — GP B STREP DNA SPEC QL NAA+PROBE: NORMAL

## 2021-01-11 ENCOUNTER — ROUTINE PRENATAL (OUTPATIENT)
Dept: OBGYN CLINIC | Facility: CLINIC | Age: 26
End: 2021-01-11
Payer: COMMERCIAL

## 2021-01-11 VITALS — WEIGHT: 154 LBS | DIASTOLIC BLOOD PRESSURE: 72 MMHG | BODY MASS INDEX: 25.63 KG/M2 | SYSTOLIC BLOOD PRESSURE: 128 MMHG

## 2021-01-11 DIAGNOSIS — O36.5990 PREGNANCY AFFECTED BY FETAL GROWTH RESTRICTION: ICD-10-CM

## 2021-01-11 DIAGNOSIS — Z34.03 ENCOUNTER FOR SUPERVISION OF NORMAL FIRST PREGNANCY IN THIRD TRIMESTER: Primary | ICD-10-CM

## 2021-01-11 DIAGNOSIS — M16.11 PRIMARY OSTEOARTHRITIS OF RIGHT HIP: ICD-10-CM

## 2021-01-11 DIAGNOSIS — Z3A.37 37 WEEKS GESTATION OF PREGNANCY: ICD-10-CM

## 2021-01-11 PROCEDURE — PNV: Performed by: OBSTETRICS & GYNECOLOGY

## 2021-01-11 PROCEDURE — 59025 FETAL NON-STRESS TEST: CPT | Performed by: OBSTETRICS & GYNECOLOGY

## 2021-01-11 NOTE — PROGRESS NOTES
26 y o    female at 37w0d EGA for PNV  BP : 128/72  TWlbs   scheduled for  Primary  at 39 week and 1 day  Discussed diagnosis of IUGR  Discussed weekly monitoring  Will do NST today  Patient having some irregular contractions  Denies leakage of fluid or vaginal bleeding  Reports good fetal movement

## 2021-01-11 NOTE — PROGRESS NOTES
Pt is scheduled for her NST with SURINDER on Thursday  She also has some school forms she wants you to look at

## 2021-01-13 ENCOUNTER — TELEPHONE (OUTPATIENT)
Dept: PERINATAL CARE | Facility: OTHER | Age: 26
End: 2021-01-13

## 2021-01-13 NOTE — TELEPHONE ENCOUNTER
Attempted to reach patient by phone and left voicemail to confirm appointment for MFM ultrasound  1 support person ( must be over the age of 15) may accompany you for your appointment  If you or your support person have traveled outside the state in the past 2 weeks, please call and notify our office today #[phone]  You and your support person must wear a mask ,covering nose and mouth,during your entire visit  To minimize your exposure in our waiting room, please call our office prior to entering the building  Check in and rooming questions will be done via phone  We will give you directions when to enter for your appointment  [place]: [phone]    IF you are not feeling well- cough, fever, shortness of breath or any flu like symptoms, contact your primary care physician or -[phone]  If you are awaiting COVID 19 test results please call and reschedule your appointment    Any questions with these instructions please call Maternal Fetal Medicine nurse line today @ # [phone]

## 2021-01-14 ENCOUNTER — ROUTINE PRENATAL (OUTPATIENT)
Dept: PERINATAL CARE | Facility: CLINIC | Age: 26
End: 2021-01-14
Payer: COMMERCIAL

## 2021-01-14 VITALS
HEIGHT: 65 IN | DIASTOLIC BLOOD PRESSURE: 78 MMHG | HEART RATE: 96 BPM | BODY MASS INDEX: 25.63 KG/M2 | SYSTOLIC BLOOD PRESSURE: 128 MMHG

## 2021-01-14 DIAGNOSIS — Z3A.37 37 WEEKS GESTATION OF PREGNANCY: Primary | ICD-10-CM

## 2021-01-14 DIAGNOSIS — O36.5990 PREGNANCY AFFECTED BY FETAL GROWTH RESTRICTION: ICD-10-CM

## 2021-01-14 PROCEDURE — 59025 FETAL NON-STRESS TEST: CPT | Performed by: OBSTETRICS & GYNECOLOGY

## 2021-01-14 NOTE — PATIENT INSTRUCTIONS
Kick Counts in Pregnancy   AMBULATORY CARE:   Kick counts  measure how much your baby is moving in your womb  A kick from your baby can be felt as a twist, turn, swish, roll, or jab  It is common to feel your baby kicking at 26 to 28 weeks of pregnancy  You may feel your baby kick as early as 20 weeks of pregnancy  You may want to start counting at 28 weeks  Contact your healthcare provider immediately if:   · You feel a change in the number of kicks or movements of your baby  · You feel fewer than 10 kicks within 2 hours  · You have questions or concerns about your baby's movements  Why measure kick counts:  Your baby's movement may provide information about your baby's health  He or she may move less, or not at all, if there are problems  Your baby may move less if he or she is not getting enough oxygen or nutrition from the placenta  Do not smoke while you are pregnant  Smoking decreases the amount of oxygen that gets to your baby  Talk to your healthcare provider if you need help to quit smoking  Tell your healthcare provider as soon as you feel a change in your baby's movements  When to measure kick counts:   · Measure kick counts at the same time every day  · Measure kick counts when your baby is awake and most active  Your baby may be most active in the evening  How to measure kick counts:  Check that your baby is awake before you measure kick counts  You can wake up your baby by lightly pushing on your belly, walking, or drinking something cold  Your healthcare provider may tell you different ways to measure kick counts  You may be told to do the following:  · Use a chart or clock to keep track of the time you start and finish counting  · Sit in a chair or lie on your left side  · Place your hands on the largest part of your belly  · Count until you reach 10 kicks  Write down how much time it takes to count 10 kicks  · It may take 30 minutes to 2 hours to count 10 kicks  It should not take more than 2 hours to count 10 kicks  Follow up with your healthcare provider as directed:  Write down your questions so you remember to ask them during your visits  © Copyright [address] Information is for End User's use only and may not be sold, redistributed or otherwise used for commercial purposes  All illustrations and images included in CareNotes® are the copyrighted property of A D A M , Inc  or [facility] Konstantin Chatterjee   The above information is an  only  It is not intended as medical advice for individual conditions or treatments  Talk to your doctor, nurse or pharmacist before following any medical regimen to see if it is safe and effective for you

## 2021-01-14 NOTE — PROGRESS NOTES
NST at  37 3/7 weeks    Indication  Fetal growth restriction    Interpretation  Reactive    Plan  2 x wk NST      Daily fetal movement counts    Omar Wyman MD

## 2021-01-19 ENCOUNTER — TELEPHONE (OUTPATIENT)
Dept: PERINATAL CARE | Facility: CLINIC | Age: 26
End: 2021-01-19

## 2021-01-19 NOTE — TELEPHONE ENCOUNTER
Called patient to confirm Maternal Fetal Medicine Virtual appointment scheduled for 1/20/21  10:30  BRENNON  Confirmed she received e-mail and has successfully downloaded the [address]  Explained procedure for virtual visit and requested she log into appointment approximately 10 minutes prior to scheduled start time  Explained the [facility] [address]  will join her at the scheduled appointment time  Patient instructed to call [facility] office @ #[phone] for technical support issues using [employer] TEAMS  Patient verbalized understanding and has no questions at time

## 2021-01-20 ENCOUNTER — CLINICAL SUPPORT (OUTPATIENT)
Dept: PERINATAL CARE | Facility: CLINIC | Age: 26
End: 2021-01-20

## 2021-01-20 ENCOUNTER — ROUTINE PRENATAL (OUTPATIENT)
Dept: OBGYN CLINIC | Facility: CLINIC | Age: 26
End: 2021-01-20
Payer: COMMERCIAL

## 2021-01-20 VITALS
BODY MASS INDEX: 25.89 KG/M2 | WEIGHT: 155.6 LBS | DIASTOLIC BLOOD PRESSURE: 82 MMHG | SYSTOLIC BLOOD PRESSURE: 124 MMHG | HEART RATE: 69 BPM

## 2021-01-20 DIAGNOSIS — Z87.39 HISTORY OF SCOLIOSIS: ICD-10-CM

## 2021-01-20 DIAGNOSIS — O36.5990 PREGNANCY AFFECTED BY FETAL GROWTH RESTRICTION: Primary | ICD-10-CM

## 2021-01-20 DIAGNOSIS — Z3A.38 38 WEEKS GESTATION OF PREGNANCY: ICD-10-CM

## 2021-01-20 DIAGNOSIS — Z36.9 ENCOUNTER FOR ANTENATAL SCREENING OF MOTHER: Primary | ICD-10-CM

## 2021-01-20 PROCEDURE — PNV: Performed by: OBSTETRICS & GYNECOLOGY

## 2021-01-20 PROCEDURE — 59025 FETAL NON-STRESS TEST: CPT | Performed by: OBSTETRICS & GYNECOLOGY

## 2021-01-20 NOTE — PROGRESS NOTES
Virtual Regular Visit      Assessment/Plan:  Rao Garcia is a candidate for spinal anesthesia for her  delivery  I explained that there may be some difficulty with placement due to her back surgery and arthritis however most likely spinal anesthesia should proceed uncomplicated  General anesthesia is always a backup plan  Problem List Items Addressed This Visit     None      Visit Diagnoses     History of scoliosis                   Reason for visit is No chief complaint on file  Encounter provider Samson Grullon MD    Provider located at [address] [place] [id number] [phone]      Recent Visits  Date Type Provider Dept   21 Telephone Ria Mckeon, [facility]     Showing recent visits within past 7 days and meeting all other requirements     Future Appointments  No visits were found meeting these conditions  Showing future appointments within next 150 days and meeting all other requirements        The patient was identified by name and date of birth  Daphnie Hassan was informed that this is a telemedicine visit and that the visit is being conducted through [employer]  She acknowledged consent and understanding of privacy and security of the video platform  The patient has agreed to participate and understands they can discontinue the visit at any time  Patient is aware this is a billable service  Subjective  Daphnie Hassan is a 26 y o  female  at 45 2/7 weeks gestation presents to discuss anesthesia options for her upcoming scheduled  delivery  She has a history of scoliosis which was corrected with spinal fusion and Smith rods in , she believes she is fused from T1 to pelvis  She has been largely asymptomatic since her surgery except for some numbness in her shoulder blades bilaterally which has been present since her surgery and has not changed   Additionally 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 and is followed by rheumatology and orthopedics  She is maintained on sulfasalazine and her condition has been stable  She will be having a  delivery for these issues  She had Covid at the beginning of December with mild symptoms of loss of smell and taste and general flu-like symptoms  She recovered at home and did not require hospital admission or any advanced therapies  She denies any pulmonary issues since her symptoms resolved several weeks ago  HPI   See above    Past Medical History:   Diagnosis Date    Arthritis     right hip    History of transfusion     unsure - could have been with surgery on back    Knee effusion, left     Need for HPV vaccination 2012    completed series    Scoliosis     Varicella     vaccine    Varicella vaccination        Past Surgical History:   Procedure Laterality Date    HIP SURGERY Right     removal of benign bony growth    SPINAL FUSION         Current Outpatient Medications   Medication Sig Dispense Refill    Prenatal MV & Min w/FA-DHA (PRENATAL ADULT GUMMY/DHA/FA) 0 4-25 MG CHEW Chew 2 tablets daily      sulfaSALAzine (AZULFIDINE) 500 mg tablet Take 2 tablets (1,000 mg total) by mouth 2 (two) times a day Total of 4 tabs in a day 120 tablet 6     No current facility-administered medications for this visit  No Known Allergies    Review of Systems   Constitutional: Negative  HENT: Negative  Eyes: Negative  Respiratory: Negative  Cardiovascular: Negative  Gastrointestinal: Negative  Endocrine: Negative  Genitourinary: Negative  Musculoskeletal: Negative  Skin: Negative  Allergic/Immunologic: Negative  Neurological: Negative  Hematological: Negative  Psychiatric/Behavioral: Negative  All other systems reviewed and are negative  Video Exam    There were no vitals filed for this visit  Physical Exam  Nursing note reviewed  Constitutional:       Appearance: Normal appearance  HENT:      Head: Normocephalic and atraumatic  Neck:      Musculoskeletal: Normal range of motion  Pulmonary:      Effort: Pulmonary effort is normal    Abdominal:      Comments: Gravid abdomen   Musculoskeletal:      Comments: Some limitation in back flexion and extension, otherwise normal, denies pain   Neurological:      Mental Status: She is alert and oriented to person, place, and time  Mental status is at baseline  Comments: Report LOS over shoulder blades bilaterally              VIRTUAL VISIT Carter [address] acknowledges that she has consented to an online visit or consultation  She understands that the online visit is based solely on information provided by her, and that, in the absence of a face-to-face physical evaluation by the physician, the diagnosis she receives is both limited and provisional in terms of accuracy and completeness  This is not intended to replace a full medical face-to-face evaluation by the physician  Digna Saunders understands and accepts these terms

## 2021-01-20 NOTE — PROGRESS NOTES
26 y o    female at 37 4 wga EGA for PNV  BP : 124/82  TW  Patient is feeling well  She has no complaints  Reviewed labor precautions and fetal kick counts  Reviewed process  section-Hibiclens given  Symmetrical IUGR - reactive NST,  NST and HEIDI on Thursday  Primary  scheduled for  at 8:00 a m

## 2021-01-21 ENCOUNTER — TELEPHONE (OUTPATIENT)
Dept: PERINATAL CARE | Facility: CLINIC | Age: 26
End: 2021-01-21

## 2021-01-21 NOTE — TELEPHONE ENCOUNTER
Attempted to reach patient by phone and left voicemail to confirm appointment for MFM ultrasound  1 support person ( must be over the age of 15) may accompany you for your appointment  Please wear masks ( [place] [facility])  You and your support person will be screened upon arrival   IF not feeling well- cough, fever, shortness of breath or any flu like symptoms contact your primary care physician or [phone]  ? Please call our office prior to entering the building  Check in and rooming questions will be done via phone  Inside office # provided:  ? [place] line: [phone]    ? [facility] does not allow cell phone use, recording device or streaming during ultrasound     Any questions with these instructions please call Maternal Fetal Medicine nurse line today @ # [phone]

## 2021-01-22 ENCOUNTER — TELEPHONE (OUTPATIENT)
Dept: PERINATAL CARE | Facility: CLINIC | Age: 26
End: 2021-01-22

## 2021-01-22 ENCOUNTER — ULTRASOUND (OUTPATIENT)
Dept: PERINATAL CARE | Facility: CLINIC | Age: 26
End: 2021-01-22
Payer: COMMERCIAL

## 2021-01-22 VITALS
HEART RATE: 90 BPM | WEIGHT: 156.2 LBS | SYSTOLIC BLOOD PRESSURE: 120 MMHG | DIASTOLIC BLOOD PRESSURE: 74 MMHG | BODY MASS INDEX: 25.99 KG/M2

## 2021-01-22 DIAGNOSIS — Z3A.38 38 WEEKS GESTATION OF PREGNANCY: Primary | ICD-10-CM

## 2021-01-22 DIAGNOSIS — M25.551 CHRONIC RIGHT HIP PAIN: ICD-10-CM

## 2021-01-22 DIAGNOSIS — O98.513 COVID-19 AFFECTING PREGNANCY IN THIRD TRIMESTER: ICD-10-CM

## 2021-01-22 DIAGNOSIS — G89.29 CHRONIC RIGHT HIP PAIN: ICD-10-CM

## 2021-01-22 DIAGNOSIS — U07.1 COVID-19 AFFECTING PREGNANCY IN THIRD TRIMESTER: ICD-10-CM

## 2021-01-22 DIAGNOSIS — O36.5990 PREGNANCY AFFECTED BY FETAL GROWTH RESTRICTION: ICD-10-CM

## 2021-01-22 PROCEDURE — 76820 UMBILICAL ARTERY ECHO: CPT | Performed by: OBSTETRICS & GYNECOLOGY

## 2021-01-22 PROCEDURE — 76815 OB US LIMITED FETUS(S): CPT | Performed by: OBSTETRICS & GYNECOLOGY

## 2021-01-22 PROCEDURE — 59025 FETAL NON-STRESS TEST: CPT | Performed by: OBSTETRICS & GYNECOLOGY

## 2021-01-22 NOTE — LETTER
January 23, 2021     Kristin Gutierrez, [address] [facility]  [address]  [place] [address]    Patient: Bernice Gomez   YOB: 1995   Date of Visit: 1/22/2021       Dear Dr Riccardo Ortiz:    Thank you for referring Arely Fletcher to me for evaluation  Below are my notes for this consultation  If you have questions, please do not hesitate to call me  I look forward to following your patient along with you  Sincerely,        Rosalba Toledo RN        CC: No Recipients  Skyler Ugarte MD  1/23/2021  3:52 PM  Sign when Signing Visit  Ob ultrasound and NST    Fetal ultrasound examination for evaluation of HEIDI and NST at  38 4/7 weeks gestation  Hx of    Maternal Covid 19 on 12-03-20 ; fetal growth restriction    She is asymptomatic  See Ob procedures in EPIC  1  HEIDI  wnl 12 4  2  NST  Reactive  3  Normal umbilical cord Doppler with adequate diastolic flow  Recommendations; 1  Twice a week NSTs, once a week HEIDI  2  Daily fetal movement counts  3  Delivery is recommended between 38 and 39 weeks gestation    Thank you for referring your patient to our offices  If you have any further questions do not hesitate to contact us as [phone]      Skyler Ugarte MD

## 2021-01-22 NOTE — PROGRESS NOTES
Repeat Non-Stress Testing:    Pt verbalizes +FM  Pt denies ALL:               Leaking of fluid   Contractions   Vaginal bleeding   Decreased fetal movement    Patient has no questions or concerns  DR Renetta Mcgee viewed monitor tracing prior to completion of NST

## 2021-01-22 NOTE — TELEPHONE ENCOUNTER
Scheduled for 1:45 NST and 3:15 ultrasound  Left message on machine to see if patient would like to come in at 2:30 for NST so that she is not waiting as long between the 2 tests

## 2021-01-23 NOTE — PROGRESS NOTES
Ob ultrasound and NST    Fetal ultrasound examination for evaluation of HEIDI and NST at  38 4/7 weeks gestation  Hx of    Maternal Covid 19 on 12-03-20 ; fetal growth restriction    She is asymptomatic  See Ob procedures in EPIC  1  HEIDI  wnl 12 4  2  NST  Reactive  3  Normal umbilical cord Doppler with adequate diastolic flow  Recommendations; 1  Twice a week NSTs, once a week HEIDI  2  Daily fetal movement counts  3  Delivery is recommended between 38 and 39 weeks gestation    Thank you for referring your patient to our offices  If you have any further questions do not hesitate to contact us as [phone]      Milli Rojas MD

## 2021-01-24 ENCOUNTER — TELEPHONE (OUTPATIENT)
Dept: OTHER | Facility: OTHER | Age: 26
End: 2021-01-24

## 2021-01-24 ENCOUNTER — ANESTHESIA (INPATIENT)
Dept: LABOR AND DELIVERY | Facility: HOSPITAL | Age: 26
End: 2021-01-24
Payer: COMMERCIAL

## 2021-01-24 ENCOUNTER — ANESTHESIA EVENT (INPATIENT)
Dept: LABOR AND DELIVERY | Facility: HOSPITAL | Age: 26
End: 2021-01-24
Payer: COMMERCIAL

## 2021-01-24 ENCOUNTER — HOSPITAL ENCOUNTER (INPATIENT)
Facility: HOSPITAL | Age: 26
LOS: 2 days | Discharge: HOME | End: 2021-01-26
Attending: OBSTETRICS & GYNECOLOGY | Admitting: OBSTETRICS & GYNECOLOGY
Payer: COMMERCIAL

## 2021-01-24 VITALS — HEART RATE: 86 BPM

## 2021-01-24 DIAGNOSIS — O60.03 PREMATURE LABOR IN THIRD TRIMESTER: ICD-10-CM

## 2021-01-24 DIAGNOSIS — M16.9 HIP OSTEOARTHRITIS: ICD-10-CM

## 2021-01-24 DIAGNOSIS — Z98.891 S/P CESAREAN SECTION: ICD-10-CM

## 2021-01-24 DIAGNOSIS — Z3A.38 38 WEEKS GESTATION OF PREGNANCY: Primary | ICD-10-CM

## 2021-01-24 LAB
ABO GROUP BLD: NORMAL
AMPHETAMINES SERPL QL SCN: NEGATIVE
BARBITURATES UR QL: NEGATIVE
BASE EXCESS BLDCOA CALC-SCNC: -0.8 MMOL/L (ref 3–11)
BASE EXCESS BLDCOV CALC-SCNC: 0.6 MMOL/L (ref 1–9)
BENZODIAZ UR QL: NEGATIVE
BLD GP AB SCN SERPL QL: NEGATIVE
COCAINE UR QL: NEGATIVE
ERYTHROCYTE [DISTWIDTH] IN BLOOD BY AUTOMATED COUNT: 15 % (ref 11.6–15.1)
HCO3 BLDCOA-SCNC: 26.1 MMOL/L (ref 17.3–27.3)
HCO3 BLDCOV-SCNC: 27.4 MMOL/L (ref 12.2–28.6)
HCT VFR BLD AUTO: 35.7 % (ref 34.8–46.1)
HGB BLD-MCNC: 11.5 G/DL (ref 11.5–15.4)
HOLD SPECIMEN: NORMAL
MCH RBC QN AUTO: 28.2 PG (ref 26.8–34.3)
MCHC RBC AUTO-ENTMCNC: 32.2 G/DL (ref 31.4–37.4)
MCV RBC AUTO: 88 FL (ref 82–98)
METHADONE UR QL: NEGATIVE
O2 CT VFR BLDCOA CALC: 9.1 ML/DL
OPIATES UR QL SCN: NEGATIVE
OXYCODONE+OXYMORPHONE UR QL SCN: NEGATIVE
OXYHGB MFR BLDCOA: 45.6 %
OXYHGB MFR BLDCOV: 63.7 %
PCO2 BLDCOA: 52 MM[HG] (ref 30–60)
PCO2 BLDCOV: 52.8 MM HG (ref 27–43)
PCP UR QL: NEGATIVE
PH BLDCOA: 7.32 [PH] (ref 7.23–7.43)
PH BLDCOV: 7.33 [PH] (ref 7.19–7.49)
PLATELET # BLD AUTO: 333 THOUSANDS/UL (ref 149–390)
PMV BLD AUTO: 10.5 FL (ref 8.9–12.7)
PO2 BLDCOA: 20.2 MM HG (ref 5–25)
PO2 BLDCOV: 26.6 MM HG (ref 15–45)
RBC # BLD AUTO: 4.08 MILLION/UL (ref 3.81–5.12)
RH BLD: POSITIVE
SAO2 % BLDCOV: 12.6 ML/DL
SPECIMEN EXPIRATION DATE: NORMAL
THC UR QL: NEGATIVE
WBC # BLD AUTO: 17.6 THOUSAND/UL (ref 4.31–10.16)

## 2021-01-24 PROCEDURE — 86901 BLOOD TYPING SEROLOGIC RH(D): CPT | Performed by: OBSTETRICS & GYNECOLOGY

## 2021-01-24 PROCEDURE — 86850 RBC ANTIBODY SCREEN: CPT | Performed by: OBSTETRICS & GYNECOLOGY

## 2021-01-24 PROCEDURE — C9290 INJ, BUPIVACAINE LIPOSOME: HCPCS | Performed by: ANESTHESIOLOGY

## 2021-01-24 PROCEDURE — 86592 SYPHILIS TEST NON-TREP QUAL: CPT | Performed by: OBSTETRICS & GYNECOLOGY

## 2021-01-24 PROCEDURE — 82805 BLOOD GASES W/O2 SATURATION: CPT | Performed by: OBSTETRICS & GYNECOLOGY

## 2021-01-24 PROCEDURE — 99214 OFFICE O/P EST MOD 30 MIN: CPT

## 2021-01-24 PROCEDURE — 4A1HXCZ MONITORING OF PRODUCTS OF CONCEPTION, CARDIAC RATE, EXTERNAL APPROACH: ICD-10-PCS | Performed by: OBSTETRICS & GYNECOLOGY

## 2021-01-24 PROCEDURE — 85027 COMPLETE CBC AUTOMATED: CPT | Performed by: OBSTETRICS & GYNECOLOGY

## 2021-01-24 PROCEDURE — 80307 DRUG TEST PRSMV CHEM ANLYZR: CPT | Performed by: OBSTETRICS & GYNECOLOGY

## 2021-01-24 PROCEDURE — 86900 BLOOD TYPING SEROLOGIC ABO: CPT | Performed by: OBSTETRICS & GYNECOLOGY

## 2021-01-24 PROCEDURE — 59510 CESAREAN DELIVERY: CPT | Performed by: OBSTETRICS & GYNECOLOGY

## 2021-01-24 PROCEDURE — NC001 PR NO CHARGE: Performed by: OBSTETRICS & GYNECOLOGY

## 2021-01-24 RX ORDER — CEFAZOLIN SODIUM 1 G/50ML
1000 SOLUTION INTRAVENOUS ONCE
Status: COMPLETED | OUTPATIENT
Start: 2021-01-24 | End: 2021-01-24

## 2021-01-24 RX ORDER — OXYTOCIN/RINGER'S LACTATE 30/500 ML
62.5 PLASTIC BAG, INJECTION (ML) INTRAVENOUS CONTINUOUS
Status: DISPENSED | OUTPATIENT
Start: 2021-01-24 | End: 2021-01-24

## 2021-01-24 RX ORDER — CALCIUM CARBONATE 200(500)MG
1000 TABLET,CHEWABLE ORAL DAILY PRN
Status: DISCONTINUED | OUTPATIENT
Start: 2021-01-24 | End: 2021-01-26 | Stop reason: HOSPADM

## 2021-01-24 RX ORDER — FENTANYL CITRATE/PF 50 MCG/ML
25 SYRINGE (ML) INJECTION
Status: COMPLETED | OUTPATIENT
Start: 2021-01-24 | End: 2021-01-24

## 2021-01-24 RX ORDER — LIDOCAINE HYDROCHLORIDE 10 MG/ML
INJECTION, SOLUTION EPIDURAL; INFILTRATION; INTRACAUDAL; PERINEURAL AS NEEDED
Status: DISCONTINUED | OUTPATIENT
Start: 2021-01-24 | End: 2021-01-24

## 2021-01-24 RX ORDER — OXYTOCIN/RINGER'S LACTATE 30/500 ML
PLASTIC BAG, INJECTION (ML) INTRAVENOUS CONTINUOUS PRN
Status: DISCONTINUED | OUTPATIENT
Start: 2021-01-24 | End: 2021-01-24

## 2021-01-24 RX ORDER — DIPHENHYDRAMINE HCL 25 MG
25 TABLET ORAL EVERY 6 HOURS PRN
Status: DISCONTINUED | OUTPATIENT
Start: 2021-01-24 | End: 2021-01-26 | Stop reason: HOSPADM

## 2021-01-24 RX ORDER — PROPOFOL 10 MG/ML
INJECTION, EMULSION INTRAVENOUS AS NEEDED
Status: DISCONTINUED | OUTPATIENT
Start: 2021-01-24 | End: 2021-01-24

## 2021-01-24 RX ORDER — METOCLOPRAMIDE HYDROCHLORIDE 5 MG/ML
10 INJECTION INTRAMUSCULAR; INTRAVENOUS ONCE AS NEEDED
Status: DISCONTINUED | OUTPATIENT
Start: 2021-01-24 | End: 2021-01-26 | Stop reason: HOSPADM

## 2021-01-24 RX ORDER — ONDANSETRON 2 MG/ML
INJECTION INTRAMUSCULAR; INTRAVENOUS AS NEEDED
Status: DISCONTINUED | OUTPATIENT
Start: 2021-01-24 | End: 2021-01-24

## 2021-01-24 RX ORDER — HYDROMORPHONE HCL/PF 1 MG/ML
0.5 SYRINGE (ML) INJECTION
Status: DISCONTINUED | OUTPATIENT
Start: 2021-01-24 | End: 2021-01-26 | Stop reason: HOSPADM

## 2021-01-24 RX ORDER — FENTANYL CITRATE 50 UG/ML
INJECTION, SOLUTION INTRAMUSCULAR; INTRAVENOUS AS NEEDED
Status: DISCONTINUED | OUTPATIENT
Start: 2021-01-24 | End: 2021-01-24

## 2021-01-24 RX ORDER — BUPIVACAINE HYDROCHLORIDE 5 MG/ML
INJECTION, SOLUTION PERINEURAL
Status: DISCONTINUED | OUTPATIENT
Start: 2021-01-24 | End: 2021-01-24

## 2021-01-24 RX ORDER — ACETAMINOPHEN 325 MG/1
650 TABLET ORAL EVERY 4 HOURS
Status: DISCONTINUED | OUTPATIENT
Start: 2021-01-24 | End: 2021-01-26 | Stop reason: HOSPADM

## 2021-01-24 RX ORDER — IBUPROFEN 600 MG/1
600 TABLET ORAL EVERY 6 HOURS SCHEDULED
Status: CANCELLED | OUTPATIENT
Start: 2021-01-24

## 2021-01-24 RX ORDER — HYDROMORPHONE HCL/PF 1 MG/ML
SYRINGE (ML) INJECTION AS NEEDED
Status: DISCONTINUED | OUTPATIENT
Start: 2021-01-24 | End: 2021-01-24

## 2021-01-24 RX ORDER — ONDANSETRON 2 MG/ML
4 INJECTION INTRAMUSCULAR; INTRAVENOUS EVERY 8 HOURS PRN
Status: DISCONTINUED | OUTPATIENT
Start: 2021-01-24 | End: 2021-01-24

## 2021-01-24 RX ORDER — DOCUSATE SODIUM 100 MG/1
100 CAPSULE, LIQUID FILLED ORAL 2 TIMES DAILY
Status: DISCONTINUED | OUTPATIENT
Start: 2021-01-24 | End: 2021-01-26 | Stop reason: HOSPADM

## 2021-01-24 RX ORDER — KETOROLAC TROMETHAMINE 30 MG/ML
INJECTION, SOLUTION INTRAMUSCULAR; INTRAVENOUS AS NEEDED
Status: DISCONTINUED | OUTPATIENT
Start: 2021-01-24 | End: 2021-01-24

## 2021-01-24 RX ORDER — HYDROMORPHONE HCL/PF 1 MG/ML
1 SYRINGE (ML) INJECTION EVERY 4 HOURS PRN
Status: DISCONTINUED | OUTPATIENT
Start: 2021-01-24 | End: 2021-01-26 | Stop reason: HOSPADM

## 2021-01-24 RX ORDER — ONDANSETRON 2 MG/ML
4 INJECTION INTRAMUSCULAR; INTRAVENOUS EVERY 8 HOURS PRN
Status: DISCONTINUED | OUTPATIENT
Start: 2021-01-24 | End: 2021-01-26 | Stop reason: HOSPADM

## 2021-01-24 RX ORDER — ONDANSETRON 2 MG/ML
4 INJECTION INTRAMUSCULAR; INTRAVENOUS ONCE AS NEEDED
Status: DISCONTINUED | OUTPATIENT
Start: 2021-01-24 | End: 2021-01-26 | Stop reason: HOSPADM

## 2021-01-24 RX ORDER — KETOROLAC TROMETHAMINE 30 MG/ML
30 INJECTION, SOLUTION INTRAMUSCULAR; INTRAVENOUS EVERY 6 HOURS SCHEDULED
Status: COMPLETED | OUTPATIENT
Start: 2021-01-24 | End: 2021-01-25

## 2021-01-24 RX ORDER — DIAPER,BRIEF,INFANT-TODD,DISP
1 EACH MISCELLANEOUS DAILY PRN
Status: DISCONTINUED | OUTPATIENT
Start: 2021-01-24 | End: 2021-01-26 | Stop reason: HOSPADM

## 2021-01-24 RX ORDER — SUCCINYLCHOLINE/SOD CL,ISO/PF 100 MG/5ML
SYRINGE (ML) INTRAVENOUS AS NEEDED
Status: DISCONTINUED | OUTPATIENT
Start: 2021-01-24 | End: 2021-01-24

## 2021-01-24 RX ORDER — DEXAMETHASONE SODIUM PHOSPHATE 4 MG/ML
INJECTION, SOLUTION INTRA-ARTICULAR; INTRALESIONAL; INTRAMUSCULAR; INTRAVENOUS; SOFT TISSUE AS NEEDED
Status: DISCONTINUED | OUTPATIENT
Start: 2021-01-24 | End: 2021-01-24

## 2021-01-24 RX ORDER — TRISODIUM CITRATE DIHYDRATE AND CITRIC ACID MONOHYDRATE 500; 334 MG/5ML; MG/5ML
30 SOLUTION ORAL ONCE
Status: DISCONTINUED | OUTPATIENT
Start: 2021-01-24 | End: 2021-01-24

## 2021-01-24 RX ORDER — SODIUM CHLORIDE, SODIUM LACTATE, POTASSIUM CHLORIDE, CALCIUM CHLORIDE 600; 310; 30; 20 MG/100ML; MG/100ML; MG/100ML; MG/100ML
125 INJECTION, SOLUTION INTRAVENOUS CONTINUOUS
Status: DISCONTINUED | OUTPATIENT
Start: 2021-01-24 | End: 2021-01-26 | Stop reason: HOSPADM

## 2021-01-24 RX ADMIN — FENTANYL CITRATE 25 MCG: 50 INJECTION INTRAMUSCULAR; INTRAVENOUS at 11:33

## 2021-01-24 RX ADMIN — FENTANYL CITRATE 25 MCG: 50 INJECTION INTRAMUSCULAR; INTRAVENOUS at 11:04

## 2021-01-24 RX ADMIN — Medication 100 MG: at 08:55

## 2021-01-24 RX ADMIN — SODIUM CHLORIDE, SODIUM LACTATE, POTASSIUM CHLORIDE, AND CALCIUM CHLORIDE: .6; .31; .03; .02 INJECTION, SOLUTION INTRAVENOUS at 10:09

## 2021-01-24 RX ADMIN — FENTANYL CITRATE 25 MCG: 50 INJECTION INTRAMUSCULAR; INTRAVENOUS at 10:39

## 2021-01-24 RX ADMIN — PROPOFOL 50 MG: 10 INJECTION, EMULSION INTRAVENOUS at 09:22

## 2021-01-24 RX ADMIN — ACETAMINOPHEN 650 MG: 325 TABLET, FILM COATED ORAL at 19:59

## 2021-01-24 RX ADMIN — SODIUM CHLORIDE, SODIUM LACTATE, POTASSIUM CHLORIDE, AND CALCIUM CHLORIDE 125 ML/HR: .6; .31; .03; .02 INJECTION, SOLUTION INTRAVENOUS at 23:24

## 2021-01-24 RX ADMIN — LIDOCAINE HYDROCHLORIDE 50 MG: 10 INJECTION, SOLUTION EPIDURAL; INFILTRATION; INTRACAUDAL at 08:55

## 2021-01-24 RX ADMIN — DOCUSATE SODIUM 100 MG: 100 CAPSULE, LIQUID FILLED ORAL at 13:20

## 2021-01-24 RX ADMIN — ONDANSETRON 4 MG: 2 INJECTION INTRAMUSCULAR; INTRAVENOUS at 09:03

## 2021-01-24 RX ADMIN — Medication 62.5 MILLI-UNITS/MIN: at 12:59

## 2021-01-24 RX ADMIN — BUPIVACAINE HYDROCHLORIDE 10 ML: 5 INJECTION, SOLUTION PERINEURAL at 09:47

## 2021-01-24 RX ADMIN — HYDROMORPHONE HYDROCHLORIDE 0.5 MG: 1 INJECTION, SOLUTION INTRAMUSCULAR; INTRAVENOUS; SUBCUTANEOUS at 09:14

## 2021-01-24 RX ADMIN — PROPOFOL 200 MG: 10 INJECTION, EMULSION INTRAVENOUS at 08:55

## 2021-01-24 RX ADMIN — SODIUM CHLORIDE, SODIUM LACTATE, POTASSIUM CHLORIDE, AND CALCIUM CHLORIDE 999 ML/HR: .6; .31; .03; .02 INJECTION, SOLUTION INTRAVENOUS at 07:30

## 2021-01-24 RX ADMIN — KETOROLAC TROMETHAMINE 30 MG: 30 INJECTION, SOLUTION INTRAMUSCULAR at 09:16

## 2021-01-24 RX ADMIN — KETOROLAC TROMETHAMINE 30 MG: 30 INJECTION, SOLUTION INTRAMUSCULAR at 23:23

## 2021-01-24 RX ADMIN — CEFAZOLIN SODIUM 1000 MG: 1 SOLUTION INTRAVENOUS at 08:48

## 2021-01-24 RX ADMIN — FENTANYL CITRATE 50 MCG: 50 INJECTION, SOLUTION INTRAMUSCULAR; INTRAVENOUS at 09:03

## 2021-01-24 RX ADMIN — SODIUM CHLORIDE, SODIUM LACTATE, POTASSIUM CHLORIDE, AND CALCIUM CHLORIDE 125 ML/HR: .6; .31; .03; .02 INJECTION, SOLUTION INTRAVENOUS at 15:55

## 2021-01-24 RX ADMIN — BUPIVACAINE 20 ML: 13.3 INJECTION, SUSPENSION, LIPOSOMAL INFILTRATION at 10:00

## 2021-01-24 RX ADMIN — Medication 250 MILLI-UNITS/MIN: at 09:00

## 2021-01-24 RX ADMIN — Medication 500 MG: at 08:59

## 2021-01-24 RX ADMIN — FENTANYL CITRATE 25 MCG: 50 INJECTION INTRAMUSCULAR; INTRAVENOUS at 11:17

## 2021-01-24 RX ADMIN — ACETAMINOPHEN 650 MG: 325 TABLET, FILM COATED ORAL at 13:20

## 2021-01-24 RX ADMIN — KETOROLAC TROMETHAMINE 30 MG: 30 INJECTION, SOLUTION INTRAMUSCULAR at 16:30

## 2021-01-24 RX ADMIN — FENTANYL CITRATE 50 MCG: 50 INJECTION, SOLUTION INTRAMUSCULAR; INTRAVENOUS at 09:10

## 2021-01-24 RX ADMIN — DEXAMETHASONE SODIUM PHOSPHATE 4 MG: 4 INJECTION INTRA-ARTICULAR; INTRALESIONAL; INTRAMUSCULAR; INTRAVENOUS; SOFT TISSUE at 09:03

## 2021-01-24 NOTE — PROGRESS NOTES
Progress Note - OB/GYN  Jose Miguel Willis 26 y o  female MRN: [id number]  Unit/Bed#: -01 Encounter: [id number]      Subjective:  Patient POD 0 from 1LTCS  Doing well  Pain is well controlled  On toradol and tylenol scheduled, dilaudid PRN for severe pain  She denies chest pain, shortness of breath, abdominal pain, fever and chills and leg pain  She has a good in place and UOP is adequate  Denies nausea and vomiting  She is breast feeding  Vitals:   /65 (BP Location: Right arm)   Pulse 88   Temp 97 5 °F (36 4 °C) (Oral)   Resp 18   LMP 04/27/2020 (Exact Date)   SpO2 97%   Breastfeeding Yes       Intake/Output Summary (Last 24 hours) at 1/24/2021 1432  Last data filed at 1/24/2021 1415  Gross per 24 hour   Intake 1000 ml   Output 740 ml   Net 260 ml       Invasive Devices     Peripheral Intravenous Line            Peripheral IV 01/24/21 Dorsal (posterior); Left Wrist less than 1 day    Peripheral IV 01/24/21 Left;Ventral (anterior) Forearm less than 1 day                Physical Exam:   GEN: Jose Miguel Willis appears well, alert and oriented x 3, pleasant and cooperative   ABDOMEN: soft, no tenderness, no distention, fundus @ umbilicus, Incision C/D/I  EXTREMITIES: SCDs on      Labs:   Admission on 01/24/2021   Component Date Value    ABO Grouping 01/24/2021 A     Rh Factor 01/24/2021 Positive     Antibody Screen 01/24/2021 Negative     Specimen Expiration Date 01/24/2021 [id number]     WBC 01/24/2021 17 60*    RBC 01/24/2021 4 08     Hemoglobin 01/24/2021 11 5     Hematocrit 01/24/2021 35 7     MCV 01/24/2021 88     MCH 01/24/2021 28 2     MCHC 01/24/2021 32 2     RDW 01/24/2021 15 0     Platelets 59/46/0428 333     MPV 01/24/2021 10 5     Extra Tube 01/24/2021 Hold for add-ons       Amph/Meth UR 01/24/2021 Negative     Barbiturate Ur 01/24/2021 Negative     Benzodiazepine Urine 01/24/2021 Negative     Cocaine Urine 01/24/2021 Negative     Methadone Urine 01/24/2021 Negative  Opiate Urine 2021 Negative     PCP Ur 2021 Negative     THC Urine 2021 Negative     Oxycodone Urine 2021 Negative     pH, Cord Lew 2021 7 333     pCO2, Cord Lew 2021 52 8*    pO2, Cord Lew 2021 26 6     HCO3, Cord Lew 2021 27 4     Base Exc, Cord Lew 2021 0 6*    O2 Cont, Cord Lew 2021 12 6     O2 HGB,VENOUS CORD 2021 63 7     pH, Cord Art 2021 7  318     pCO2, Cord Art 2021 52 0     pO2, Cord Art 2021 20 2     HCO3, Cord Art 2021 26 1     Base Exc, Cord Art 2021 -0 8*    O2 Content, Cord Art 2021 9 1     O2 Hgb, Arterial Cord 2021 45 6          Patient Active Problem List   Diagnosis    Chronic right hip pain    S/P  section    Inflammatory arthritis    Reactive arthritis ([facility] [address] )    COVID-19 affecting pregnancy in third trimester    Pregnancy affected by fetal growth restriction    Primary osteoarthritis of right hip        Assessment:  Postop Day #0 s/p1LTCS, stable    Plan:   POD 0    Postoperative care   Hgb 11 5 g/dL --> f/u AM CBC   Urinary output adequate, good in place    Pain control as needed    Encourage ambulation   Encourage breastfeeding   Anticipate discharge POD 2-3     Alexis Weaver MD  2021  2:32 PM

## 2021-01-24 NOTE — ANESTHESIA PREPROCEDURE EVALUATION
Procedure:   SECTION () (N/A Uterus)    Relevant Problems   GYN   (+) 38 weeks gestation of pregnancy      MUSCULOSKELETAL   (+) Inflammatory arthritis   (+) Primary osteoarthritis of right hip   (+) Reactive arthritis ([address] )      Other   (+) COVID-19 affecting pregnancy in third trimester   (+) Chronic right hip pain   (+) Pregnancy affected by fetal growth restriction      COVID positive in Dec 2020  Pt with hx of scoliosis, had corrective surgery 10 years ago at [facility]  There are no records In Care Everywhere  As per patient, Smith rods at Thoracic area -pelvis area  Incision scar seen around T7 area (level of shoulder blades)  GERD from pregnancy, on TUMS PRN      Physical Exam    Airway    Mallampati score: II  TM Distance: >3 FB  Neck ROM: full     Dental   No notable dental hx     Cardiovascular      Pulmonary      Other Findings        Anesthesia Plan  ASA Score- 2     Anesthesia Type- general with ASA Monitors  Additional Monitors:   Airway Plan:     Comment: Discussed plan of anesthesia with patient  Pt agreeable with GA,  Uncertain where the rods are placed and degree of difficulty with epidural placement is high  Pt also in labor and uncomfortable  She is agreeable to postop pain control with Exparel TAP block  GA with ETT, IV, antiemetics, Exparel TAP block for postop pain control    Plan Factors-    Chart reviewed  Existing labs reviewed  Patient summary reviewed  Patient is not a current smoker  Patient did not smoke on day of surgery  Induction- intravenous and rapid sequence induction  Postoperative Plan-   Planned trial extubation    Informed Consent- Anesthetic plan and risks discussed with patient  I personally reviewed this patient with the CRNA  Discussed and agreed on the Anesthesia Plan with the CRNA  Martha Lamb

## 2021-01-24 NOTE — H&P
[address]  John 26 y o  female MRN: [id number]  Unit/Bed#: LD TRIAGE  Encounter: [id number]    Db Oconnor is a patient of [address] [place] [address] Street    SUBJECTIVE:    Chief Complaint: contractions    HPI: Db Oconnor is a 26 y o  Shelia Gibbons with an GENA of 2021, by Last Menstrual Period at 38w6d who is being admitted for labor   She complains of uterine contractions, occurring every 1-3 minutes, has no LOF, and reports no VB  She states she has felt good FM  She will be a 1LTCS due to osteoarthritis of her hip  Pregnancy complications: osteoarthritis of right hip, COVD+ in December     Patient Active Problem List   Diagnosis    Chronic right hip pain    38 weeks gestation of pregnancy    Inflammatory arthritis    Reactive arthritis ([facility] [address] )    COVID-19 affecting pregnancy in third trimester    Pregnancy affected by fetal growth restriction    Primary osteoarthritis of right hip       Baby complications/comments: noneo    Review of Systems   Constitutional: Negative for chills and fever  HENT: Negative for sore throat and trouble swallowing  Eyes: Negative for visual disturbance  Respiratory: Negative for cough and shortness of breath  Cardiovascular: Negative for chest pain and leg swelling  Gastrointestinal: Positive for abdominal pain  Negative for diarrhea and nausea  Genitourinary: Positive for vaginal discharge and vaginal pain  Negative for dysuria and vaginal bleeding  Neurological: Negative for headaches  Psychiatric/Behavioral: Negative          OB History    Para Term  AB Living   1 0 0 0 0 0   SAB TAB Ectopic Multiple Live Births   0 0 0 0 0      # Outcome Date GA Lbr Tamir/2nd Weight Sex Delivery Anes PTL Lv   1 Current               Obstetric Comments   Menarche: 16       Past Medical History:   Diagnosis Date    Arthritis     right hip    History of transfusion     unsure - could have been with surgery on back    Knee effusion, left     Need for HPV vaccination 2012    completed series    Scoliosis     Varicella     vaccine    Varicella vaccination        Past Surgical History:   Procedure Laterality Date    HIP SURGERY Right     removal of benign bony growth    SPINAL FUSION  2010       Social History     Tobacco Use    Smoking status: Former Smoker     Quit date: 2020     Years since quittin 7    Smokeless tobacco: Never Used   Substance Use Topics    Alcohol use: Not Currently     Frequency: Monthly or less     Comment: rarely prior to confirmed pregnancy       No Known Allergies    Medications Prior to Admission   Medication    Prenatal MV & Min w/FA-DHA (PRENATAL ADULT GUMMY/DHA/FA) 0 4-25 MG CHEW    sulfaSALAzine (AZULFIDINE) 500 mg tablet           OBJECTIVE:  Vitals:  Temp:  [97 °F (36 1 °C)] 97 °F (36 1 °C)  HR:  [78-90] 78  Resp:  [18-20] 20  BP: (130-140)/(61-66) 130/61  There is no height or weight on file to calculate BMI  Physical Exam:  Physical Exam  Constitutional:       Appearance: Normal appearance  HENT:      Nose: No congestion  Mouth/Throat:      Mouth: Mucous membranes are moist    Cardiovascular:      Rate and Rhythm: Normal rate and regular rhythm  Pulmonary:      Breath sounds: Normal breath sounds  Abdominal:      Tenderness: There is abdominal tenderness  Comments: Due to contractions   Gravid abdomen    Musculoskeletal:      Right lower leg: No edema  Left lower leg: No edema  Neurological:      Mental Status: She is alert and oriented to person, place, and time  Skin:     General: Skin is warm and dry     Psychiatric:         Mood and Affect: Mood normal           SVE: 3/80/-1       FHT: 130 mod variability, + acels no decels        TOCO: ctx 1-3 minutes       Lab Results   Component Value Date    WBC 10 83 (H) 2020    HGB 11 3 (L) 2020    HCT 35 9 2020     2020     No results found for: NA, K, CL, CO2, BUN, CREATININE, GLUCOSE, AST, ALT    Prenatal Labs: Reviewed      Blood type: A+  Antibody: negative  Group B strep: negative  HIV: negative  Hepatitis B: negative  RPR: non-reactive  Rubella: Immune  1 hour Glucose: 74  Platelets: 663  Hgb: 11 3    Assessment: 26 y o   at 38w6d admitted for labor and will be a 1LTCS     SVE: 3/80/-1  FHT: cat 1   Clinical EFW: 7 ;   Vertex confirmed by manual exam   GBS status: neg          Plan:   · Admit, 1LTCS   · CBC, RPR, Type & Screen  · Analgesia for  section   · Antibiotics 1g Ancef for surgical ppx     Dr Faheem Quiñonez aware    >2 Alvena Blocker, MD  OB/GYN PGY-1  2021  6:36 AM

## 2021-01-24 NOTE — LACTATION NOTE
This note was copied from a baby's chart  Assisted infant to breast in cross cradle and cradle holds  After several attempts, infant did latch and nurse  Reviewed signs of correct positioning and latch

## 2021-01-24 NOTE — LACTATION NOTE
This note was copied from a baby's chart  Mom states she wishes to breast and formula feed infant  Stressed need to offer breast first before bottle, then follow with bottle if needed  Reviewed paced bottle feeding  Reviewed normal  infant feeding patterns I the first few days and encouraged feeding on cue and at least every 3 hours  Given admission breastfeeding pkat and same reviewed  Encouraged to call for assistance a as needed

## 2021-01-24 NOTE — OP NOTE
OPERATIVE REPORT  PATIENT NAME: Digna Saunders    :  1995  MRN: [id number]  Pt Location: AN L&D OR ROOM 02    SURGERY DATE: 2021    Surgeon(s) and Role:     * Yajaira Lozano DO - Primary     * Darcy Meyers DO - [address] [place] [address] E MD Christi - Assisting    Preop Diagnosis:  Hip osteoarthritis [M16 9]  Premature labor in third trimester [O60 03]    Post-Op Diagnosis Codes:     * Hip osteoarthritis [M16 9]     * Premature labor in third trimester [O60 03]    Post-operative Diagnosis:   1LTCS     Attending: Yajaira Lozano DO  Resident: Vane Decker MD    Maternal Findings:  Normal uterus  Normal tubes and ovaries bilaterally  No adhesions  No difficulty noted from skin to delivery     Findings:  Viable male weighing 6lbs 0 7oz; Apgar scores of 9 at one minute and 9 at five minutes  clear amniotic fluid  Normal placenta with 3-vessel cord    Arterial and Venous Gases:  Umbilical Cord Venous Blood Gas:  Results from last 7 days   Lab Units 21  0900   PH COV  7 333   PCO2 COV mm HG 52 8*   HCO3 COV mmol/L 27 4   BASE EXC COV mmol/L 0 6*   O2 CT CD VB mL/dL 12 6   O2 HGB, VENOUS CORD % 02 1     Umbilical Cord Arterial Blood Gas:  Results from last 7 days   Lab Units 21  0900   PH COA  7 318   PCO2 COA  52 0   PO2 COA mm HG 20 2   HCO3 COA mmol/L 26 1   BASE EXC COA mmol/L -0 8*   O2 CONTENT CORD ART ml/dl 9 1   O2 HGB, ARTERIAL CORD % 45 6       Specimens: Arterial and venous cord gases, cord blood, segment of umbilical cord, placenta to storage    Quantitative Blood Loss: 440 mL    Fluids: 125mL LR    Drains: Melendez catheter           Complications:  None; patient tolerated the procedure well  Disposition: PACU            Condition: stable    Procedure Details   The patient was seen prior to the procedure  Risks, benefits, possible complications, alternate treatment options, and expected outcomes were discussed with the patient    The patient agreed with the proposed plan and gave informed consent for a primary low transverse  section  The patient was taken to the [facility] Operating Room at [address] [facility] where she received general anesthesia  For infection prophylaxis, she received 1g ancef and 500mg Azithromycin preoperatively  Fetal heart tones in the OR were assessed and noted to be within normal limits and a Melendez catheter and SCDs were placed  The abdomen was prepped with Chloraprep , the vagina was prepped with diluted Chlorhexidine, and following appropriate drying time, the patient was draped in the usual sterile manner  A Time Out was held and the above information confirmed  The patient was identified as Joelene Plane and the procedure verified as a  Delivery for active labor and osteoarthritis of the hip  A Pfannenstiel incision was made and carried down through the underlying subcutaneous tissue to the fascia using a scalpel  The rectus fascia was then nicked in the midline and dissected laterally bluntly  The rectus muscles were  and the peritoneum was identified, entered, and extended longitudinally with blunt dissection  The bladder blade was inserted  A low transverse uterine incision was made with the scalpel and extended cephalocaudad with blunt dissection  The amnion was entered bluntly  The fetal headwas palpated, elevated, and delivered through the uterine incision followed by the body without difficulty  Time of birth was noted at 0900  There was noted to be spontaneous cry and good tone  There was no apparent injury to the   The umbilical cord was doubly clamped and cut after 30 seconds to allow for delayed cord clamping  The infant was handed off to the  providers  Arterial and venous cord gases, cord blood, and a segment of umbilical cord were obtained for evaluation    The placenta delivered spontaneously at 0902 with uterine fundal massage and appeared normal  The uterus was exteriorized and cleaned out with a moist lap sponge  The uterine incision was closed with a running locked suture of Monocryl  A second layer of the same suture was used to imbricate the first   Hemostasis was noted to be excellent  Normal saline solution was used to irrigate the posterior culdesac  The uterus was returned to the abdomen  The paracolic gutters were inspected and cleared of all clots  The peritoneum was closed with 3-0 Plain cat gut  The fascia was closed with a running suture of 0 Vicryl  The skin was closed with a subcuticular running suture of 4-0 Monocryl  Exophin and telfa were placed on the incision  The patient appeared to tolerate the procedure very well  Lap sponge, needle, and instrument counts were correct x2  The patient's fundus was palpated and the uterus was expressed  She was then cleaned and transferred to her postpartum recovery room in stable condition and her infant went to the  nursery  Attending Attestation: Dr Stephanie Young 77 , DO was present for the entire procedure      Elly Stone MD   OB/GYN, PGY-1   2021 9:50 AM

## 2021-01-24 NOTE — DISCHARGE INSTRUCTIONS
WHAT YOU NEED TO KNOW:   A , or  section, is abdominal surgery to deliver your baby  DISCHARGE INSTRUCTIONS:   Call your local emergency number (911 in the [address]) if:   · You feel lightheaded, short of breath, and have chest pain  · You cough up blood  Call your obstetrician if:   · Blood soaks through your bandage  · Your stitches come apart  · Your arm or leg feels warm, tender, and painful  It may look swollen and red  · You have heavy vaginal bleeding that fills 1 or more sanitary pads in 1 hour  · You have a fever  · Your incision is swollen, red, or draining pus  · You have questions or concerns about yourself or your baby  Medicines: You may  need any of the following:  · Prescription pain medicine  may be given  Ask your healthcare provider how to take this medicine safely  Some prescription pain medicines contain acetaminophen  Do not take other medicines that contain acetaminophen without talking to your healthcare provider  Too much acetaminophen may cause liver damage  Prescription pain medicine may cause constipation  Ask your healthcare provider how to prevent or treat constipation  · Acetaminophen  decreases pain and fever  It is available without a doctor's order  Ask how much to take and how often to take it  Follow directions  Read the labels of all other medicines you are using to see if they also contain acetaminophen, or ask your doctor or pharmacist  Acetaminophen can cause liver damage if not taken correctly  Do not use more than 4 grams (4,000 milligrams) total of acetaminophen in one day  · NSAIDs , such as ibuprofen, help decrease swelling, pain, and fever  NSAIDs can cause stomach bleeding or kidney problems in certain people  If you take blood thinner medicine, always ask your healthcare provider if NSAIDs are safe for you  Always read the medicine label and follow directions  · Take your medicine as directed  Contact your healthcare provider if you think your medicine is not helping or if you have side effects  Tell him or her if you are allergic to any medicine  Keep a list of the medicines, vitamins, and herbs you take  Include the amounts, and when and why you take them  Bring the list or the pill bottles to follow-up visits  Carry your medicine list with you in case of an emergency  Wound care:  Carefully wash your incision wound with soap and water every day  Keep your wound clean and dry  Wear loose, comfortable clothes that do not rub against your wound  Ask about bathing and showering  Limit activity as directed:   · Ask when it is safe for you to drive, walk up stairs, lift heavy objects, and have sex  · Ask when it is okay to exercise, and what types of exercise to do  Start slowly and do more as you get stronger  Drink liquids as directed:  Liquids help keep you hydrated after your procedure and decrease your risk for a blood clot  Ask how much liquid to drink each day and which liquids are best for you  Follow up with your obstetrician as directed: You may need to return to have your stitches or staples removed  Write down your questions so you remember to ask them during your visits  © Copyright [address] Information is for End User's use only and may not be sold, redistributed or otherwise used for commercial purposes  All illustrations and images included in CareNotes® are the copyrighted property of A D A M , Inc  or [facility] Konstantin Chatterjee   The above information is an  only  It is not intended as medical advice for individual conditions or treatments  Talk to your doctor, nurse or pharmacist before following any medical regimen to see if it is safe and effective for you

## 2021-01-24 NOTE — DISCHARGE SUMMARY
Discharge Summary - OB/GYN   Digna Saunders 26 y o  female MRN: [id number]  Unit/Bed#: LD OR 2 Encounter: [id number]      Admission Date: 2021     Discharge Date: 21    Admitting Diagnosis:   1  Pregnancy at 38w6d  2  Right hip osteoarthritis    Discharge Diagnosis:   Same, delivered    Procedures: primary  section, low transverse incision    Delivery Attending: Yajaira Lozano DO  Discharge Attending: Dr Francis Roles Course:     Digna Saunders is a 26 y o   at 38w6d wks who was initially admitted for labor  Due to the patient's history of osteoarthritis of her right hip she was consented for a primary  section  She delivered a viable male  on  at 0900  Weight 6lbs 0 7oz via primary  section, low transverse incision  Apgars were 9 (1 min) and 9 (5 min)   was transferred to  nursery  Patient tolerated the procedure well and was transferred to recovery in stable condition  Her post-operative course was uncomplicated  Preoperative hemoglobin was 11 5, postoperative was 9 2  Her postoperative pain was well controlled with oral analgesics  On day of discharge, she was ambulating and able to reasonably perform all ADLs  She was voiding and had appropriate bowel function  Pain was well controlled  She was discharged home on post-operative day #2 without complications  Patient was instructed to follow up with her OB as an outpatient and was given appropriate warnings to call provider if she develops signs of infection or uncontrolled pain  Complications: none apparent    Condition at discharge: stable     Discharge instructions/Information to patient and family:   See after visit summary for information provided to patient and family  Provisions for Follow-Up Care:  See after visit summary for information related to follow-up care and any pertinent home health orders        Disposition: Home    Planned Readmission: No    Discharge Medications: For a complete list of the patient's medications, please refer to her med rec        Enedelia Vazquez MD  PGY-1, OB/GYN  1/26/2021   9:14 AM

## 2021-01-24 NOTE — ANESTHESIA POSTPROCEDURE EVALUATION
Post-Op Assessment Note    CV Status:  Stable  Pain Score: 2    Pain management: adequate     Mental Status:  Alert and awake   Hydration Status:  Euvolemic   PONV Controlled:  Controlled   Airway Patency:  Patent      Post Op Vitals Reviewed: Yes      Staff: CRNA         No complications documented      BP   126/78   Temp  97   Pulse  84   Resp   18   SpO2   100

## 2021-01-24 NOTE — ANESTHESIA PROCEDURE NOTES
Peripheral Block    Patient location during procedure: OR  Start time: 1/24/2021 9:43 AM  Reason for block: at surgeon's request and post-op pain management  Staffing  Anesthesiologist: Analilia Mueller MD  Performed: anesthesiologist   Preanesthetic Checklist  Completed: patient identified, site marked, surgical consent, pre-op evaluation, timeout performed, IV checked, risks and benefits discussed and monitors and equipment checked  Peripheral Block  Patient position: supine  Prep: ChloraPrep  Patient monitoring: heart rate, cardiac monitor, continuous pulse ox and frequent blood pressure checks  Block type: TAP  Laterality: bilateral  Injection technique: single-shot  Procedures: ultrasound guided, Ultrasound guidance required for the procedure to increase accuracy and safety of medication placement and decrease risk of complications  Ultrasound permanent image savedbupivacaine (MARCAINE) 0 5 % perineural infiltration, 10 mL (with 10 cc exparel and 10 cc NS on each side)  Needle  Needle type: Stimuplex   Needle gauge: 26 G  Needle length: 4 cm    Needle localization: ultrasound guidance  Needle insertion depth: 2 cm  Assessment  Injection assessment: incremental injection, local visualized surrounding nerve on ultrasound and negative aspiration for heme  Paresthesia pain: none  Heart rate change: yes  Slow fractionated injection: yes  Post-procedure:  site cleaned  patient tolerated the procedure well with no immediate complications  Additional Notes  Ultrasound guidance  Ultrasound pic in Epic media section  Bilateral TAP blocks  Each side with exparel 10 cc, 0 5% bupivacaine 10 cc, 10 cc NS

## 2021-01-25 LAB
ERYTHROCYTE [DISTWIDTH] IN BLOOD BY AUTOMATED COUNT: 15 % (ref 11.6–15.1)
HCT VFR BLD AUTO: 29.3 % (ref 34.8–46.1)
HGB BLD-MCNC: 9.2 G/DL (ref 11.5–15.4)
MCH RBC QN AUTO: 27.8 PG (ref 26.8–34.3)
MCHC RBC AUTO-ENTMCNC: 31.4 G/DL (ref 31.4–37.4)
MCV RBC AUTO: 89 FL (ref 82–98)
PLATELET # BLD AUTO: 298 THOUSANDS/UL (ref 149–390)
PMV BLD AUTO: 10.4 FL (ref 8.9–12.7)
RBC # BLD AUTO: 3.31 MILLION/UL (ref 3.81–5.12)
RPR SER QL: NORMAL
WBC # BLD AUTO: 22.58 THOUSAND/UL (ref 4.31–10.16)

## 2021-01-25 PROCEDURE — 85027 COMPLETE CBC AUTOMATED: CPT | Performed by: OBSTETRICS & GYNECOLOGY

## 2021-01-25 PROCEDURE — 99024 POSTOP FOLLOW-UP VISIT: CPT | Performed by: OBSTETRICS & GYNECOLOGY

## 2021-01-25 RX ADMIN — ACETAMINOPHEN 650 MG: 325 TABLET, FILM COATED ORAL at 20:03

## 2021-01-25 RX ADMIN — KETOROLAC TROMETHAMINE 30 MG: 30 INJECTION, SOLUTION INTRAMUSCULAR at 12:06

## 2021-01-25 RX ADMIN — DOCUSATE SODIUM 100 MG: 100 CAPSULE, LIQUID FILLED ORAL at 17:09

## 2021-01-25 RX ADMIN — ACETAMINOPHEN 650 MG: 325 TABLET, FILM COATED ORAL at 17:08

## 2021-01-25 RX ADMIN — DOCUSATE SODIUM 100 MG: 100 CAPSULE, LIQUID FILLED ORAL at 10:27

## 2021-01-25 RX ADMIN — ACETAMINOPHEN 650 MG: 325 TABLET, FILM COATED ORAL at 10:27

## 2021-01-25 RX ADMIN — ACETAMINOPHEN 650 MG: 325 TABLET, FILM COATED ORAL at 05:35

## 2021-01-25 RX ADMIN — KETOROLAC TROMETHAMINE 30 MG: 30 INJECTION, SOLUTION INTRAMUSCULAR at 05:35

## 2021-01-25 RX ADMIN — ACETAMINOPHEN 650 MG: 325 TABLET, FILM COATED ORAL at 01:20

## 2021-01-25 NOTE — PLAN OF CARE
Problem: POSTPARTUM  Goal: Experiences normal postpartum course  Description: INTERVENTIONS:  - Monitor maternal vital signs  - Assess uterine involution and lochia  Outcome: Progressing  Goal: Appropriate maternal -  bonding  Description: INTERVENTIONS:  - Identify family support  - Assess for appropriate maternal/infant bonding   -Encourage maternal/infant bonding opportunities  - Referral to  or  as needed  Outcome: Progressing  Goal: Establishment of infant feeding pattern  Description: INTERVENTIONS:  - Assess breast/bottle feeding  - Refer to lactation as needed  Outcome: Progressing  Goal: Incision(s), wounds(s) or drain site(s) healing without S/S of infection  Description: INTERVENTIONS  - Assess and document risk factors for skin impairment   - Assess and document dressing, incision, wound bed, drain sites and surrounding tissue  - Consider nutrition services referral as needed  - Oral mucous membranes remain intact  - Provide patient/ family education  Outcome: Progressing     Problem: PAIN - ADULT  Goal: Verbalizes/displays adequate comfort level or baseline comfort level  Description: Interventions:  - Encourage patient to monitor pain and request assistance  - Assess pain using appropriate pain scale  - Administer analgesics based on type and severity of pain and evaluate response  - Implement non-pharmacological measures as appropriate and evaluate response  - Consider cultural and social influences on pain and pain management  - Notify physician/advanced practitioner if interventions unsuccessful or patient reports new pain  Outcome: Progressing     Problem: INFECTION - ADULT  Goal: Absence or prevention of progression during hospitalization  Description: INTERVENTIONS:  - Assess and monitor for signs and symptoms of infection  - Monitor lab/diagnostic results  - Monitor all insertion sites, i e  indwelling lines, tubes, and drains  - Monitor endotracheal if appropriate and nasal secretions for changes in amount and color  - [place] appropriate cooling/warming therapies per order  - Administer medications as ordered  - Instruct and encourage patient and family to use good hand hygiene technique  - Identify and instruct in appropriate isolation precautions for identified infection/condition  Outcome: Progressing  Goal: Absence of fever/infection during neutropenic period  Description: INTERVENTIONS:  - Monitor WBC    Outcome: Progressing     Problem: SAFETY ADULT  Goal: Patient will remain free of falls  Description: INTERVENTIONS:  - Assess patient frequently for physical needs  -  Identify cognitive and physical deficits and behaviors that affect risk of falls    -  [place] fall precautions as indicated by assessment   - Educate patient/family on patient safety including physical limitations  - Instruct patient to call for assistance with activity based on assessment  - Modify environment to reduce risk of injury  - Consider OT/PT consult to assist with strengthening/mobility  Outcome: Progressing  Goal: Maintain or return to baseline ADL function  Description: INTERVENTIONS:  -  Assess patient's ability to carry out ADLs; assess patient's baseline for ADL function and identify physical deficits which impact ability to perform ADLs (bathing, care of mouth/teeth, toileting, grooming, dressing, etc )  - Assess/evaluate cause of self-care deficits   - Assess range of motion  - Assess patient's mobility; develop plan if impaired  - Assess patient's need for assistive devices and provide as appropriate  - Encourage maximum independence but intervene and supervise when necessary  - Involve family in performance of ADLs  - Assess for home care needs following discharge   - Consider OT consult to assist with ADL evaluation and planning for discharge  - Provide patient education as appropriate  Outcome: Progressing  Goal: Maintain or return mobility status to optimal level  Description: INTERVENTIONS:  - Assess patient's baseline mobility status (ambulation, transfers, stairs, etc )    - Identify cognitive and physical deficits and behaviors that affect mobility  - Identify mobility aids required to assist with transfers and/or ambulation (gait belt, sit-to-stand, lift, walker, cane, etc )  - [place] fall precautions as indicated by assessment  - Record patient progress and toleration of activity level on Mobility SBAR; progress patient to next Phase/Stage  - Instruct patient to call for assistance with activity based on assessment  - Consider rehabilitation consult to assist with strengthening/weightbearing, etc   Outcome: Progressing     Problem: Knowledge Deficit  Goal: Patient/family/caregiver demonstrates understanding of disease process, treatment plan, medications, and discharge instructions  Description: Complete learning assessment and assess knowledge base    Interventions:  - Provide teaching at level of understanding  - Provide teaching via preferred learning methods  Outcome: Progressing     Problem: DISCHARGE PLANNING  Goal: Discharge to home or other facility with appropriate resources  Description: INTERVENTIONS:  - Identify barriers to discharge w/patient and caregiver  - Arrange for needed discharge resources and transportation as appropriate  - Identify discharge learning needs (meds, wound care, etc )  - Arrange for interpretive services to assist at discharge as needed  - Refer to Case Management Department for coordinating discharge planning if the patient needs post-hospital services based on physician/advanced practitioner order or complex needs related to functional status, cognitive ability, or social support system  Outcome: Progressing

## 2021-01-25 NOTE — CASE MANAGEMENT
Consult: [facility] w/last use 5/2020; Per review of chart, MOB UDS negative; baby UDS negative on admission; Initial prenatal visit 6/26/20; SW closing out consult as no report of use after learning of pregnancy; No additional concerns are noted

## 2021-01-25 NOTE — PROGRESS NOTES
Progress Note - OB/GYN   Starlyn Snellen 26 y o  female MRN: [id number]  Unit/Bed#: -01 Encounter: [id number]    Assessment:  26 y o  Kristen Sheldon s/p Primary low transverse  section post-operative day 1  Pregnancy complicated by osteoarthritis of her R hip; delivery c/b use of GETA  Patient recovering well, Stable    Plan:  1  Postpartum  Continue routine post partum care  Pain management PRN  Encourage ambulation  Encourage breastfeeding    2  QBL 440cc  Hgb 11 5 --> 9 2  Melendez out, [place] today    3   Discharge  Anticipate d/c POD #2/3      Subjective/Objective   Chief Complaint:    Postpartum state    Subjective:   Pain: yes, cramping, improved with meds  Tolerating PO: yes  Voiding: yes  Flatus: yes  BM: no  Ambulating: yes  Breastfeeding:  yes  Chest pain: no  Shortness of breath: no  Leg pain: no  Lochia: moderate    Objective:     Vitals: Temp:  [96 8 °F (36 °C)-98 7 °F (37 1 °C)] 98 4 °F (36 9 °C)  HR:  [] 78  Resp:  [18-20] 18  BP: (116-141)/(58-83) 135/80       Intake/Output Summary (Last 24 hours) at 2021 5797  Last data filed at 2021 0602  Gross per 24 hour   Intake 2708 33 ml   Output 1890 ml   Net 818 33 ml         Physical Exam:   General: NAD, alert, oriented  Cardio: Regular rate and rhythm, no murmur  Resp: nonlabored breathing, clear to auscultation bilaterally  Abdomen: Soft, no distension/rebound/guarding/tenderness   Fundus: Firm, non-tender, fundus: at umbilicus  Incision: C/D/I  G/U: Moderate lochia noted on pad  Lower Extremities: Non-tender, no palpable cords    Medications:  Current Facility-Administered Medications   Medication Dose Route Frequency    acetaminophen (TYLENOL) tablet 650 mg  650 mg Oral Q4H    benzocaine-menthol-lanolin-aloe (DERMOPLAST) 20-0 5 % topical spray 1 application  1 application Topical A0N PRN    calcium carbonate (TUMS) chewable tablet 1,000 mg  1,000 mg Oral Daily PRN    diphenhydrAMINE (BENADRYL) tablet 25 mg  25 mg Oral Q6H PRN    docusate sodium (COLACE) capsule 100 mg  100 mg Oral BID    hydrocortisone 1 % cream 1 application  1 application Topical Daily PRN    HYDROmorphone (DILAUDID) injection 0 5 mg  0 5 mg Intravenous Q10 Min PRN    HYDROmorphone (DILAUDID) injection 1 mg  1 mg Intravenous Q4H PRN    ketorolac (TORADOL) injection 30 mg  30 mg Intravenous Q6H [address]    lactated ringers infusion  125 mL/hr Intravenous Continuous    metoclopramide (REGLAN) injection 10 mg  10 mg Intravenous Once PRN    ondansetron (ZOFRAN) injection 4 mg  4 mg Intravenous Q8H PRN    ondansetron (ZOFRAN) injection 4 mg  4 mg Intravenous Once PRN    witch hazel-glycerin (TUCKS) topical pad 1 pad  1 pad Topical Q4H PRN       Labs:   Recent Results (from the past 24 hour(s))   Rapid drug screen, urine    Collection Time: 01/24/21  7:37 AM   Result Value Ref Range    Amph/Meth UR Negative Negative    Barbiturate Ur Negative Negative    Benzodiazepine Urine Negative Negative    Cocaine Urine Negative Negative    Methadone Urine Negative Negative    Opiate Urine Negative Negative    PCP Ur Negative Negative    THC Urine Negative Negative    Oxycodone Urine Negative Negative   Blood gas, venous, cord    Collection Time: 01/24/21  9:00 AM   Result Value Ref Range    pH, Cord Lew 7 333 7 190 - 7 490    pCO2, Cord Lew 52 8 (H) 27 0 - 43 0 mm HG    pO2, Cord Lew 26 6 15 0 - 45 0 mm HG    HCO3, Cord Lew 27 4 12 2 - 28 6 mmol/L    Base Exc, Cord Lew 0 6 (L) 1 0 - 9 0 mmol/L    O2 Cont, Cord Lew 12 6 mL/dL    O2 HGB,VENOUS CORD 63 7 %   Blood gas, arterial, cord    Collection Time: 01/24/21  9:00 AM   Result Value Ref Range    pH, Cord Art 7 318 7 230 - 7 430    pCO2, Cord Art 52 0 30 0 - 60 0    pO2, Cord Art 20 2 5 0 - 25 0 mm HG    HCO3, Cord Art 26 1 17 3 - 27 3 mmol/L    Base Exc, Cord Art -0 8 (L) 3 0 - 11 0 mmol/L    O2 Content, Cord Art 9 1 ml/dl    O2 Hgb, Arterial Cord 45 6 %   CBC    Collection Time: 01/25/21  1:23 AM   Result Value Ref Range    WBC 22 58 (H) 4 31 - 10 16 Thousand/uL    RBC 3 31 (L) 3 81 - 5 12 Million/uL    Hemoglobin 9 2 (L) 11 5 - 15 4 g/dL    Hematocrit 29 3 (L) 34 8 - 46 1 %    MCV 89 82 - 98 fL    MCH 27 8 26 8 - 34 3 pg    MCHC 31 4 31 4 - 37 4 g/dL    RDW 15 0 11 6 - 15 1 %    Platelets 853 589 - 765 Thousands/uL    MPV 10 4 8 9 - 12 7 fL         Apryl Zuleta  Ob/Gyn PGY-1  1/25/2021  7:09 AM

## 2021-01-26 VITALS
DIASTOLIC BLOOD PRESSURE: 83 MMHG | OXYGEN SATURATION: 97 % | RESPIRATION RATE: 18 BRPM | TEMPERATURE: 98.1 F | HEART RATE: 109 BPM | SYSTOLIC BLOOD PRESSURE: 139 MMHG

## 2021-01-26 PROCEDURE — 99024 POSTOP FOLLOW-UP VISIT: CPT | Performed by: OBSTETRICS & GYNECOLOGY

## 2021-01-26 RX ORDER — DOCUSATE SODIUM 100 MG/1
100 CAPSULE, LIQUID FILLED ORAL 2 TIMES DAILY
Qty: 10 CAPSULE | Refills: 0 | Status: SHIPPED | OUTPATIENT
Start: 2021-01-26 | End: 2021-10-01

## 2021-01-26 RX ORDER — ACETAMINOPHEN 325 MG/1
650 TABLET ORAL EVERY 4 HOURS
Qty: 90 TABLET | Refills: 3 | Status: SHIPPED | OUTPATIENT
Start: 2021-01-26 | End: 2021-10-01

## 2021-01-26 RX ADMIN — ACETAMINOPHEN 650 MG: 325 TABLET, FILM COATED ORAL at 04:54

## 2021-01-26 RX ADMIN — DOCUSATE SODIUM 100 MG: 100 CAPSULE, LIQUID FILLED ORAL at 09:35

## 2021-01-26 RX ADMIN — ACETAMINOPHEN 650 MG: 325 TABLET, FILM COATED ORAL at 09:35

## 2021-01-26 RX ADMIN — ACETAMINOPHEN 650 MG: 325 TABLET, FILM COATED ORAL at 00:14

## 2021-01-26 NOTE — PROGRESS NOTES
Progress Note - OB/GYN   Joshua Branch 26 y o  female MRN: [id number]  Unit/Bed#:  304-01 Encounter: [id number]    Assessment:  Postop Day #2 s/p primary LTCS, stable, baby in room    Plan:  1) Postoperative care   Voiding appropriately   Encourage ambulation   Encourage breastfeeding   Anticipate discharge today if baby is cleared     Subjective/Objective   Chief Complaint:     Post delivery       Subjective:     Pain: yes, incisional, improved with current regimen of PO meds  Tolerating PO: yes  Voiding: yes  Flatus: yes  Bowel Movement: yes  Ambulating: yes  Chest pain: no  Shortness of breath: no  Leg pain: no  Lochia: minimal  Infant feeding: breast, formula      Objective:     Vitals: /64   Pulse 93   Temp 98 °F (36 7 °C) (Oral)   Resp 18   LMP 04/27/2020 (Exact Date)   SpO2 97%   Breastfeeding Yes       Intake/Output Summary (Last 24 hours) at 1/26/2021 3514  Last data filed at 1/25/2021 1442  Gross per 24 hour   Intake --   Output 900 ml   Net -900 ml       Physical Exam:     General: alert and oriented x 3, in no apparent distress  Cardiovascular: regular rate and rhythm  Respiratory: clear to auscultation bilaterally, no wheezes, rales or rhonchi  Abdomen: soft, non-tender, non-distended, no rebound or guarding  Pelvis: Uterine fundus firm and non-tender, at the umbilicus   Incision: clean/dry/intact without signs of inflammation   Extremities: Nontender    Lab Results   Component Value Date    WBC 22 58 (H) 01/25/2021    HGB 9 2 (L) 01/25/2021    HCT 29 3 (L) 01/25/2021    MCV 89 01/25/2021     01/25/2021         Queen Adonis MD  1/26/2021  6:10 AM

## 2021-01-26 NOTE — PLAN OF CARE
Problem: POSTPARTUM  Goal: Experiences normal postpartum course  Description: INTERVENTIONS:  - Monitor maternal vital signs  - Assess uterine involution and lochia  Outcome: Progressing  Goal: Appropriate maternal -  bonding  Description: INTERVENTIONS:  - Identify family support  - Assess for appropriate maternal/infant bonding   -Encourage maternal/infant bonding opportunities  - Referral to  or  as needed  Outcome: Progressing  Goal: Establishment of infant feeding pattern  Description: INTERVENTIONS:  - Assess breast/bottle feeding  - Refer to lactation as needed  Outcome: Progressing  Goal: Incision(s), wounds(s) or drain site(s) healing without S/S of infection  Description: INTERVENTIONS  - Assess and document risk factors for skin impairment   - Assess and document dressing, incision, wound bed, drain sites and surrounding tissue  - Consider nutrition services referral as needed  - Oral mucous membranes remain intact  - Provide patient/ family education  Outcome: Progressing     Problem: PAIN - ADULT  Goal: Verbalizes/displays adequate comfort level or baseline comfort level  Description: Interventions:  - Encourage patient to monitor pain and request assistance  - Assess pain using appropriate pain scale  - Administer analgesics based on type and severity of pain and evaluate response  - Implement non-pharmacological measures as appropriate and evaluate response  - Consider cultural and social influences on pain and pain management  - Notify physician/advanced practitioner if interventions unsuccessful or patient reports new pain  Outcome: Progressing     Problem: INFECTION - ADULT  Goal: Absence or prevention of progression during hospitalization  Description: INTERVENTIONS:  - Assess and monitor for signs and symptoms of infection  - Monitor lab/diagnostic results  - Monitor all insertion sites, i e  indwelling lines, tubes, and drains  - Monitor endotracheal if appropriate and nasal secretions for changes in amount and color  - [place] appropriate cooling/warming therapies per order  - Administer medications as ordered  - Instruct and encourage patient and family to use good hand hygiene technique  - Identify and instruct in appropriate isolation precautions for identified infection/condition  Outcome: Progressing  Goal: Absence of fever/infection during neutropenic period  Description: INTERVENTIONS:  - Monitor WBC    Outcome: Progressing     Problem: SAFETY ADULT  Goal: Patient will remain free of falls  Description: INTERVENTIONS:  - Assess patient frequently for physical needs  -  Identify cognitive and physical deficits and behaviors that affect risk of falls    -  [place] fall precautions as indicated by assessment   - Educate patient/family on patient safety including physical limitations  - Instruct patient to call for assistance with activity based on assessment  - Modify environment to reduce risk of injury  - Consider OT/PT consult to assist with strengthening/mobility  Outcome: Progressing  Goal: Maintain or return to baseline ADL function  Description: INTERVENTIONS:  -  Assess patient's ability to carry out ADLs; assess patient's baseline for ADL function and identify physical deficits which impact ability to perform ADLs (bathing, care of mouth/teeth, toileting, grooming, dressing, etc )  - Assess/evaluate cause of self-care deficits   - Assess range of motion  - Assess patient's mobility; develop plan if impaired  - Assess patient's need for assistive devices and provide as appropriate  - Encourage maximum independence but intervene and supervise when necessary  - Involve family in performance of ADLs  - Assess for home care needs following discharge   - Consider OT consult to assist with ADL evaluation and planning for discharge  - Provide patient education as appropriate  Outcome: Progressing  Goal: Maintain or return mobility status to optimal level  Description: INTERVENTIONS:  - Assess patient's baseline mobility status (ambulation, transfers, stairs, etc )    - Identify cognitive and physical deficits and behaviors that affect mobility  - Identify mobility aids required to assist with transfers and/or ambulation (gait belt, sit-to-stand, lift, walker, cane, etc )  - [place] fall precautions as indicated by assessment  - Record patient progress and toleration of activity level on Mobility SBAR; progress patient to next Phase/Stage  - Instruct patient to call for assistance with activity based on assessment  - Consider rehabilitation consult to assist with strengthening/weightbearing, etc   Outcome: Progressing     Problem: Knowledge Deficit  Goal: Patient/family/caregiver demonstrates understanding of disease process, treatment plan, medications, and discharge instructions  Description: Complete learning assessment and assess knowledge base    Interventions:  - Provide teaching at level of understanding  - Provide teaching via preferred learning methods  Outcome: Progressing     Problem: DISCHARGE PLANNING  Goal: Discharge to home or other facility with appropriate resources  Description: INTERVENTIONS:  - Identify barriers to discharge w/patient and caregiver  - Arrange for needed discharge resources and transportation as appropriate  - Identify discharge learning needs (meds, wound care, etc )  - Arrange for interpretive services to assist at discharge as needed  - Refer to Case Management Department for coordinating discharge planning if the patient needs post-hospital services based on physician/advanced practitioner order or complex needs related to functional status, cognitive ability, or social support system  Outcome: Progressing

## 2021-01-26 NOTE — LACTATION NOTE
This note was copied from a baby's chart  CONSULT - LACTATION  Baby Boy Rosy Gone) Scott Huang 2 days male MRN: [id number]    [facility] NURSERY Room / Bed:  304(N)/ 304(N) Encounter: [id number]    Maternal Information     MOTHER:  Mouna Darby  Maternal Age: 26 y o    OB History: # 1 - Date: 21, Sex: Male, Weight: 2740 g (6 lb 0 7 oz), GA: 38w6d, Delivery: , Low Transverse, Apgar1: 9, Apgar5: 9, Living: Living, Birth Comments: None   Previouse breast reduction surgery? No    Lactation history:   Has patient previously breast fed: No   How long had patient previously breast fed:     Previous breast feeding complications:       Past Surgical History:   Procedure Laterality Date    HIP SURGERY Right     removal of benign bony growth    [place]  DELIVERY ONLY N/A 2021    Procedure:  SECTION (); Surgeon: Bere Kwan DO;  Location: [facility] ;  Service: Obstetrics    SPINAL FUSION          Birth information:  YOB: 2020   Time of birth: 9:00 AM   Sex: male   Delivery type: , Low Transverse   Birth Weight: 2740 g (6 lb 0 7 oz)   Percent of Weight Change: -7%     Gestational Age: 38w7d   [unfilled]    Assessment     Breast and nipple assessment: normal assessment      Feeding recommendations:  breast feed on demand     Paulina Downey does not believe she has enough milk to feed her baby  She has been using formula at the end of feedings  Discussed risks for early supplementation: over feeding, longer digestion times, engorgement for mom, lower milk supply for mom, and nipple confusion  Benefits of breast feeding for infant's intestinal tract, less engorgement for mom, protection from multiple disease processes as infant develops, avoidance of over feeding for infant, less nipple confusion, and increased health benefits for mom  Discussed use of her Medela breast pump for home use    Encouraged Paulina Downey to make follow up appointment at [facility] for reassurance with breast feeding  She was agreeable  Message left for front office staff to contact diane when available to help schedule her  Met with mother to go over discharge breastfeeding booklet including the feeding log  Emphasized 8 or more (12) feedings in a 24 hour period, what to expect for the number of diapers per day of life and the progression of properties of the  stooling pattern  Reviewed breastfeeding and your lifestyle, storage and preparation of breast milk, how to keep you breast pump clean, the employed breastfeeding mother and paced bottle feeding handouts  Booklet included Breastfeeding Resources for after discharge including access to the number for the [facility]  Discussed s/s engorgement, blocked milk ducts, and mastitis  Discussed how to remedy at home and when to contact physician  Encouraged parents to call for assistance, questions, and concerns about breastfeeding  Extension provided      Ryan Troy RN 2021 11:40 AM

## 2021-01-26 NOTE — PLAN OF CARE
Problem: POSTPARTUM  Goal: Experiences normal postpartum course  Description: INTERVENTIONS:  - Monitor maternal vital signs  - Assess uterine involution and lochia  2021 1304 by Kimmie Lowery RN  Outcome: Completed  2021 101 by Kimmie Lowery RN  Outcome: Adequate for Discharge  Goal: Appropriate maternal -  bonding  Description: INTERVENTIONS:  - Identify family support  - Assess for appropriate maternal/infant bonding   -Encourage maternal/infant bonding opportunities  - Referral to  or  as needed  2021 1304 by iKmmie Lowery RN  Outcome: Completed  2021 1015 by Kimmie Lowery RN  Outcome: Adequate for Discharge  Goal: Establishment of infant feeding pattern  Description: INTERVENTIONS:  - Assess breast/bottle feeding  - Refer to lactation as needed  2021 1304 by Kimmie Lowery RN  Outcome: Completed  2021 by Kimmie Lowery RN  Outcome: Adequate for Discharge  Goal: Incision(s), wounds(s) or drain site(s) healing without S/S of infection  Description: INTERVENTIONS  - Assess and document risk factors for skin impairment   - Assess and document dressing, incision, wound bed, drain sites and surrounding tissue  - Consider nutrition services referral as needed  - Oral mucous membranes remain intact  - Provide patient/ family education  2021 1304 by Kimmie Lowery RN  Outcome: Completed  2021 by Kimmie Lowery RN  Outcome: Adequate for Discharge

## 2021-01-31 LAB — PLACENTA IN STORAGE: NORMAL

## 2021-02-02 NOTE — UTILIZATION REVIEW
Notification of Maternity/Delivery & [place] Birth Information for Admission   Notification of Maternity/Delivery for Admission to our facility [facility]  Be advised that this patient was admitted to our facility under Inpatient Status  Contact Holliheatherkinza Luis F at [phone] for additional admission information  [facility] PARENT[facility]CHILD HEALTH [facility] DEPT DEDICATED [place] [phone]  Mother &  Information   Patient Name: Joshua Branch   YOB: 1995   Delivering clinician:    OB History        1    Para   1    Term   1       0    AB   0    Living   1       SAB   0    TAB   0    Ectopic   0    Multiple   0    Live Births   1           Obstetric Comments   Menarche: 16            Name & MRN:   Information for the patient's :  Cortney Newman [[id number]]     [place] Delivery Information:  Sex: male  Delivered 2021 9:00 AM by , Low Transverse; Gestational Age: 38w7d    [place] Measurements:  Weight: 6 lb 0 7 oz (2740 g); Height: 19"    APGAR 1 minute 5 minutes 10 minutes   Totals: 9 9       Birth Information: 26 y o  female MRN: [id number] Unit/Bed#: -01 Estimated Date of Delivery: 21  Birthweight: No birth weight on file   Gestational Age: <None> Delivery Type: , Low Transverse      Authorization Information   Servicing Facility:   [place] [address]  Tax ID: [id number]  NPI: [id number] Attending Provider/NPI:  Phone:  Address: Favian Thakur [[id number]]  [phone]  Same as FARHAN/Alma Hendrickson 1106 of Service Code:  24 Place of Service Name: [address] [place] [address]   Start Date: 21 7891   Discharge Date & Time: 2021  1:20 PM    Type of Admission: Inpatient Status Discharge Disposition (if discharged): Home/Self Care   Patient Diagnoses:   38 weeks gestation of pregnancy [Z3A 38]  Encounter for  delivery without indication [O82]  The primary encounter diagnosis was 38 weeks gestation of pregnancy  Diagnoses of Hip osteoarthritis, Premature labor in third trimester, and S/P  section were also pertinent to this visit  1  38 weeks gestation of pregnancy    2  Hip osteoarthritis    3  Premature labor in third trimester    4  S/P  section       Orders: Admission Orders (From admission, onward)     Ordered        21 0634  INPATIENT ADMISSION  Once                    Assigned Utilization Review Contact: Bo Carrillo  Utilization   Network Utilization Review Department  Phone: [phone]; Fax [phone]  Email: [email or web address]

## 2021-02-15 ENCOUNTER — POSTPARTUM VISIT (OUTPATIENT)
Dept: OBGYN CLINIC | Facility: CLINIC | Age: 26
End: 2021-02-15

## 2021-02-15 VITALS — DIASTOLIC BLOOD PRESSURE: 64 MMHG | BODY MASS INDEX: 22.83 KG/M2 | WEIGHT: 137.2 LBS | SYSTOLIC BLOOD PRESSURE: 112 MMHG

## 2021-02-15 PROBLEM — O36.5990 PREGNANCY AFFECTED BY FETAL GROWTH RESTRICTION: Status: RESOLVED | Noted: 2021-01-06 | Resolved: 2021-02-15

## 2021-02-15 PROBLEM — O98.513 COVID-19 AFFECTING PREGNANCY IN THIRD TRIMESTER: Status: RESOLVED | Noted: 2020-12-09 | Resolved: 2021-02-15

## 2021-02-15 PROBLEM — Z98.891 S/P CESAREAN SECTION: Status: RESOLVED | Noted: 2020-08-26 | Resolved: 2021-02-15

## 2021-02-15 PROBLEM — U07.1 COVID-19 AFFECTING PREGNANCY IN THIRD TRIMESTER: Status: RESOLVED | Noted: 2020-12-09 | Resolved: 2021-02-15

## 2021-02-15 PROCEDURE — 99024 POSTOP FOLLOW-UP VISIT: CPT | Performed by: OBSTETRICS & GYNECOLOGY

## 2021-02-15 NOTE — PROGRESS NOTES
Assessment/Plan     Normal postpartum exam      1  Contraception: abstinence and condoms  2  Annual exam due in May  Pap due   3  Lactation consult, [address] information discussed  4  Increase activity as tolerated, may resume all normal activity  5  Anticipated return to work: 6 - 12 weeks post partum  6  PPD: Referral placed to Jeff Laser  Pt declined medications at this time  F/up 4 wks  Reviewed warning signs/symptoms  Subjective     Myrtis Lundborg is a 26 y o  female who presents for a postpartum visit  She is 3 weeks postpartum following a primary  section, low transverse incision  I have fully reviewed the prenatal and intrapartum course  The delivery was at 45 gestational weeks  Anesthesia: spinal  Laceration: n/a  Bleeding no bleeding  Bowel function is normal  Bladder function is normal though feels somewhat more relaxed  Patient has not been sexually active  Desired contraception method is abstinence and condoms  Postpartum depression screening: positive  EPDS : 17  Denies SI/HI  States she is just tired and feels overwhelmed because her partner has returned to work and she is alone at home  Feels safe at home and with baby  Thinks she probably had an underlying anxiety disorder that is exacerbated by lack of sleep  Baby's course has been uncomplicated     Baby is feeding by breast     Last Pap : 2020 ; no abnormalities  Gestational Diabetes: no  Pregnancy Complications: none    The following portions of the patient's history were reviewed and updated as appropriate: allergies, current medications, past family history, past medical history, past social history, past surgical history and problem list       Current Outpatient Medications:     acetaminophen (TYLENOL) 325 mg tablet, Take 2 tablets (650 mg total) by mouth every 4 (four) hours, Disp: 90 tablet, Rfl: 3    docusate sodium (COLACE) 100 mg capsule, Take 1 capsule (100 mg total) by mouth 2 (two) times a day, Disp: 10 capsule, Rfl: 0    Prenatal MV & Min w/FA-DHA (PRENATAL ADULT GUMMY/DHA/FA) 0 4-25 MG CHEW, Chew 2 tablets daily, Disp: , Rfl:     sulfaSALAzine (AZULFIDINE) 500 mg tablet, Take 2 tablets (1,000 mg total) by mouth 2 (two) times a day Total of 4 tabs in a day, Disp: 120 tablet, Rfl: 6    No Known Allergies    Review of Systems  Constitutional: no fever, feels well  Breasts: no complaints of breast pain, breast lump, or nipple discharge  Gastrointestinal: no complaints nausea, vomiting  Genitourinary: as noted in HPI    Neurological: no complaints of headache      Objective      /64 (BP Location: Right arm, Patient Position: Sitting, Cuff Size: Standard)   Wt 62 2 kg (137 lb 3 2 oz)   LMP 04/27/2020 (Exact Date)   Breastfeeding Yes   BMI 22 83 kg/m²     OBGyn Exam  General: alert and oriented, in no acute distress  Abdomen: soft, non-tender, without masses or organomegaly  Incision: clean, dry, and intact and healing well

## 2021-03-02 ENCOUNTER — OFFICE VISIT (OUTPATIENT)
Dept: OBGYN CLINIC | Facility: HOSPITAL | Age: 26
End: 2021-03-02
Payer: COMMERCIAL

## 2021-03-02 VITALS
HEART RATE: 81 BPM | BODY MASS INDEX: 22.49 KG/M2 | WEIGHT: 135 LBS | SYSTOLIC BLOOD PRESSURE: 140 MMHG | HEIGHT: 65 IN | DIASTOLIC BLOOD PRESSURE: 80 MMHG

## 2021-03-02 DIAGNOSIS — M16.11 PRIMARY OSTEOARTHRITIS OF ONE HIP, RIGHT: Primary | ICD-10-CM

## 2021-03-02 PROCEDURE — 99213 OFFICE O/P EST LOW 20 MIN: CPT | Performed by: ORTHOPAEDIC SURGERY

## 2021-03-02 NOTE — PROGRESS NOTES
Assessment:  1  Primary osteoarthritis of one hip, right         Plan:    ·  Patient has severe right hip osteoarthritis   · Weight-bearing as tolerated right lower extremity   · Patient is considering having total hip arthroplasty and fall   · We will need to obtain new x-rays at next office visit   · Patient will contact her OBGYN if she is allowed to use Voltaren gel  · Follow-up in six months        The above stated was discussed in layman's terms and the patient expressed understanding  All questions were answered to the patient's satisfaction  Subjective:   Digna Saunders is a 26 y o  female who presents today follow-up for  Right hip pain due to osteoarthritis  Patient states her hip pain is been getting worse since delivering her son in January  She is having constant pain over the posterior, lateral and radiating to the right groin  She does state occasional radiating pain down to the mid thigh region  She denies any numbness or tingling  Patient has done had physical therapy in the past with no relief  Patient also has had a right hip steroid injections in the past with no relief  Review of systems negative unless otherwise specified in HPI    Past Medical History:   Diagnosis Date    Arthritis     right hip    History of transfusion     unsure - could have been with surgery on back    Knee effusion, left     Need for HPV vaccination     completed series    Scoliosis     Varicella     vaccine    Varicella vaccination        Past Surgical History:   Procedure Laterality Date    HIP SURGERY Right     removal of benign bony growth    MT  DELIVERY ONLY N/A 2021    Procedure:  SECTION ();   Surgeon: Yajaira Lozano DO;  Location: [facility] ;  Service: Obstetrics    SPINAL FUSION         Family History   Problem Relation Age of Onset    Psoriasis Mother     Diabetes Mother     Thyroid disease Mother     Hypertension Father     Diabetes Father     No Known Problems Brother     Diabetes Maternal Grandmother     Emphysema Maternal Grandfather     No Known Problems Paternal Grandmother     Vision loss Paternal Grandfather     Hearing loss Paternal Grandfather     No Known Problems Half-Sister     No Known Problems Half-Brother     Breast cancer Neg Hx     Ovarian cancer Neg Hx     Colon cancer Neg Hx        Social History     Occupational History    Occupation: Nutrition Services   Tobacco Use    Smoking status: Former Smoker     Quit date: 2020     Years since quittin 8    Smokeless tobacco: Never Used   Substance and Sexual Activity    Alcohol use: Not Currently     Frequency: Monthly or less     Comment: rarely prior to confirmed pregnancy    Drug use: Not Currently     Types: Marijuana     Comment: last used 2020    Sexual activity: Yes     Partners: Male     Birth control/protection: Condom     Comment: lifetime partners: 11; current partner: 2015         Current Outpatient Medications:     Prenatal MV & Min w/FA-DHA (PRENATAL ADULT GUMMY/DHA/FA) 0 4-25 MG CHEW, Chew 2 tablets daily, Disp: , Rfl:     sulfaSALAzine (AZULFIDINE) 500 mg tablet, Take 2 tablets (1,000 mg total) by mouth 2 (two) times a day Total of 4 tabs in a day, Disp: 120 tablet, Rfl: 6    acetaminophen (TYLENOL) 325 mg tablet, Take 2 tablets (650 mg total) by mouth every 4 (four) hours (Patient not taking: Reported on 3/2/2021), Disp: 90 tablet, Rfl: 3    docusate sodium (COLACE) 100 mg capsule, Take 1 capsule (100 mg total) by mouth 2 (two) times a day (Patient not taking: Reported on 3/2/2021), Disp: 10 capsule, Rfl: 0    No Known Allergies         Vitals:    21 0923   BP: 140/80   Pulse: 81       Objective:            Physical Exam  · General: Awake, Alert, Oriented  · Eyes: Pupils equal, round and reactive to light  · Heart: regular rate and rhythm  · Lungs: No audible wheezing  · Abdomen: soft                    Ortho Exam    Right hip   No lacerations, no abrasions, no open wounds  No erythema  For flexion 100°   No internal rotation   Minimal external rotation    Abduction 5-10 degrees  Neurovascularly Intact Distally     Diagnostics, reviewed and taken today if performed as documented:    None performed      Procedures, if performed today:    Procedures    None performed      Scribe Attestation    I,:  Jennifer Joce Baca am acting as a scribe while in the presence of the attending physician :       I,:  Donal Bence, MD personally performed the services described in this documentation    as scribed in my presence :             Portions of the record may have been created with voice recognition software  Occasional wrong word or "sound a like" substitutions may have occurred due to the inherent limitations of voice recognition software  Read the chart carefully and recognize, using context, where substitutions have occurred

## 2021-03-08 ENCOUNTER — OFFICE VISIT (OUTPATIENT)
Dept: FAMILY MEDICINE CLINIC | Facility: CLINIC | Age: 26
End: 2021-03-08
Payer: COMMERCIAL

## 2021-03-08 VITALS
TEMPERATURE: 97.6 F | DIASTOLIC BLOOD PRESSURE: 70 MMHG | OXYGEN SATURATION: 98 % | SYSTOLIC BLOOD PRESSURE: 108 MMHG | BODY MASS INDEX: 22.02 KG/M2 | HEIGHT: 66 IN | WEIGHT: 137 LBS | RESPIRATION RATE: 16 BRPM | HEART RATE: 97 BPM

## 2021-03-08 DIAGNOSIS — Z00.00 HEALTHCARE MAINTENANCE: Primary | ICD-10-CM

## 2021-03-08 PROCEDURE — 99395 PREV VISIT EST AGE 18-39: CPT | Performed by: FAMILY MEDICINE

## 2021-03-10 ENCOUNTER — APPOINTMENT (OUTPATIENT)
Dept: LAB | Facility: MEDICAL CENTER | Age: 26
End: 2021-03-10
Payer: COMMERCIAL

## 2021-03-10 DIAGNOSIS — Z00.00 HEALTHCARE MAINTENANCE: ICD-10-CM

## 2021-03-10 LAB
HBV SURFACE AB SER-ACNC: <3.1 MIU/ML
RUBV IGG SERPL IA-ACNC: 115 IU/ML

## 2021-03-10 PROCEDURE — 36415 COLL VENOUS BLD VENIPUNCTURE: CPT

## 2021-03-10 PROCEDURE — 86706 HEP B SURFACE ANTIBODY: CPT

## 2021-03-10 PROCEDURE — 86735 MUMPS ANTIBODY: CPT

## 2021-03-10 PROCEDURE — 86480 TB TEST CELL IMMUN MEASURE: CPT

## 2021-03-10 PROCEDURE — 86787 VARICELLA-ZOSTER ANTIBODY: CPT

## 2021-03-10 PROCEDURE — 86765 RUBEOLA ANTIBODY: CPT

## 2021-03-10 PROCEDURE — 86762 RUBELLA ANTIBODY: CPT

## 2021-03-11 LAB
MEV IGG SER QL: NORMAL
MUV IGG SER QL: NORMAL
VZV IGG SER IA-ACNC: ABNORMAL

## 2021-03-12 LAB
GAMMA INTERFERON BACKGROUND BLD IA-ACNC: 0.03 IU/ML
M TB IFN-G BLD-IMP: NEGATIVE
M TB IFN-G CD4+ BCKGRND COR BLD-ACNC: 0 IU/ML
M TB IFN-G CD4+ BCKGRND COR BLD-ACNC: 0 IU/ML
MITOGEN IGNF BCKGRD COR BLD-ACNC: 2.43 IU/ML

## 2021-03-14 NOTE — PROGRESS NOTES
Assessment/Plan:  Physical completed  Forms will be completed  Patient needs immunity testing for varicella hepatitis-B measles mumps rubella  Also QuantiFERON gold  Forms will be ready once testing is back  Patient is otherwise healthy except for some reactive arthritis  Will follow up with Rheumatology a    Health Maintenance  Lifestyle Advice: continue current healthy lifestyle patterns and return for routine annual checkups  Diet: well balanced diet  Exercise: infrequently  Dental: regular dental visits  Vision: wears glasses  Preventative Health: Female Preventative: Last PAP was 7-29-20    No problem-specific Assessment & Plan notes found for this encounter  Diagnoses and all orders for this visit:    Healthcare maintenance  -     Varicella zoster antibody, IgG; Future  -     Hepatitis B surface antibody; Future  -     Measles/Mumps/Rubella Immunity; Future  -     Quantiferon TB Gold Plus; Future                  Subjective:      Patient ID: Thao Damico is a 26 y o  female  Patient here to have physical forms completed  She is going to school for medical assistant  Overall doing well  No complaint  Just had a baby        The following portions of the patient's history were reviewed and updated as appropriate: allergies, current medications, past family history, past medical history, past social history, past surgical history and problem list     Review of Systems   Constitutional: Negative for appetite change, fatigue, fever and unexpected weight change  HENT: Negative for congestion, ear pain, postnasal drip, rhinorrhea, sinus pressure, sinus pain and sore throat  Eyes: Negative for redness and visual disturbance  Respiratory: Negative for chest tightness and shortness of breath  Cardiovascular: Negative for chest pain, palpitations and leg swelling  Gastrointestinal: Negative for abdominal distention, abdominal pain, diarrhea and nausea     Endocrine: Negative for cold intolerance and heat intolerance  Genitourinary: Negative for dysuria and hematuria  Musculoskeletal: Negative for arthralgias, gait problem and myalgias  Skin: Negative for pallor and rash  Neurological: Negative for dizziness and headaches  Psychiatric/Behavioral: Negative for behavioral problems  The patient is not nervous/anxious  Objective:    /70   Pulse 97   Temp 97 6 °F (36 4 °C) (Temporal)   Resp 16   Ht 5' 6" (1 676 m)   Wt 62 1 kg (137 lb)   SpO2 98%   BMI 22 11 kg/m²          Physical Exam  Vitals signs and nursing note reviewed  Constitutional:       General: She is not in acute distress  Appearance: She is not diaphoretic  HENT:      Head: Normocephalic and atraumatic  Eyes:      General: No scleral icterus  Conjunctiva/sclera: Conjunctivae normal       Pupils: Pupils are equal, round, and reactive to light  Neck:      Musculoskeletal: Normal range of motion and neck supple  Thyroid: No thyromegaly  Cardiovascular:      Rate and Rhythm: Normal rate and regular rhythm  Pulses:           Carotid pulses are 0 on the right side and 0 on the left side  Heart sounds: Normal heart sounds  No murmur  Pulmonary:      Effort: Pulmonary effort is normal  No respiratory distress  Breath sounds: Normal breath sounds  No wheezing  Abdominal:      General: There is no distension  Palpations: Abdomen is soft  Musculoskeletal:      Right lower leg: No edema  Left lower leg: No edema  Lymphadenopathy:      Cervical: No cervical adenopathy  Skin:     General: Skin is warm  Coloration: Skin is not pale  Neurological:      Mental Status: She is alert and oriented to person, place, and time  Cranial Nerves: No cranial nerve deficit  Deep Tendon Reflexes: Reflexes are normal and symmetric  Psychiatric:         Behavior: Behavior normal          Thought Content:  Thought content normal          Judgment: Judgment normal  Patient has no known allergies  Alexis Conway had no medications administered during this visit    Health Maintenance   Topic Date Due    HPV Vaccine (2 - 3-dose series) 2012    Annual Physical  2014    Chlamydia Screening  2021    Depression Screening PHQ  2022    BMI: Adult  2022    Cervical Cancer Screening  2023    DTaP,Tdap,and Td Vaccines (7 - Td) 2030    HIV Screening  Completed    Hepatitis C Screening  Completed    HIB Vaccine  Completed    Hepatitis B Vaccine  Completed    IPV Vaccine  Completed    Influenza Vaccine  Completed    Pneumococcal Vaccine: Pediatrics (0 to 5 Years) and At-Risk Patients (6 to 59 Years)  Aged Out    Hepatitis A Vaccine  Aged Out    Meningococcal ACWY Vaccine  Aged Out      Social History     Socioeconomic History    Marital status: Single     Spouse name: Dain Huerta    Number of children: 0    Years of education: currently in college    Highest education level: High school graduate   Occupational History    Occupation: Nutrition Services   Social Needs    Financial resource strain: Not on file    Food insecurity     Worry: Not on file     Inability: Not on file   Somewhere needs     Medical: Not on file     Non-medical: Not on file   Tobacco Use    Smoking status: Former Smoker     Quit date: 2020     Years since quittin 8    Smokeless tobacco: Never Used   Substance and Sexual Activity    Alcohol use: Not Currently     Frequency: Monthly or less     Comment: rarely prior to confirmed pregnancy    Drug use: Not Currently     Types: Marijuana     Comment: last used 2020    Sexual activity: Yes     Partners: Male     Birth control/protection: Condom     Comment: lifetime partners: 5; current partner:    Lifestyle    Physical activity     Days per week: Not on file     Minutes per session: Not on file    Stress: Not on file   Relationships    Social connections Talks on phone: Not on file     Gets together: Not on file     Attends Anabaptist service: Not on file     Active member of club or organization: Not on file     Attends meetings of clubs or organizations: Not on file     Relationship status: Not on file    Intimate partner violence     Fear of current or ex partner: Not on file     Emotionally abused: Not on file     Physically abused: Not on file     Forced sexual activity: Not on file   Other Topics Concern    Not on file   Social History Narrative    Quaker: No preference    Accepts blood products      Family History   Problem Relation Age of Onset    Psoriasis Mother     Diabetes Mother     Thyroid disease Mother     Hypertension Father     Diabetes Father     No Known Problems Brother     Diabetes Maternal Grandmother     Emphysema Maternal Grandfather     No Known Problems Paternal Grandmother     Vision loss Paternal Grandfather     Hearing loss Paternal Grandfather     No Known Problems Half-Sister     No Known Problems Half-Brother     Breast cancer Neg Hx     Ovarian cancer Neg Hx     Colon cancer Neg Hx       Past Medical History:   Diagnosis Date    Arthritis     right hip    History of transfusion     unsure - could have been with surgery on back    Knee effusion, left     Need for HPV vaccination     completed series    Scoliosis     Varicella     vaccine    Varicella vaccination       has a past surgical history that includes Spinal fusion (); Hip surgery (Right); and pr  delivery only (N/A, 2021)     Patient Active Problem List    Diagnosis Date Noted    Primary osteoarthritis of right hip 2021    Inflammatory arthritis 10/06/2020    Reactive arthritis ([facility] [address] ) 10/06/2020    Chronic right hip pain 2019       Current Outpatient Medications:     Prenatal MV & Min w/FA-DHA (PRENATAL ADULT GUMMY/DHA/FA) 0 4-25 MG CHEW, Chew 2 tablets daily, Disp: , Rfl:     sulfaSALAzine (AZULFIDINE) 500 mg tablet, Take 2 tablets (1,000 mg total) by mouth 2 (two) times a day Total of 4 tabs in a day, Disp: 120 tablet, Rfl: 6    acetaminophen (TYLENOL) 325 mg tablet, Take 2 tablets (650 mg total) by mouth every 4 (four) hours (Patient not taking: Reported on 3/2/2021), Disp: 90 tablet, Rfl: 3    docusate sodium (COLACE) 100 mg capsule, Take 1 capsule (100 mg total) by mouth 2 (two) times a day (Patient not taking: Reported on 3/2/2021), Disp: 10 capsule, Rfl: 0

## 2021-03-15 ENCOUNTER — TELEPHONE (OUTPATIENT)
Dept: FAMILY MEDICINE CLINIC | Facility: CLINIC | Age: 26
End: 2021-03-15

## 2021-03-15 NOTE — TELEPHONE ENCOUNTER
Deborah Lyons called today- she is asking if you finished her paperwork for school for [employer]  It is due today as soon as possible  I do not see it in the chart

## 2021-03-15 NOTE — TELEPHONE ENCOUNTER
Please let the patient know that her test results came back  She is not immune to hepatitis therefore needs a 4th dose of hepatitis B and they are recommending a repeat of the chickenpox titer in 2 weeks  She is welcome to  her paperwork today or maybe it can be emailed to her    See which she would like to do

## 2021-03-17 NOTE — TELEPHONE ENCOUNTER
Patient called back and I asked her the question, and I did get paperwork completed by Dr Gael Duran and patient picked up

## 2021-03-22 ENCOUNTER — TELEPHONE (OUTPATIENT)
Dept: OBGYN CLINIC | Facility: HOSPITAL | Age: 26
End: 2021-03-22

## 2021-03-22 NOTE — TELEPHONE ENCOUNTER
Dr Irene Marin  Re: Medical records   #  :Carlos REAL/ Brian Tracy  [place] disability is following up on a medical records request that was mailed to the [place] office in February      [facility] states she spoke to [facility] who states they have not received the request

## 2021-03-23 ENCOUNTER — CLINICAL SUPPORT (OUTPATIENT)
Dept: FAMILY MEDICINE CLINIC | Facility: CLINIC | Age: 26
End: 2021-03-23
Payer: COMMERCIAL

## 2021-03-23 DIAGNOSIS — Z23 ENCOUNTER FOR IMMUNIZATION: Primary | ICD-10-CM

## 2021-03-23 PROCEDURE — 90471 IMMUNIZATION ADMIN: CPT

## 2021-03-23 PROCEDURE — 90746 HEPB VACCINE 3 DOSE ADULT IM: CPT

## 2021-07-01 ENCOUNTER — TELEPHONE (OUTPATIENT)
Dept: FAMILY MEDICINE CLINIC | Facility: CLINIC | Age: 26
End: 2021-07-01

## 2021-07-01 NOTE — TELEPHONE ENCOUNTER
Sharmila Leon called the office in regards to she needs to schedule a yearly pe with Dr Hall for a pe employment physical  Pt just had a yearly pe on 03/08/21  Sharmila Leon was informed it is to early and insurance may not cover another physical  Pt is going to be dropping of her paper work for Arian Singh to review to see if she needs a visit, and or if he can fill out paper work based on last exam with date of 03/08/21 or what he recommends  Pt will be stopping this evening

## 2021-07-06 ENCOUNTER — APPOINTMENT (OUTPATIENT)
Dept: URGENT CARE | Facility: CLINIC | Age: 26
End: 2021-07-06

## 2021-07-06 DIAGNOSIS — Z02.1 PRE-EMPLOYMENT HEALTH SCREENING EXAMINATION: Primary | ICD-10-CM

## 2021-07-06 PROCEDURE — 86480 TB TEST CELL IMMUN MEASURE: CPT

## 2021-07-09 ENCOUNTER — TELEPHONE (OUTPATIENT)
Dept: OBGYN CLINIC | Facility: HOSPITAL | Age: 26
End: 2021-07-09

## 2021-07-09 NOTE — TELEPHONE ENCOUNTER
Patient called to check on her medical paperwork  She would like a call from the MA if possible  Patient can be reached at [phone]   Thank you

## 2021-07-12 NOTE — TELEPHONE ENCOUNTER
Called patient and left a voicemail we do have a form uploaded already that is 2 pages, I stated if she received another form we can send it to disability and it does take about 10-14 business days    Thank you

## 2021-07-12 NOTE — TELEPHONE ENCOUNTER
Patient called back stating that shes going to drop off her medical paperwork at the office today          [phone]

## 2021-07-14 LAB
GAMMA INTERFERON BACKGROUND BLD IA-ACNC: 0.03 IU/ML
M TB IFN-G BLD-IMP: NEGATIVE
M TB IFN-G CD4+ BCKGRND COR BLD-ACNC: 0 IU/ML
M TB IFN-G CD4+ BCKGRND COR BLD-ACNC: 0 IU/ML
MITOGEN IGNF BCKGRD COR BLD-ACNC: 9.99 IU/ML

## 2021-07-15 NOTE — TELEPHONE ENCOUNTER
Patient called in to double check we recived the Pre Employment [address]      Please call when completed    C/b # [phone]

## 2021-08-07 ENCOUNTER — IMMUNIZATIONS (OUTPATIENT)
Dept: FAMILY MEDICINE CLINIC | Facility: HOSPITAL | Age: 26
End: 2021-08-07

## 2021-08-07 DIAGNOSIS — Z23 ENCOUNTER FOR IMMUNIZATION: Primary | ICD-10-CM

## 2021-08-07 PROCEDURE — 0001A SARS-COV-2 / COVID-19 MRNA VACCINE (PFIZER-BIONTECH) 30 MCG: CPT

## 2021-08-07 PROCEDURE — 91300 SARS-COV-2 / COVID-19 MRNA VACCINE (PFIZER-BIONTECH) 30 MCG: CPT

## 2021-08-09 ENCOUNTER — OFFICE VISIT (OUTPATIENT)
Dept: RHEUMATOLOGY | Facility: CLINIC | Age: 26
End: 2021-08-09
Payer: COMMERCIAL

## 2021-08-09 VITALS
SYSTOLIC BLOOD PRESSURE: 108 MMHG | WEIGHT: 142.4 LBS | BODY MASS INDEX: 22.88 KG/M2 | HEIGHT: 66 IN | DIASTOLIC BLOOD PRESSURE: 80 MMHG

## 2021-08-09 DIAGNOSIS — M25.562 LEFT KNEE PAIN, UNSPECIFIED CHRONICITY: Primary | ICD-10-CM

## 2021-08-09 DIAGNOSIS — M25.462 EFFUSION OF LEFT KNEE: ICD-10-CM

## 2021-08-09 DIAGNOSIS — M19.90 INFLAMMATORY ARTHRITIS: ICD-10-CM

## 2021-08-09 PROCEDURE — 99214 OFFICE O/P EST MOD 30 MIN: CPT | Performed by: INTERNAL MEDICINE

## 2021-08-09 RX ORDER — SULFASALAZINE 500 MG/1
1000 TABLET ORAL 2 TIMES DAILY
Qty: 360 TABLET | Refills: 1 | Status: SHIPPED | OUTPATIENT
Start: 2021-08-09 | End: 2022-02-22 | Stop reason: SDUPTHER

## 2021-08-09 NOTE — PROGRESS NOTES
Assessment and Plan: Spenser Hinojosa is a 23 y o  female who presents for follow-up of her reactive arthritis  Has had her baby already  Still has intermittent left knee pain and swelling, but it is not too bothersome  Continue diclofenac gel as needed for joint pain  Continue sulfasalazine 2 tabs twice a day  Do labs    Return to clinic in 6 months    Plan:  Diagnoses and all orders for this visit:    Left knee pain, unspecified chronicity  -     Diclofenac Sodium (VOLTAREN) 1 %; Apply 2 g topically 4 (four) times a day  -     CBC and differential  -     Comprehensive metabolic panel  -     Sedimentation rate, automated  -     C-reactive protein    Effusion of left knee  -     Diclofenac Sodium (VOLTAREN) 1 %; Apply 2 g topically 4 (four) times a day    Inflammatory arthritis  -     sulfaSALAzine (AZULFIDINE) 500 mg tablet; Take 2 tablets (1,000 mg total) by mouth 2 (two) times a day Total of 4 tabs in a day  -     CBC and differential  -     Comprehensive metabolic panel  -     Sedimentation rate, automated  -     C-reactive protein    Follow-up plan: Return to clinic in 6 months        Rheumatic Disease Summary  Chan Lopez is a 26 y  o  female who originally presented on 8/26/19 as a Rheumatology consult referred by Orthopedics provider Dr Kristel Washington evaluation of inflammatory arthritis found on recent left knee MRI   Patient was determined to have an inflammatory monoarthritis based on physical exam and MRI, possibly secondary to reactive arthritis of unknown etiology; she may have been exposed to an infection while working in the [employer] in [place] back in 2016  Administered a Kenalog steroid injection into her left knee at her initial visit to acutely help with her joint pain and inflammation  Also started patient on DMARD therapy with sulfasalazine 500 mg p o  B i d  for long-term control of her inflammatory arthritis; this is a relatively safe medication that does not require drug toxicity monitoring and is safe in pregnancy if patient decides to get pregnant   While giving sulfasalazine time to take effect, had also prescribed for patient naproxen 500 mg p  O  B i d  with meals to take as needed  Have ordered a rheumatoid factor and anti-CCP to evaluate for underlying rheumatoid arthritis      She presented for follow-up on 10/21/19 for her 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, which had been under control s/p intra-articular steroid injection at her prior clinic visit  Highest on differential for patient her age to have an inflammatory monoarthritis is reactive arthritis  Patient was asked to restart sulfasalazine 500mg po bid for more long-term management of her inflammatory arthritis; may be able to discontinue it in 6 months if she remains symptom-free  She can continue taking naproxen 500mg po bid as needed, which patient had not been requiring much  Prescribed amoxicillin 500mg po q8 hours for 7 days to treat patient's group B strep UTI       Next clinic visit was 6/30/20  Her reactive arthritis seemed to be active again involving her left knee since stopping sulfasalazine in May due to finding out about being pregnant  Assured patient that it is safe to take sulfasalazine in pregnancy, and asked her to restart it at 500mg po bid  Also prescribed diclofenac gel as needed for her joint pain instead of oral NSAIDs due to pregnancy      She then presented 8/18/20 for follow-up of reactive arthritis, with her left knee being especially active  Was successfully administered a left knee steroid injection in clinic  Patient was to continue her bid sulfasalazine and diclofenac gel as needed for joint pain      HPI  Cheo Hollingsworth is a 23 y o   female who presents for follow-up of reactive arthritis  He left knee pain and swelling has significantly improved s/p steroid injection on 8/18/20  She is currently an MA student at [employer]   Last workday at [employer] was 8/19/20  She has only been taking 1 tab of SSZ twice a day (500mg po bid)  The following portions of the patient's history were reviewed and updated as appropriate: allergies, current medications, past family history, past medical history, past social history, past surgical history and problem list     Review of Systems   Constitutional: Negative for chills, fatigue, fever and unexpected weight change  HENT: Negative for mouth sores and trouble swallowing  Eyes: Negative for pain and visual disturbance  Respiratory: Negative for cough and shortness of breath  Cardiovascular: Negative for chest pain and leg swelling  Gastrointestinal: Negative for constipation and diarrhea  Indigestion/heartburn   Musculoskeletal: Positive for arthralgias, joint swelling and myalgias  Negative for back pain  Skin: Negative for color change and rash  Neurological: Negative for weakness  Hematological: Negative for adenopathy  Psychiatric/Behavioral: Negative for sleep disturbance       Home Medications:    Current Outpatient Medications:     sulfaSALAzine (AZULFIDINE) 500 mg tablet, Take 2 tablets (1,000 mg total) by mouth 2 (two) times a day Total of 4 tabs in a day, Disp: 360 tablet, Rfl: 1    acetaminophen (TYLENOL) 325 mg tablet, Take 2 tablets (650 mg total) by mouth every 4 (four) hours (Patient not taking: Reported on 3/2/2021), Disp: 90 tablet, Rfl: 3    Diclofenac Sodium (VOLTAREN) 1 %, Apply 2 g topically 4 (four) times a day, Disp: 600 g, Rfl: 1    docusate sodium (COLACE) 100 mg capsule, Take 1 capsule (100 mg total) by mouth 2 (two) times a day (Patient not taking: Reported on 3/2/2021), Disp: 10 capsule, Rfl: 0    Prenatal MV & Min w/FA-DHA (PRENATAL ADULT GUMMY/DHA/FA) 0 4-25 MG CHEW, Chew 2 tablets daily (Patient not taking: Reported on 8/9/2021), Disp: , Rfl:     Objective:    Vitals:    08/09/21 1046   BP: 108/80   BP Location: Right arm   Patient Position: Sitting   Cuff Size: Standard   Weight: 64 6 kg (142 lb 6 4 oz)   Height: 5' 6" (1 676 m)       Physical Exam  Constitutional:       General: She is not in acute distress  Appearance: She is well-developed  HENT:      Head: Normocephalic and atraumatic  Eyes:      General: Lids are normal  No scleral icterus  Conjunctiva/sclera: Conjunctivae normal    Neck:      Musculoskeletal: Neck supple  Pulmonary:      Effort: Pulmonary effort is normal  No tachypnea, accessory muscle usage or respiratory distress  Musculoskeletal:         General: Swelling present  Comments: Left knee swelling   Skin:     General: Skin is dry  Findings: No rash  Neurological:      Mental Status: She is alert  Psychiatric:         Behavior: Behavior normal  Behavior is cooperative  Reviewed labs and imaging  Imaging:   Left Knee MRI w/o contrast 7/22/19  IMPRESSION:  Given the recent aseptic joint aspiration and prior similar MRI findings in 2016, the current findings are most consistent with active inflammatory arthritis with articular synovitis and two areas of marginal osteitis (pre-erosive disease) at the posterior weightbearing aspect of the medial femoral condyle and posterior aspect of the lateral tibial plateau       Labs:   Appointment on 07/06/2021   Component Date Value Ref Range Status    QFT Nil 07/06/2021 0 03  0 - 8 0 IU/ml Final    QFT TB1-NIL 07/06/2021 0 00  IU/ml Final    QFT TB2-NIL 07/06/2021 0 00  IU/ml Final    QFT Mitogen-NIL 07/06/2021 9 99  IU/ml Final    QFT Final Interpretation 07/06/2021 Negative  Negative Final    No Interferon-gamma response to M  tuberculosis antigens detected  Infection with M  tuberculosis is unlikely  A single negative result does not exclude infection with M  tuberculosis   In patients at high risk for M  tuberculosis infection, a second test should be considered in accordance with the 2017 ATS/IDSA/CDC Clinical Practice Guidelines for Diagnosis of Tuberculosis in Adults and Children  False negative results can be a result of incorrect blood sample collection or handling of the specimen affecting lymphocyte function  Appointment on 03/10/2021   Component Date Value Ref Range Status    Varicella IgG 03/10/2021 Equivocal suggest repeat in 2-4 weeks  * IMMUNE Final    Hep B S Ab 03/10/2021 <3 10  mIU/mL Final    Protective Immunity: Hep B Surface Antibody >= 10 mIu/ml (Traceable to [facility] International Reference Preparation)    QFT Nil 03/10/2021 0 03  0 - 8 0 IU/ml Final    QFT TB1-NIL 03/10/2021 0 00  IU/ml Final    QFT TB2-NIL 03/10/2021 0 00  IU/ml Final    QFT Mitogen-NIL 03/10/2021 2 43  IU/ml Final    QFT Final Interpretation 03/10/2021 Negative  Negative Final    No Interferon-gamma response to M  tuberculosis antigens detected  Infection with M  tuberculosis is unlikely  A single negative result does not exclude infection with M  tuberculosis  In patients at high risk for M  tuberculosis infection, a second test should be considered in accordance with the 2017 ATS/IDSA/CDC Clinical Practice Guidelines for Diagnosis of Tuberculosis in Adults and Children  False negative results can be a result of incorrect blood sample collection or handling of the specimen affecting lymphocyte function   Rubeola IgG 03/10/2021 IMMUNE  IMMUNE Final    Mumps IgG 03/10/2021 IMMUNE  IMMUNE Final    IMMUNE - Presumed immune to mumps infection      Rubella IgG Quant 03/10/2021 115 0  >9 9 IU/mL Final

## 2021-08-09 NOTE — PATIENT INSTRUCTIONS
Continue diclofenac gel as needed for joint pain  Continue sulfasalazine 2 tabs twice a day  Do labs    Return to clinic in 6 months    Reactive Arthritis    What is reactive arthritis? -- Reactive arthritis is a kind of arthritis that happens after certain infections  It causes pain and swelling in joints  Reactive arthritis usually affects people who have or just had:  ?Food poisoning or another kind of infection of the intestines  ? An infection that you catch through sex   In the past, reactive arthritis was sometimes called "Cornelio syndrome "  What are the symptoms of reactive arthritis? -- The main symptoms of reactive arthritis are pain and swelling in the joints  These usually happen 1 to 4 weeks after an infection  In most cases, the symptoms affect only a few joints, usually in the knees, ankles, or feet  Other symptoms might include:  ?Pain in the tendons in the feet and ankles (tendons are tough bands of tissue that connect muscles to bones)  ? Irritation of the eye called "conjunctivitis" (also known as pink eye)  ? Pain when urinating  Is there a test for reactive arthritis? -- No  There is no test  But if your doctor or nurse can figure out what type of germ caused your infection, he or she should be able to tell if you have reactive arthritis  Your doctor or nurse can test your stool (bowel movements) or urine to look for certain kinds of germs  If your doctor or nurse can't tell what germs caused your infection, he or she will study your symptoms to decide how likely it is that you have reactive arthritis  How is reactive arthritis treated? -- The symptoms of reactive arthritis are treated with medicines, including:  ? NSAIDs - This is a large group of medicines that includes ibuprofen (sample brand names: Advil, Motrin) and naproxen (sample brand name: Aleve)  Your doctor or nurse might prescribe a dose higher than you would normally take to relieve pain  ? Other medicines - If NSAIDs do not help your symptoms, your doctor or nurse might give you a shot of steroids  Steroids help reduce inflammation  These are not the same as the steroids some athletes take illegally  Your doctor might also give you more steroids to take at home  There are also other medicines that might help if your symptoms do not get better with NSAIDs or steroids  ? Eye drops - Special drops can help relieve redness and irritation in your eyes  But if you have eye pain or trouble seeing, visit an eye doctor to make sure you don't have a more serious problem  Antibiotics do not usually help with the joint symptoms of reactive arthritis  Even so, your doctor or nurse might prescribe them if you still have an infection  When will I feel better? -- Most people with reactive arthritis get better quickly  Some people continue to notice symptoms, either constantly or just once in a while  If your back gets very stiff and sore, see your doctor or nurse   This might mean that your reactive arthritis has turned into a more serious problem

## 2021-09-07 ENCOUNTER — HOSPITAL ENCOUNTER (OUTPATIENT)
Dept: RADIOLOGY | Facility: HOSPITAL | Age: 26
Discharge: HOME | End: 2021-09-07
Attending: ORTHOPAEDIC SURGERY
Payer: COMMERCIAL

## 2021-09-07 ENCOUNTER — OFFICE VISIT (OUTPATIENT)
Dept: LAB | Facility: HOSPITAL | Age: 26
End: 2021-09-07
Attending: PHYSICIAN ASSISTANT
Payer: COMMERCIAL

## 2021-09-07 ENCOUNTER — APPOINTMENT (OUTPATIENT)
Dept: LAB | Facility: HOSPITAL | Age: 26
End: 2021-09-07
Attending: INTERNAL MEDICINE
Payer: COMMERCIAL

## 2021-09-07 ENCOUNTER — OFFICE VISIT (OUTPATIENT)
Dept: OBGYN CLINIC | Facility: HOSPITAL | Age: 26
End: 2021-09-07
Payer: COMMERCIAL

## 2021-09-07 VITALS
SYSTOLIC BLOOD PRESSURE: 131 MMHG | DIASTOLIC BLOOD PRESSURE: 90 MMHG | HEART RATE: 125 BPM | BODY MASS INDEX: 22.37 KG/M2 | HEIGHT: 66 IN | WEIGHT: 139.2 LBS

## 2021-09-07 DIAGNOSIS — M16.11 PRIMARY OSTEOARTHRITIS OF ONE HIP, RIGHT: ICD-10-CM

## 2021-09-07 DIAGNOSIS — Z01.818 PREOP TESTING: ICD-10-CM

## 2021-09-07 DIAGNOSIS — M16.11 PRIMARY OSTEOARTHRITIS OF ONE HIP, RIGHT: Primary | ICD-10-CM

## 2021-09-07 LAB
ABO GROUP BLD: NORMAL
ALBUMIN SERPL BCP-MCNC: 3.7 G/DL (ref 3.5–5)
ALP SERPL-CCNC: 107 U/L (ref 46–116)
ALT SERPL W P-5'-P-CCNC: 16 U/L (ref 12–78)
ANION GAP SERPL CALCULATED.3IONS-SCNC: 3 MMOL/L (ref 4–13)
APTT PPP: 37 SECONDS (ref 23–37)
AST SERPL W P-5'-P-CCNC: 11 U/L (ref 5–45)
ATRIAL RATE: 104 BPM
BASOPHILS # BLD AUTO: 0.06 THOUSANDS/ΜL (ref 0–0.1)
BASOPHILS NFR BLD AUTO: 1 % (ref 0–1)
BILIRUB SERPL-MCNC: 0.31 MG/DL (ref 0.2–1)
BLD GP AB SCN SERPL QL: NEGATIVE
BUN SERPL-MCNC: 17 MG/DL (ref 5–25)
CALCIUM SERPL-MCNC: 8.7 MG/DL (ref 8.3–10.1)
CHLORIDE SERPL-SCNC: 105 MMOL/L (ref 100–108)
CO2 SERPL-SCNC: 27 MMOL/L (ref 21–32)
CREAT SERPL-MCNC: 0.72 MG/DL (ref 0.6–1.3)
CRP SERPL QL: 64 MG/L
EOSINOPHIL # BLD AUTO: 0.05 THOUSAND/ΜL (ref 0–0.61)
EOSINOPHIL NFR BLD AUTO: 1 % (ref 0–6)
ERYTHROCYTE [DISTWIDTH] IN BLOOD BY AUTOMATED COUNT: 16 % (ref 11.6–15.1)
ERYTHROCYTE [SEDIMENTATION RATE] IN BLOOD: >130 MM/HOUR (ref 0–19)
FERRITIN SERPL-MCNC: 76 NG/ML (ref 8–388)
GFR SERPL CREATININE-BSD FRML MDRD: 117 ML/MIN/1.73SQ M
GLUCOSE P FAST SERPL-MCNC: 67 MG/DL (ref 65–99)
HCT VFR BLD AUTO: 38.6 % (ref 34.8–46.1)
HGB BLD-MCNC: 12 G/DL (ref 11.5–15.4)
IMM GRANULOCYTES # BLD AUTO: 0.02 THOUSAND/UL (ref 0–0.2)
IMM GRANULOCYTES NFR BLD AUTO: 0 % (ref 0–2)
INR PPP: 1.14 (ref 0.84–1.19)
IRON SATN MFR SERPL: 12 % (ref 15–50)
IRON SERPL-MCNC: 32 UG/DL (ref 50–170)
LYMPHOCYTES # BLD AUTO: 1.4 THOUSANDS/ΜL (ref 0.6–4.47)
LYMPHOCYTES NFR BLD AUTO: 17 % (ref 14–44)
MCH RBC QN AUTO: 26.1 PG (ref 26.8–34.3)
MCHC RBC AUTO-ENTMCNC: 31.1 G/DL (ref 31.4–37.4)
MCV RBC AUTO: 84 FL (ref 82–98)
MONOCYTES # BLD AUTO: 0.64 THOUSAND/ΜL (ref 0.17–1.22)
MONOCYTES NFR BLD AUTO: 8 % (ref 4–12)
NEUTROPHILS # BLD AUTO: 5.88 THOUSANDS/ΜL (ref 1.85–7.62)
NEUTS SEG NFR BLD AUTO: 73 % (ref 43–75)
NRBC BLD AUTO-RTO: 0 /100 WBCS
P AXIS: 64 DEGREES
PLATELET # BLD AUTO: 418 THOUSANDS/UL (ref 149–390)
PMV BLD AUTO: 10 FL (ref 8.9–12.7)
POTASSIUM SERPL-SCNC: 3.6 MMOL/L (ref 3.5–5.3)
PR INTERVAL: 126 MS
PROT SERPL-MCNC: 9.5 G/DL (ref 6.4–8.2)
PROTHROMBIN TIME: 14.1 SECONDS (ref 11.6–14.5)
QRS AXIS: 59 DEGREES
QRSD INTERVAL: 82 MS
QT INTERVAL: 370 MS
QTC INTERVAL: 486 MS
RBC # BLD AUTO: 4.59 MILLION/UL (ref 3.81–5.12)
RETICS # AUTO: NORMAL 10*3/UL (ref 14097–95744)
RETICS # CALC: 1.52 % (ref 0.37–1.87)
RH BLD: POSITIVE
SODIUM SERPL-SCNC: 135 MMOL/L (ref 136–145)
SPECIMEN EXPIRATION DATE: NORMAL
T WAVE AXIS: 19 DEGREES
TIBC SERPL-MCNC: 268 UG/DL (ref 250–450)
VENTRICULAR RATE: 104 BPM
WBC # BLD AUTO: 8.05 THOUSAND/UL (ref 4.31–10.16)

## 2021-09-07 PROCEDURE — 86850 RBC ANTIBODY SCREEN: CPT

## 2021-09-07 PROCEDURE — 83550 IRON BINDING TEST: CPT

## 2021-09-07 PROCEDURE — 83540 ASSAY OF IRON: CPT

## 2021-09-07 PROCEDURE — 85025 COMPLETE CBC W/AUTO DIFF WBC: CPT | Performed by: INTERNAL MEDICINE

## 2021-09-07 PROCEDURE — 99214 OFFICE O/P EST MOD 30 MIN: CPT | Performed by: ORTHOPAEDIC SURGERY

## 2021-09-07 PROCEDURE — 73502 X-RAY EXAM HIP UNI 2-3 VIEWS: CPT

## 2021-09-07 PROCEDURE — 86900 BLOOD TYPING SEROLOGIC ABO: CPT

## 2021-09-07 PROCEDURE — 93005 ELECTROCARDIOGRAM TRACING: CPT

## 2021-09-07 PROCEDURE — 86140 C-REACTIVE PROTEIN: CPT | Performed by: INTERNAL MEDICINE

## 2021-09-07 PROCEDURE — 36415 COLL VENOUS BLD VENIPUNCTURE: CPT

## 2021-09-07 PROCEDURE — 82728 ASSAY OF FERRITIN: CPT

## 2021-09-07 PROCEDURE — 80053 COMPREHEN METABOLIC PANEL: CPT | Performed by: INTERNAL MEDICINE

## 2021-09-07 PROCEDURE — 86901 BLOOD TYPING SEROLOGIC RH(D): CPT

## 2021-09-07 PROCEDURE — 93010 ELECTROCARDIOGRAM REPORT: CPT | Performed by: INTERNAL MEDICINE

## 2021-09-07 PROCEDURE — 85610 PROTHROMBIN TIME: CPT

## 2021-09-07 PROCEDURE — 85730 THROMBOPLASTIN TIME PARTIAL: CPT

## 2021-09-07 PROCEDURE — 85045 AUTOMATED RETICULOCYTE COUNT: CPT

## 2021-09-07 PROCEDURE — 85652 RBC SED RATE AUTOMATED: CPT | Performed by: INTERNAL MEDICINE

## 2021-09-07 RX ORDER — ASPIRIN 81 MG/1
81 TABLET ORAL 2 TIMES DAILY
Qty: 70 TABLET | Refills: 0 | Status: SHIPPED | OUTPATIENT
Start: 2021-09-07 | End: 2021-10-26 | Stop reason: HOSPADM

## 2021-09-07 RX ORDER — ASCORBIC ACID 500 MG
500 TABLET ORAL DAILY
Qty: 60 TABLET | Refills: 0 | Status: SHIPPED | OUTPATIENT
Start: 2021-09-07

## 2021-09-07 RX ORDER — FERROUS SULFATE TAB EC 324 MG (65 MG FE EQUIVALENT) 324 (65 FE) MG
324 TABLET DELAYED RESPONSE ORAL
Qty: 60 TABLET | Refills: 0 | Status: SHIPPED | OUTPATIENT
Start: 2021-09-07

## 2021-09-07 RX ORDER — MULTIVITAMIN
1 CAPSULE ORAL DAILY
COMMUNITY

## 2021-09-07 RX ORDER — SODIUM CHLORIDE 9 MG/ML
125 INJECTION, SOLUTION INTRAVENOUS CONTINUOUS
Status: CANCELLED | OUTPATIENT
Start: 2021-09-07

## 2021-09-07 RX ORDER — FOLIC ACID 1 MG/1
1 TABLET ORAL DAILY
Qty: 30 TABLET | Refills: 0 | Status: SHIPPED | OUTPATIENT
Start: 2021-09-07 | End: 2022-04-05

## 2021-09-07 RX ORDER — CHLORHEXIDINE GLUCONATE 4 G/100ML
SOLUTION TOPICAL DAILY PRN
Status: CANCELLED | OUTPATIENT
Start: 2021-09-07

## 2021-09-07 RX ORDER — CEFAZOLIN SODIUM 2 G/50ML
2000 SOLUTION INTRAVENOUS ONCE
Status: CANCELLED | OUTPATIENT
Start: 2021-09-07 | End: 2021-09-07

## 2021-09-07 RX ORDER — ACETAMINOPHEN 325 MG/1
975 TABLET ORAL ONCE
Status: CANCELLED | OUTPATIENT
Start: 2021-09-07 | End: 2021-09-07

## 2021-09-07 NOTE — PROGRESS NOTES
Orthopedics          Dora Westbrook 23 y o  female MRN: [id number]      Chief Complaint:   right hip pain    HPI:   23 y  o female complaining of right hip pain  Patient has been diagnosed with right hip arthritis  Patient follows up with rheumatologist   Patient has had injections past with minimal pain relief  Patient has pain is localized right groin region radiates down her right thigh she states having difficulty laying on her right side as well as pain after ambulating  Patient has difficulty putting on her shoes bending her right hip  Patient is currently being treated for left knee pain from her rheumatologist at this time  Patient denies any changes numbness tingling of her right   Lower extremity  Review Of Systems:   · Skin: Normal  · Neuro: See HPI  · Musculoskeletal: See HPI  · All other systems reviewed and are negative    Past Medical History:   Past Medical History:   Diagnosis Date    Arthritis     right hip    History of transfusion     unsure - could have been with surgery on back    Knee effusion, left     Need for HPV vaccination 2012    completed series    Scoliosis     Varicella     vaccine    Varicella vaccination        Past Surgical History:   Past Surgical History:   Procedure Laterality Date    HIP SURGERY Right     removal of benign bony growth    MD  DELIVERY ONLY N/A 2021    Procedure:  SECTION ();   Surgeon: Norma Horowitz DO;  Location: [facility] ;  Service: Obstetrics    SPINAL FUSION         Family History:  Family history reviewed and non-contributory  Family History   Problem Relation Age of Onset    Psoriasis Mother     Diabetes Mother     Thyroid disease Mother     Hypertension Father     Diabetes Father     No Known Problems Brother     Diabetes Maternal Grandmother     Emphysema Maternal Grandfather     No Known Problems Paternal Grandmother     Vision loss Paternal Grandfather     Hearing loss Paternal Grandfather     No Known Problems Half-Sister     No Known Problems Half-Brother     Breast cancer Neg Hx     Ovarian cancer Neg Hx     Colon cancer Neg Hx          Social History:  Social History     Socioeconomic History    Marital status: Single     Spouse name: Arturo Keenan    Number of children: 0    Years of education: currently in college    Highest education level: High school graduate   Occupational History    Occupation: Nutrition Services   Tobacco Use    Smoking status: Former Smoker     Quit date: 2020     Years since quittin 3    Smokeless tobacco: Never Used   Vaping Use    Vaping Use: Never used   Substance and Sexual Activity    Alcohol use: Not Currently     Comment: rarely prior to confirmed pregnancy    Drug use: Not Currently     Types: Marijuana     Comment: last used 2020    Sexual activity: Yes     Partners: Male     Birth control/protection: Condom     Comment: lifetime partners: 5; current partner: 2015   Other Topics Concern    None   Social History Narrative    Catholic: No preference    Accepts blood products     Social Determinants of Health     Financial Resource Strain:     Difficulty of Paying Living Expenses:    Food Insecurity:     Worried About Running Out of Food in the Last Year:     [address] in the Last Year:    Transportation Needs:     Lack of Transportation (Medical):      Lack of Transportation (Non-Medical):    Physical Activity:     Days of Exercise per Week:     Minutes of Exercise per Session:    Stress:     Feeling of Stress :    Social Connections:     Frequency of Communication with Friends and Family:     Frequency of Social Gatherings with Friends and Family:     Attends [employer] Services:     Active Member of Clubs or Organizations:     Attends Club or Organization Meetings:     Marital Status:    Intimate Partner Violence:     Fear of Current or Ex-Partner:     Emotionally Abused:     Physically Abused:     Sexually Abused:         Allergies:   No Known Allergies    Labs:  0   Lab Value Date/Time    HCT 29 3 (L) 01/25/2021 0123    HCT 35 7 01/24/2021 0651    HCT 35 9 11/09/2020 1046    HGB 9 2 (L) 01/25/2021 0123    HGB 11 5 01/24/2021 0651    HGB 11 3 (L) 11/09/2020 1046    WBC 22 58 (H) 01/25/2021 0123    WBC 17 60 (H) 01/24/2021 0651    WBC 10 83 (H) 11/09/2020 1046    ESR 27 (H) 09/12/2019 1246    CRP 22 4 (H) 09/12/2019 1246       Meds:    Current Outpatient Medications:     Diclofenac Sodium (VOLTAREN) 1 %, Apply 2 g topically 4 (four) times a day, Disp: 600 g, Rfl: 1    Multiple Vitamin (multivitamin) capsule, Take 1 capsule by mouth daily, Disp: , Rfl:     sulfaSALAzine (AZULFIDINE) 500 mg tablet, Take 2 tablets (1,000 mg total) by mouth 2 (two) times a day Total of 4 tabs in a day, Disp: 360 tablet, Rfl: 1    acetaminophen (TYLENOL) 325 mg tablet, Take 2 tablets (650 mg total) by mouth every 4 (four) hours (Patient not taking: Reported on 3/2/2021), Disp: 90 tablet, Rfl: 3    docusate sodium (COLACE) 100 mg capsule, Take 1 capsule (100 mg total) by mouth 2 (two) times a day (Patient not taking: Reported on 3/2/2021), Disp: 10 capsule, Rfl: 0    Prenatal MV & Min w/FA-DHA (PRENATAL ADULT GUMMY/DHA/FA) 0 4-25 MG CHEW, Chew 2 tablets daily (Patient not taking: Reported on 8/9/2021), Disp: , Rfl:       Physical Exam:     General Appearance:    Alert, cooperative, no distress, appears stated age   Head:    Normocephalic, without obvious abnormality, atraumatic   Eyes:    conjunctiva/corneas clear, both eyes        Nose:   Nares normal, septum midline, no drainage    Throat:   Lips normal; teeth and gums normal   Neck:    symmetrical, trachea midline, ;     thyroid:  no enlargement/   Back:     Symmetric, no curvature, ROM normal   Lungs:   No audible wheezing or labored breathing   Chest Wall:    No tenderness or deformity    Heart:    Regular rate and rhythm               Pulses:   2+ and symmetric all extremities   Skin:   Skin color, texture, turgor normal, no rashes or lesions   Neurologic:   normal strength, sensation and reflexes     throughout       Musculoskeletal: right lower extremity  ·   Examination patient's right hip active hip flexion  To 90 external rotation 20 internal rotation 10 significant increase in right hip groin pain  Hip flexion adduction abduction strength 5/5 patient active straight leg raise right lower extremity positive Stinchfield on the right active ankle dorsi plantar flexion sensation intact distal pulses present right lower extremity    Radiology:   I personally reviewed the films  X-rays right  Hip reveals severe arthritis bone-on-bone arthritis with significant degenerative changes of the right hip as well as subchondral sclerosis    _*_*_*_*_*_*_*_*_*_*_*_*_*_*_*_*_*_*_*_*_*_*_*_*_*_*_*_*_*_*_*_*_*_*_*_*_*_*_*_*_*    Assessment:  26 y  o female with right  Hip pain due to arthritis    Plan:   · Weight bearing as tolerated  right lower extremity  ·  patient interviewed and examined by Dr Zenaida Vásquez and myself  ·  patient is a candidate for right total hip arthroplasty  ·   Operative and non operative intervention reviewed with patient  Patient wishes to pursue operative intervention including total hip arthroplasty  Risks and benefits of the procedure reviewed with the patient in great length  Risks include, but are not limited to, infection, bleeding, DVT/PE, gait abnormality, Limp, chronic pain, stiffness, dislocation, instability, fracture, failure of hardware, neurovascular injury, compartment syndrome, decreased range of motion, loss of limb, leg length discrepancy and failure to achieve the desired results  Patient is aware of the risks and is willing to proceed  Appropriate medical clearance and laboratory workup will be ordered upon completion of office visit today  We'll followup with the patient in the postoperative period     Aspirin prescribed for DVT prophylaxis  · I did go through all the pros and cons of op and non-op with patient and she is in agreement with plan

## 2021-09-10 ENCOUNTER — TELEPHONE (OUTPATIENT)
Dept: RHEUMATOLOGY | Facility: CLINIC | Age: 26
End: 2021-09-10

## 2021-09-10 DIAGNOSIS — M19.90 INFLAMMATORY ARTHRITIS: Primary | ICD-10-CM

## 2021-09-10 RX ORDER — PREDNISONE 10 MG/1
TABLET ORAL
Qty: 70 TABLET | Refills: 0 | Status: SHIPPED | OUTPATIENT
Start: 2021-09-10 | End: 2021-10-08

## 2021-09-10 NOTE — TELEPHONE ENCOUNTER
----- Message from Monty Cervantes MD sent at 9/10/2021 10:18 AM EDT -----  Please let patient know that her inflammatory markers are very elevated  Is she having any hot, swollen joints? Would she like a prednisone course?

## 2021-09-10 NOTE — TELEPHONE ENCOUNTER
Please let patient know that I sent a different type of taper this time; starting at 40mg for a week, then going down by 10mg increments a week until off

## 2021-09-10 NOTE — RESULT ENCOUNTER NOTE
Please let patient know that her inflammatory markers are very elevated  Is she having any hot, swollen joints? Would she like a prednisone course?

## 2021-09-10 NOTE — TELEPHONE ENCOUNTER
Left knee has been swollen and she would like to do a course of prednisone if that's what you are suggesting   thanks

## 2021-10-01 ENCOUNTER — OFFICE VISIT (OUTPATIENT)
Dept: FAMILY MEDICINE CLINIC | Facility: CLINIC | Age: 26
End: 2021-10-01
Payer: COMMERCIAL

## 2021-10-01 VITALS
BODY MASS INDEX: 22.02 KG/M2 | WEIGHT: 137 LBS | DIASTOLIC BLOOD PRESSURE: 74 MMHG | HEIGHT: 66 IN | SYSTOLIC BLOOD PRESSURE: 118 MMHG | HEART RATE: 88 BPM | TEMPERATURE: 97.1 F | OXYGEN SATURATION: 99 %

## 2021-10-01 DIAGNOSIS — M19.90 INFLAMMATORY ARTHRITIS: ICD-10-CM

## 2021-10-01 DIAGNOSIS — Z01.818 PREOPERATIVE CLEARANCE: Primary | ICD-10-CM

## 2021-10-01 DIAGNOSIS — M16.11 PRIMARY OSTEOARTHRITIS OF RIGHT HIP: ICD-10-CM

## 2021-10-01 DIAGNOSIS — R77.8 ELEVATED TOTAL PROTEIN: ICD-10-CM

## 2021-10-01 DIAGNOSIS — M25.551 CHRONIC RIGHT HIP PAIN: ICD-10-CM

## 2021-10-01 DIAGNOSIS — G89.29 CHRONIC RIGHT HIP PAIN: ICD-10-CM

## 2021-10-01 PROCEDURE — 99242 OFF/OP CONSLTJ NEW/EST SF 20: CPT | Performed by: FAMILY MEDICINE

## 2021-10-05 ENCOUNTER — PATIENT OUTREACH (OUTPATIENT)
Dept: OBGYN CLINIC | Facility: HOSPITAL | Age: 26
End: 2021-10-05

## 2021-10-11 ENCOUNTER — TELEPHONE (OUTPATIENT)
Dept: FAMILY MEDICINE CLINIC | Facility: CLINIC | Age: 26
End: 2021-10-11

## 2021-10-14 ENCOUNTER — APPOINTMENT (OUTPATIENT)
Dept: PHYSICAL THERAPY | Facility: CLINIC | Age: 26
End: 2021-10-14
Payer: COMMERCIAL

## 2021-10-18 ENCOUNTER — EVALUATION (OUTPATIENT)
Dept: PHYSICAL THERAPY | Facility: CLINIC | Age: 26
End: 2021-10-18
Payer: COMMERCIAL

## 2021-10-18 DIAGNOSIS — Z51.89 AFTERCARE: ICD-10-CM

## 2021-10-18 DIAGNOSIS — Z01.818 PREOP TESTING: ICD-10-CM

## 2021-10-18 DIAGNOSIS — M16.11 PRIMARY OSTEOARTHRITIS OF ONE HIP, RIGHT: ICD-10-CM

## 2021-10-18 DIAGNOSIS — M25.551 RIGHT HIP PAIN: Primary | ICD-10-CM

## 2021-10-18 PROCEDURE — 97110 THERAPEUTIC EXERCISES: CPT | Performed by: PHYSICAL THERAPIST

## 2021-10-18 PROCEDURE — 97140 MANUAL THERAPY 1/> REGIONS: CPT | Performed by: PHYSICAL THERAPIST

## 2021-10-18 PROCEDURE — 97161 PT EVAL LOW COMPLEX 20 MIN: CPT | Performed by: PHYSICAL THERAPIST

## 2021-10-19 ENCOUNTER — APPOINTMENT (OUTPATIENT)
Dept: LAB | Facility: MEDICAL CENTER | Age: 26
End: 2021-10-19
Payer: COMMERCIAL

## 2021-10-19 DIAGNOSIS — Z01.818 PREOP TESTING: ICD-10-CM

## 2021-10-19 DIAGNOSIS — M16.11 PRIMARY OSTEOARTHRITIS OF ONE HIP, RIGHT: ICD-10-CM

## 2021-10-19 DIAGNOSIS — R77.8 ELEVATED TOTAL PROTEIN: ICD-10-CM

## 2021-10-19 LAB
EST. AVERAGE GLUCOSE BLD GHB EST-MCNC: 82 MG/DL
HBA1C MFR BLD: 4.5 %
PROT SERPL-MCNC: 8.8 G/DL (ref 6.4–8.2)

## 2021-10-19 PROCEDURE — 36415 COLL VENOUS BLD VENIPUNCTURE: CPT

## 2021-10-19 PROCEDURE — 84165 PROTEIN E-PHORESIS SERUM: CPT | Performed by: SPECIALIST

## 2021-10-19 PROCEDURE — 84155 ASSAY OF PROTEIN SERUM: CPT

## 2021-10-19 PROCEDURE — 83036 HEMOGLOBIN GLYCOSYLATED A1C: CPT

## 2021-10-19 PROCEDURE — U0005 INFEC AGEN DETEC AMPLI PROBE: HCPCS | Performed by: ORTHOPAEDIC SURGERY

## 2021-10-19 PROCEDURE — U0003 INFECTIOUS AGENT DETECTION BY NUCLEIC ACID (DNA OR RNA); SEVERE ACUTE RESPIRATORY SYNDROME CORONAVIRUS 2 (SARS-COV-2) (CORONAVIRUS DISEASE [COVID-19]), AMPLIFIED PROBE TECHNIQUE, MAKING USE OF HIGH THROUGHPUT TECHNOLOGIES AS DESCRIBED BY CMS-2020-01-R: HCPCS | Performed by: ORTHOPAEDIC SURGERY

## 2021-10-19 PROCEDURE — 84165 PROTEIN E-PHORESIS SERUM: CPT

## 2021-10-20 ENCOUNTER — ANESTHESIA EVENT (OUTPATIENT)
Dept: PERIOP | Facility: HOSPITAL | Age: 26
End: 2021-10-20
Payer: COMMERCIAL

## 2021-10-21 LAB
ALBUMIN SERPL ELPH-MCNC: 4.26 G/DL (ref 3.5–5)
ALBUMIN SERPL ELPH-MCNC: 52.6 % (ref 52–65)
ALPHA1 GLOB SERPL ELPH-MCNC: 0.4 G/DL (ref 0.1–0.4)
ALPHA1 GLOB SERPL ELPH-MCNC: 4.9 % (ref 2.5–5)
ALPHA2 GLOB SERPL ELPH-MCNC: 0.76 G/DL (ref 0.4–1.2)
ALPHA2 GLOB SERPL ELPH-MCNC: 9.4 % (ref 7–13)
BETA GLOB ABNORMAL SERPL ELPH-MCNC: 0.47 G/DL (ref 0.4–0.8)
BETA1 GLOB SERPL ELPH-MCNC: 5.8 % (ref 5–13)
BETA2 GLOB SERPL ELPH-MCNC: 6.8 % (ref 2–8)
BETA2+GAMMA GLOB SERPL ELPH-MCNC: 0.55 G/DL (ref 0.2–0.5)
GAMMA GLOB ABNORMAL SERPL ELPH-MCNC: 1.66 G/DL (ref 0.5–1.6)
GAMMA GLOB SERPL ELPH-MCNC: 20.5 % (ref 12–22)
IGG/ALB SER: 1.11 {RATIO} (ref 1.1–1.8)
PROT PATTERN SERPL ELPH-IMP: ABNORMAL
PROT SERPL-MCNC: 8.1 G/DL (ref 6.4–8.2)

## 2021-10-25 ENCOUNTER — HOSPITAL ENCOUNTER (OUTPATIENT)
Facility: HOSPITAL | Age: 26
Setting detail: OUTPATIENT SURGERY
Discharge: HOME | End: 2021-10-26
Attending: ORTHOPAEDIC SURGERY | Admitting: ORTHOPAEDIC SURGERY
Payer: COMMERCIAL

## 2021-10-25 ENCOUNTER — APPOINTMENT (OUTPATIENT)
Dept: RADIOLOGY | Facility: HOSPITAL | Age: 26
End: 2021-10-25
Payer: COMMERCIAL

## 2021-10-25 ENCOUNTER — ANESTHESIA (OUTPATIENT)
Dept: PERIOP | Facility: HOSPITAL | Age: 26
End: 2021-10-25
Payer: COMMERCIAL

## 2021-10-25 DIAGNOSIS — Z96.641 STATUS POST TOTAL HIP REPLACEMENT, RIGHT: Primary | ICD-10-CM

## 2021-10-25 DIAGNOSIS — Z11.59 SCREENING FOR VIRAL DISEASE: ICD-10-CM

## 2021-10-25 LAB
ABO GROUP BLD: NORMAL
BLD GP AB SCN SERPL QL: NEGATIVE
EXT PREGNANCY TEST URINE: NEGATIVE
EXT. CONTROL: NORMAL
GLUCOSE SERPL-MCNC: 147 MG/DL (ref 65–140)
GLUCOSE SERPL-MCNC: 96 MG/DL (ref 65–140)
RH BLD: POSITIVE
SPECIMEN EXPIRATION DATE: NORMAL

## 2021-10-25 PROCEDURE — C1776 JOINT DEVICE (IMPLANTABLE): HCPCS | Performed by: ORTHOPAEDIC SURGERY

## 2021-10-25 PROCEDURE — 27130 TOTAL HIP ARTHROPLASTY: CPT | Performed by: ORTHOPAEDIC SURGERY

## 2021-10-25 PROCEDURE — 97110 THERAPEUTIC EXERCISES: CPT

## 2021-10-25 PROCEDURE — 73502 X-RAY EXAM HIP UNI 2-3 VIEWS: CPT

## 2021-10-25 PROCEDURE — 86900 BLOOD TYPING SEROLOGIC ABO: CPT | Performed by: ORTHOPAEDIC SURGERY

## 2021-10-25 PROCEDURE — 86901 BLOOD TYPING SEROLOGIC RH(D): CPT | Performed by: ORTHOPAEDIC SURGERY

## 2021-10-25 PROCEDURE — 81025 URINE PREGNANCY TEST: CPT | Performed by: ORTHOPAEDIC SURGERY

## 2021-10-25 PROCEDURE — 97163 PT EVAL HIGH COMPLEX 45 MIN: CPT

## 2021-10-25 PROCEDURE — 82948 REAGENT STRIP/BLOOD GLUCOSE: CPT

## 2021-10-25 PROCEDURE — 86850 RBC ANTIBODY SCREEN: CPT | Performed by: ORTHOPAEDIC SURGERY

## 2021-10-25 DEVICE — PINNACLE HIP SOLUTIONS ALTRX POLYETHYLENE ACETABULAR LINER NEUTRAL 32MM ID 50MM OD
Type: IMPLANTABLE DEVICE | Site: HIP | Status: FUNCTIONAL
Brand: PINNACLE ALTRX

## 2021-10-25 DEVICE — PINNACLE GRIPTION ACETABULAR SHELL SECTOR 50MM OD
Type: IMPLANTABLE DEVICE | Site: HIP | Status: FUNCTIONAL
Brand: PINNACLE GRIPTION

## 2021-10-25 DEVICE — ACTIS DUOFIX HIP PROSTHESIS (FEMORAL STEM 12/14 TAPER CEMENTLESS SIZE 2 STD COLLAR)  CE
Type: IMPLANTABLE DEVICE | Site: HIP | Status: FUNCTIONAL
Brand: ACTIS

## 2021-10-25 DEVICE — BIOLOX DELTA CERAMIC FEMORAL HEAD 32MM DIA +5.0 12/14 TAPER
Type: IMPLANTABLE DEVICE | Site: HIP | Status: FUNCTIONAL
Brand: BIOLOX DELTA

## 2021-10-25 RX ORDER — ASPIRIN 81 MG/1
81 TABLET ORAL 2 TIMES DAILY
Status: DISCONTINUED | OUTPATIENT
Start: 2021-10-26 | End: 2021-10-26

## 2021-10-25 RX ORDER — NEOSTIGMINE METHYLSULFATE 1 MG/ML
INJECTION INTRAVENOUS AS NEEDED
Status: DISCONTINUED | OUTPATIENT
Start: 2021-10-25 | End: 2021-10-25

## 2021-10-25 RX ORDER — ACETAMINOPHEN 325 MG/1
975 TABLET ORAL EVERY 8 HOURS SCHEDULED
Status: DISCONTINUED | OUTPATIENT
Start: 2021-10-25 | End: 2021-10-26 | Stop reason: HOSPADM

## 2021-10-25 RX ORDER — METHOCARBAMOL 500 MG/1
500 TABLET, FILM COATED ORAL EVERY 6 HOURS SCHEDULED
Status: DISCONTINUED | OUTPATIENT
Start: 2021-10-25 | End: 2021-10-26 | Stop reason: HOSPADM

## 2021-10-25 RX ORDER — MAGNESIUM HYDROXIDE 1200 MG/15ML
LIQUID ORAL AS NEEDED
Status: DISCONTINUED | OUTPATIENT
Start: 2021-10-25 | End: 2021-10-25 | Stop reason: HOSPADM

## 2021-10-25 RX ORDER — SODIUM CHLORIDE 9 MG/ML
125 INJECTION, SOLUTION INTRAVENOUS CONTINUOUS
Status: DISCONTINUED | OUTPATIENT
Start: 2021-10-25 | End: 2021-10-26 | Stop reason: HOSPADM

## 2021-10-25 RX ORDER — SODIUM CHLORIDE, SODIUM LACTATE, POTASSIUM CHLORIDE, CALCIUM CHLORIDE 600; 310; 30; 20 MG/100ML; MG/100ML; MG/100ML; MG/100ML
INJECTION, SOLUTION INTRAVENOUS CONTINUOUS PRN
Status: DISCONTINUED | OUTPATIENT
Start: 2021-10-25 | End: 2021-10-25

## 2021-10-25 RX ORDER — ACETAMINOPHEN 325 MG/1
975 TABLET ORAL ONCE
Status: COMPLETED | OUTPATIENT
Start: 2021-10-25 | End: 2021-10-25

## 2021-10-25 RX ORDER — PROPOFOL 10 MG/ML
INJECTION, EMULSION INTRAVENOUS CONTINUOUS PRN
Status: DISCONTINUED | OUTPATIENT
Start: 2021-10-25 | End: 2021-10-25

## 2021-10-25 RX ORDER — SENNOSIDES 8.6 MG
650 CAPSULE ORAL EVERY 8 HOURS PRN
Qty: 30 TABLET | Refills: 0 | Status: SHIPPED | OUTPATIENT
Start: 2021-10-25 | End: 2021-11-24

## 2021-10-25 RX ORDER — ROCURONIUM BROMIDE 10 MG/ML
INJECTION, SOLUTION INTRAVENOUS AS NEEDED
Status: DISCONTINUED | OUTPATIENT
Start: 2021-10-25 | End: 2021-10-25

## 2021-10-25 RX ORDER — DEXAMETHASONE SODIUM PHOSPHATE 10 MG/ML
INJECTION, SOLUTION INTRAMUSCULAR; INTRAVENOUS AS NEEDED
Status: DISCONTINUED | OUTPATIENT
Start: 2021-10-25 | End: 2021-10-25

## 2021-10-25 RX ORDER — ONDANSETRON 2 MG/ML
INJECTION INTRAMUSCULAR; INTRAVENOUS AS NEEDED
Status: DISCONTINUED | OUTPATIENT
Start: 2021-10-25 | End: 2021-10-25

## 2021-10-25 RX ORDER — ONDANSETRON 2 MG/ML
4 INJECTION INTRAMUSCULAR; INTRAVENOUS ONCE AS NEEDED
Status: DISCONTINUED | OUTPATIENT
Start: 2021-10-25 | End: 2021-10-25 | Stop reason: HOSPADM

## 2021-10-25 RX ORDER — HYDROMORPHONE HCL/PF 1 MG/ML
0.5 SYRINGE (ML) INJECTION
Status: DISCONTINUED | OUTPATIENT
Start: 2021-10-25 | End: 2021-10-26 | Stop reason: HOSPADM

## 2021-10-25 RX ORDER — ASPIRIN 81 MG/1
81 TABLET ORAL 2 TIMES DAILY
Qty: 70 TABLET | Refills: 0 | Status: SHIPPED | OUTPATIENT
Start: 2021-10-25 | End: 2022-04-05

## 2021-10-25 RX ORDER — PROPOFOL 10 MG/ML
INJECTION, EMULSION INTRAVENOUS AS NEEDED
Status: DISCONTINUED | OUTPATIENT
Start: 2021-10-25 | End: 2021-10-25

## 2021-10-25 RX ORDER — OXYCODONE HYDROCHLORIDE 10 MG/1
10 TABLET ORAL EVERY 4 HOURS PRN
Status: DISCONTINUED | OUTPATIENT
Start: 2021-10-25 | End: 2021-10-26 | Stop reason: HOSPADM

## 2021-10-25 RX ORDER — ONDANSETRON 2 MG/ML
4 INJECTION INTRAMUSCULAR; INTRAVENOUS EVERY 4 HOURS PRN
Status: DISCONTINUED | OUTPATIENT
Start: 2021-10-25 | End: 2021-10-26 | Stop reason: HOSPADM

## 2021-10-25 RX ORDER — HYDROMORPHONE HCL 110MG/55ML
PATIENT CONTROLLED ANALGESIA SYRINGE INTRAVENOUS AS NEEDED
Status: DISCONTINUED | OUTPATIENT
Start: 2021-10-25 | End: 2021-10-25

## 2021-10-25 RX ORDER — OXYCODONE HYDROCHLORIDE 5 MG/1
5 TABLET ORAL EVERY 4 HOURS PRN
Status: DISCONTINUED | OUTPATIENT
Start: 2021-10-25 | End: 2021-10-26 | Stop reason: HOSPADM

## 2021-10-25 RX ORDER — GABAPENTIN 100 MG/1
100 CAPSULE ORAL 3 TIMES DAILY
Status: DISCONTINUED | OUTPATIENT
Start: 2021-10-25 | End: 2021-10-26 | Stop reason: HOSPADM

## 2021-10-25 RX ORDER — GLYCOPYRROLATE 0.2 MG/ML
INJECTION INTRAMUSCULAR; INTRAVENOUS AS NEEDED
Status: DISCONTINUED | OUTPATIENT
Start: 2021-10-25 | End: 2021-10-25

## 2021-10-25 RX ORDER — METOCLOPRAMIDE HYDROCHLORIDE 5 MG/ML
10 INJECTION INTRAMUSCULAR; INTRAVENOUS ONCE AS NEEDED
Status: COMPLETED | OUTPATIENT
Start: 2021-10-25 | End: 2021-10-25

## 2021-10-25 RX ORDER — FENTANYL CITRATE 50 UG/ML
INJECTION, SOLUTION INTRAMUSCULAR; INTRAVENOUS AS NEEDED
Status: DISCONTINUED | OUTPATIENT
Start: 2021-10-25 | End: 2021-10-25

## 2021-10-25 RX ORDER — FENTANYL CITRATE/PF 50 MCG/ML
25 SYRINGE (ML) INJECTION
Status: DISCONTINUED | OUTPATIENT
Start: 2021-10-25 | End: 2021-10-25 | Stop reason: HOSPADM

## 2021-10-25 RX ORDER — HYDROMORPHONE HCL/PF 1 MG/ML
0.5 SYRINGE (ML) INJECTION
Status: DISCONTINUED | OUTPATIENT
Start: 2021-10-25 | End: 2021-10-25 | Stop reason: HOSPADM

## 2021-10-25 RX ORDER — CHLORHEXIDINE GLUCONATE 4 G/100ML
SOLUTION TOPICAL DAILY PRN
Status: DISCONTINUED | OUTPATIENT
Start: 2021-10-25 | End: 2021-10-25

## 2021-10-25 RX ORDER — LIDOCAINE HYDROCHLORIDE 10 MG/ML
INJECTION, SOLUTION EPIDURAL; INFILTRATION; INTRACAUDAL; PERINEURAL AS NEEDED
Status: DISCONTINUED | OUTPATIENT
Start: 2021-10-25 | End: 2021-10-25

## 2021-10-25 RX ORDER — DEXMEDETOMIDINE HYDROCHLORIDE 100 UG/ML
INJECTION, SOLUTION INTRAVENOUS AS NEEDED
Status: DISCONTINUED | OUTPATIENT
Start: 2021-10-25 | End: 2021-10-25

## 2021-10-25 RX ORDER — ALBUTEROL SULFATE 2.5 MG/3ML
2.5 SOLUTION RESPIRATORY (INHALATION) ONCE AS NEEDED
Status: DISCONTINUED | OUTPATIENT
Start: 2021-10-25 | End: 2021-10-25 | Stop reason: HOSPADM

## 2021-10-25 RX ORDER — OXYCODONE HYDROCHLORIDE 5 MG/1
TABLET ORAL
Qty: 30 TABLET | Refills: 0 | Status: SHIPPED | OUTPATIENT
Start: 2021-10-25 | End: 2022-04-05

## 2021-10-25 RX ORDER — LIDOCAINE HYDROCHLORIDE AND EPINEPHRINE 10; 10 MG/ML; UG/ML
INJECTION, SOLUTION INFILTRATION; PERINEURAL AS NEEDED
Status: DISCONTINUED | OUTPATIENT
Start: 2021-10-25 | End: 2021-10-25 | Stop reason: HOSPADM

## 2021-10-25 RX ORDER — CEFAZOLIN SODIUM 2 G/50ML
2000 SOLUTION INTRAVENOUS ONCE
Status: COMPLETED | OUTPATIENT
Start: 2021-10-25 | End: 2021-10-25

## 2021-10-25 RX ORDER — MIDAZOLAM HYDROCHLORIDE 2 MG/2ML
INJECTION, SOLUTION INTRAMUSCULAR; INTRAVENOUS AS NEEDED
Status: DISCONTINUED | OUTPATIENT
Start: 2021-10-25 | End: 2021-10-25

## 2021-10-25 RX ADMIN — GABAPENTIN 100 MG: 100 CAPSULE ORAL at 20:51

## 2021-10-25 RX ADMIN — ENOXAPARIN SODIUM 40 MG: 40 INJECTION SUBCUTANEOUS at 23:10

## 2021-10-25 RX ADMIN — ONDANSETRON 4 MG: 2 INJECTION INTRAMUSCULAR; INTRAVENOUS at 20:55

## 2021-10-25 RX ADMIN — FENTANYL CITRATE 50 MCG: 50 INJECTION, SOLUTION INTRAMUSCULAR; INTRAVENOUS at 11:30

## 2021-10-25 RX ADMIN — CEFAZOLIN SODIUM 2000 MG: 2 SOLUTION INTRAVENOUS at 09:29

## 2021-10-25 RX ADMIN — PROPOFOL 60 MCG/KG/MIN: 10 INJECTION, EMULSION INTRAVENOUS at 09:45

## 2021-10-25 RX ADMIN — GLYCOPYRROLATE 0.4 MG: 0.2 INJECTION, SOLUTION INTRAMUSCULAR; INTRAVENOUS at 11:01

## 2021-10-25 RX ADMIN — FENTANYL CITRATE 25 MCG: 50 INJECTION INTRAMUSCULAR; INTRAVENOUS at 12:05

## 2021-10-25 RX ADMIN — PROPOFOL 100 MG: 10 INJECTION, EMULSION INTRAVENOUS at 09:35

## 2021-10-25 RX ADMIN — TRANEXAMIC ACID 1000 MG: 1 INJECTION, SOLUTION INTRAVENOUS at 09:33

## 2021-10-25 RX ADMIN — ACETAMINOPHEN 975 MG: 325 TABLET, FILM COATED ORAL at 08:56

## 2021-10-25 RX ADMIN — MIDAZOLAM HYDROCHLORIDE 2 MG: 1 INJECTION, SOLUTION INTRAMUSCULAR; INTRAVENOUS at 09:29

## 2021-10-25 RX ADMIN — HYDROMORPHONE HYDROCHLORIDE 0.5 MG: 2 INJECTION INTRAMUSCULAR; INTRAVENOUS; SUBCUTANEOUS at 10:47

## 2021-10-25 RX ADMIN — FENTANYL CITRATE 50 MCG: 50 INJECTION, SOLUTION INTRAMUSCULAR; INTRAVENOUS at 09:35

## 2021-10-25 RX ADMIN — ONDANSETRON 4 MG: 2 INJECTION INTRAMUSCULAR; INTRAVENOUS at 09:57

## 2021-10-25 RX ADMIN — OXYCODONE HYDROCHLORIDE 5 MG: 5 TABLET ORAL at 18:14

## 2021-10-25 RX ADMIN — METHOCARBAMOL 500 MG: 500 TABLET ORAL at 23:10

## 2021-10-25 RX ADMIN — SODIUM CHLORIDE, SODIUM LACTATE, POTASSIUM CHLORIDE, AND CALCIUM CHLORIDE: .6; .31; .03; .02 INJECTION, SOLUTION INTRAVENOUS at 10:50

## 2021-10-25 RX ADMIN — HYDROMORPHONE HYDROCHLORIDE 0.5 MG: 2 INJECTION INTRAMUSCULAR; INTRAVENOUS; SUBCUTANEOUS at 10:15

## 2021-10-25 RX ADMIN — DEXMEDETOMIDINE HYDROCHLORIDE 8 MCG: 100 INJECTION, SOLUTION INTRAVENOUS at 10:09

## 2021-10-25 RX ADMIN — DEXMEDETOMIDINE HYDROCHLORIDE 12 MCG: 100 INJECTION, SOLUTION INTRAVENOUS at 10:13

## 2021-10-25 RX ADMIN — ACETAMINOPHEN 975 MG: 325 TABLET, FILM COATED ORAL at 20:51

## 2021-10-25 RX ADMIN — LIDOCAINE HYDROCHLORIDE 50 MG: 10 INJECTION, SOLUTION EPIDURAL; INFILTRATION; INTRACAUDAL at 09:35

## 2021-10-25 RX ADMIN — METOCLOPRAMIDE HYDROCHLORIDE 10 MG: 5 INJECTION INTRAMUSCULAR; INTRAVENOUS at 15:57

## 2021-10-25 RX ADMIN — METHOCARBAMOL 500 MG: 500 TABLET ORAL at 18:14

## 2021-10-25 RX ADMIN — FENTANYL CITRATE 25 MCG: 50 INJECTION INTRAMUSCULAR; INTRAVENOUS at 11:56

## 2021-10-25 RX ADMIN — SODIUM CHLORIDE, SODIUM LACTATE, POTASSIUM CHLORIDE, AND CALCIUM CHLORIDE: .6; .31; .03; .02 INJECTION, SOLUTION INTRAVENOUS at 09:29

## 2021-10-25 RX ADMIN — NEOSTIGMINE METHYLSULFATE 3 MG: 1 INJECTION INTRAVENOUS at 11:01

## 2021-10-25 RX ADMIN — PROPOFOL 50 MG: 10 INJECTION, EMULSION INTRAVENOUS at 09:37

## 2021-10-25 RX ADMIN — SODIUM CHLORIDE 125 ML/HR: 0.9 INJECTION, SOLUTION INTRAVENOUS at 11:36

## 2021-10-25 RX ADMIN — ROCURONIUM BROMIDE 40 MG: 10 SOLUTION INTRAVENOUS at 09:36

## 2021-10-25 RX ADMIN — DEXAMETHASONE SODIUM PHOSPHATE 4 MG: 10 INJECTION, SOLUTION INTRAMUSCULAR; INTRAVENOUS at 09:57

## 2021-10-25 RX ADMIN — HYDROMORPHONE HYDROCHLORIDE 0.5 MG: 1 INJECTION, SOLUTION INTRAMUSCULAR; INTRAVENOUS; SUBCUTANEOUS at 12:27

## 2021-10-26 VITALS
BODY MASS INDEX: 22.5 KG/M2 | DIASTOLIC BLOOD PRESSURE: 73 MMHG | OXYGEN SATURATION: 95 % | RESPIRATION RATE: 16 BRPM | HEIGHT: 66 IN | SYSTOLIC BLOOD PRESSURE: 122 MMHG | TEMPERATURE: 98.6 F | WEIGHT: 140 LBS | HEART RATE: 95 BPM

## 2021-10-26 LAB
ANION GAP SERPL CALCULATED.3IONS-SCNC: 8 MMOL/L (ref 4–13)
BUN SERPL-MCNC: 5 MG/DL (ref 5–25)
CALCIUM SERPL-MCNC: 8.2 MG/DL (ref 8.3–10.1)
CHLORIDE SERPL-SCNC: 104 MMOL/L (ref 100–108)
CO2 SERPL-SCNC: 25 MMOL/L (ref 21–32)
CREAT SERPL-MCNC: 0.71 MG/DL (ref 0.6–1.3)
DME PARACHUTE DELIVERY DATE ACTUAL: NORMAL
DME PARACHUTE DELIVERY DATE REQUESTED: NORMAL
DME PARACHUTE ITEM DESCRIPTION: NORMAL
DME PARACHUTE ORDER STATUS: NORMAL
DME PARACHUTE SUPPLIER NAME: NORMAL
DME PARACHUTE SUPPLIER PHONE: NORMAL
ERYTHROCYTE [DISTWIDTH] IN BLOOD BY AUTOMATED COUNT: 16.2 % (ref 11.6–15.1)
GFR SERPL CREATININE-BSD FRML MDRD: 119 ML/MIN/1.73SQ M
GLUCOSE SERPL-MCNC: 136 MG/DL (ref 65–140)
HCT VFR BLD AUTO: 28.8 % (ref 34.8–46.1)
HGB BLD-MCNC: 9.2 G/DL (ref 11.5–15.4)
MCH RBC QN AUTO: 28.6 PG (ref 26.8–34.3)
MCHC RBC AUTO-ENTMCNC: 31.9 G/DL (ref 31.4–37.4)
MCV RBC AUTO: 89 FL (ref 82–98)
PLATELET # BLD AUTO: 319 THOUSANDS/UL (ref 149–390)
PMV BLD AUTO: 9 FL (ref 8.9–12.7)
POTASSIUM SERPL-SCNC: 3.3 MMOL/L (ref 3.5–5.3)
RBC # BLD AUTO: 3.22 MILLION/UL (ref 3.81–5.12)
SODIUM SERPL-SCNC: 137 MMOL/L (ref 136–145)
WBC # BLD AUTO: 13.95 THOUSAND/UL (ref 4.31–10.16)

## 2021-10-26 PROCEDURE — 85027 COMPLETE CBC AUTOMATED: CPT | Performed by: PHYSICIAN ASSISTANT

## 2021-10-26 PROCEDURE — 80048 BASIC METABOLIC PNL TOTAL CA: CPT | Performed by: PHYSICIAN ASSISTANT

## 2021-10-26 PROCEDURE — NC001 PR NO CHARGE: Performed by: PHYSICIAN ASSISTANT

## 2021-10-26 PROCEDURE — 97530 THERAPEUTIC ACTIVITIES: CPT

## 2021-10-26 PROCEDURE — 99024 POSTOP FOLLOW-UP VISIT: CPT | Performed by: PHYSICIAN ASSISTANT

## 2021-10-26 PROCEDURE — 97167 OT EVAL HIGH COMPLEX 60 MIN: CPT

## 2021-10-26 RX ORDER — SODIUM CHLORIDE 9 MG/ML
125 INJECTION, SOLUTION INTRAVENOUS CONTINUOUS
Status: DISCONTINUED | OUTPATIENT
Start: 2021-10-26 | End: 2021-10-26

## 2021-10-26 RX ORDER — AMOXICILLIN 250 MG
2 CAPSULE ORAL DAILY
Status: DISCONTINUED | OUTPATIENT
Start: 2021-10-26 | End: 2021-10-26 | Stop reason: HOSPADM

## 2021-10-26 RX ORDER — CEFAZOLIN SODIUM 2 G/50ML
2000 SOLUTION INTRAVENOUS EVERY 8 HOURS
Status: COMPLETED | OUTPATIENT
Start: 2021-10-26 | End: 2021-10-26

## 2021-10-26 RX ORDER — DOCUSATE SODIUM 100 MG/1
100 CAPSULE, LIQUID FILLED ORAL 2 TIMES DAILY
Status: DISCONTINUED | OUTPATIENT
Start: 2021-10-26 | End: 2021-10-26 | Stop reason: HOSPADM

## 2021-10-26 RX ORDER — ONDANSETRON 4 MG/1
4 TABLET, FILM COATED ORAL EVERY 8 HOURS PRN
Qty: 10 TABLET | Refills: 0 | Status: SHIPPED | OUTPATIENT
Start: 2021-10-26 | End: 2022-04-05

## 2021-10-26 RX ADMIN — IRON SUCROSE 300 MG: 20 INJECTION, SOLUTION INTRAVENOUS at 11:42

## 2021-10-26 RX ADMIN — ACETAMINOPHEN 975 MG: 325 TABLET, FILM COATED ORAL at 13:43

## 2021-10-26 RX ADMIN — SODIUM CHLORIDE 125 ML/HR: 0.9 INJECTION, SOLUTION INTRAVENOUS at 11:42

## 2021-10-26 RX ADMIN — SODIUM CHLORIDE, SODIUM LACTATE, POTASSIUM CHLORIDE, AND CALCIUM CHLORIDE 500 ML: .6; .31; .03; .02 INJECTION, SOLUTION INTRAVENOUS at 13:42

## 2021-10-26 RX ADMIN — DOCUSATE SODIUM AND SENNOSIDES 2 TABLET: 8.6; 5 TABLET ORAL at 08:37

## 2021-10-26 RX ADMIN — GABAPENTIN 100 MG: 100 CAPSULE ORAL at 08:37

## 2021-10-26 RX ADMIN — OXYCODONE HYDROCHLORIDE 5 MG: 5 TABLET ORAL at 01:12

## 2021-10-26 RX ADMIN — ACETAMINOPHEN 975 MG: 325 TABLET, FILM COATED ORAL at 05:55

## 2021-10-26 RX ADMIN — ONDANSETRON 4 MG: 2 INJECTION INTRAMUSCULAR; INTRAVENOUS at 09:30

## 2021-10-26 RX ADMIN — DOCUSATE SODIUM 100 MG: 100 CAPSULE ORAL at 08:37

## 2021-10-26 RX ADMIN — CEFAZOLIN SODIUM 2000 MG: 2 SOLUTION INTRAVENOUS at 01:08

## 2021-10-26 RX ADMIN — CEFAZOLIN SODIUM 2000 MG: 2 SOLUTION INTRAVENOUS at 08:36

## 2021-10-26 RX ADMIN — OXYCODONE HYDROCHLORIDE 5 MG: 5 TABLET ORAL at 05:55

## 2021-10-26 RX ADMIN — METHOCARBAMOL 500 MG: 500 TABLET ORAL at 11:46

## 2021-10-26 RX ADMIN — METHOCARBAMOL 500 MG: 500 TABLET ORAL at 05:55

## 2021-10-26 RX ADMIN — SODIUM CHLORIDE, SODIUM LACTATE, POTASSIUM CHLORIDE, AND CALCIUM CHLORIDE 500 ML: .6; .31; .03; .02 INJECTION, SOLUTION INTRAVENOUS at 08:35

## 2021-10-28 ENCOUNTER — PATIENT OUTREACH (OUTPATIENT)
Dept: OBGYN CLINIC | Facility: HOSPITAL | Age: 26
End: 2021-10-28

## 2021-10-28 ENCOUNTER — OFFICE VISIT (OUTPATIENT)
Dept: PHYSICAL THERAPY | Facility: CLINIC | Age: 26
End: 2021-10-28
Payer: COMMERCIAL

## 2021-10-28 DIAGNOSIS — M25.551 RIGHT HIP PAIN: Primary | ICD-10-CM

## 2021-10-28 DIAGNOSIS — M16.11 PRIMARY OSTEOARTHRITIS OF ONE HIP, RIGHT: ICD-10-CM

## 2021-10-28 DIAGNOSIS — Z51.89 AFTERCARE: ICD-10-CM

## 2021-10-28 PROCEDURE — 97112 NEUROMUSCULAR REEDUCATION: CPT | Performed by: PHYSICAL THERAPIST

## 2021-10-28 PROCEDURE — 97140 MANUAL THERAPY 1/> REGIONS: CPT | Performed by: PHYSICAL THERAPIST

## 2021-10-28 PROCEDURE — 97110 THERAPEUTIC EXERCISES: CPT | Performed by: PHYSICAL THERAPIST

## 2021-10-28 PROCEDURE — 97164 PT RE-EVAL EST PLAN CARE: CPT | Performed by: PHYSICAL THERAPIST

## 2021-11-05 ENCOUNTER — OFFICE VISIT (OUTPATIENT)
Dept: PHYSICAL THERAPY | Facility: CLINIC | Age: 26
End: 2021-11-05
Payer: COMMERCIAL

## 2021-11-05 DIAGNOSIS — Z01.818 PREOP TESTING: ICD-10-CM

## 2021-11-05 DIAGNOSIS — M16.11 PRIMARY OSTEOARTHRITIS OF ONE HIP, RIGHT: ICD-10-CM

## 2021-11-05 DIAGNOSIS — Z51.89 AFTERCARE: ICD-10-CM

## 2021-11-05 DIAGNOSIS — M25.551 RIGHT HIP PAIN: Primary | ICD-10-CM

## 2021-11-05 PROCEDURE — 97112 NEUROMUSCULAR REEDUCATION: CPT

## 2021-11-05 PROCEDURE — 97140 MANUAL THERAPY 1/> REGIONS: CPT

## 2021-11-05 PROCEDURE — 97110 THERAPEUTIC EXERCISES: CPT

## 2021-11-09 ENCOUNTER — OFFICE VISIT (OUTPATIENT)
Dept: OBGYN CLINIC | Facility: HOSPITAL | Age: 26
End: 2021-11-09

## 2021-11-09 ENCOUNTER — HOSPITAL ENCOUNTER (OUTPATIENT)
Dept: RADIOLOGY | Facility: HOSPITAL | Age: 26
Discharge: HOME | End: 2021-11-09
Attending: ORTHOPAEDIC SURGERY
Payer: COMMERCIAL

## 2021-11-09 VITALS
SYSTOLIC BLOOD PRESSURE: 125 MMHG | BODY MASS INDEX: 22.5 KG/M2 | HEIGHT: 66 IN | WEIGHT: 140 LBS | DIASTOLIC BLOOD PRESSURE: 76 MMHG | HEART RATE: 120 BPM

## 2021-11-09 DIAGNOSIS — Z96.641 S/P HIP REPLACEMENT, RIGHT: Primary | ICD-10-CM

## 2021-11-09 DIAGNOSIS — Z96.641 S/P HIP REPLACEMENT, RIGHT: ICD-10-CM

## 2021-11-09 PROCEDURE — 73502 X-RAY EXAM HIP UNI 2-3 VIEWS: CPT

## 2021-11-09 PROCEDURE — 99024 POSTOP FOLLOW-UP VISIT: CPT | Performed by: ORTHOPAEDIC SURGERY

## 2021-11-11 ENCOUNTER — OFFICE VISIT (OUTPATIENT)
Dept: PHYSICAL THERAPY | Facility: CLINIC | Age: 26
End: 2021-11-11
Payer: COMMERCIAL

## 2021-11-11 DIAGNOSIS — M25.551 RIGHT HIP PAIN: Primary | ICD-10-CM

## 2021-11-11 DIAGNOSIS — Z51.89 AFTERCARE: ICD-10-CM

## 2021-11-11 DIAGNOSIS — Z01.818 PREOP TESTING: ICD-10-CM

## 2021-11-11 DIAGNOSIS — M16.11 PRIMARY OSTEOARTHRITIS OF ONE HIP, RIGHT: ICD-10-CM

## 2021-11-11 PROCEDURE — 97140 MANUAL THERAPY 1/> REGIONS: CPT

## 2021-11-11 PROCEDURE — 97110 THERAPEUTIC EXERCISES: CPT

## 2021-11-11 PROCEDURE — 97112 NEUROMUSCULAR REEDUCATION: CPT

## 2021-11-18 ENCOUNTER — APPOINTMENT (OUTPATIENT)
Dept: PHYSICAL THERAPY | Facility: CLINIC | Age: 26
End: 2021-11-18
Payer: COMMERCIAL

## 2021-11-18 ENCOUNTER — OFFICE VISIT (OUTPATIENT)
Dept: PHYSICAL THERAPY | Facility: REHABILITATION | Age: 26
End: 2021-11-18
Payer: COMMERCIAL

## 2021-11-18 DIAGNOSIS — M25.551 RIGHT HIP PAIN: Primary | ICD-10-CM

## 2021-11-18 DIAGNOSIS — M16.11 PRIMARY OSTEOARTHRITIS OF ONE HIP, RIGHT: ICD-10-CM

## 2021-11-18 DIAGNOSIS — Z96.641 STATUS POST TOTAL HIP REPLACEMENT, RIGHT: ICD-10-CM

## 2021-11-18 DIAGNOSIS — Z51.89 AFTERCARE: ICD-10-CM

## 2021-11-18 PROCEDURE — 97110 THERAPEUTIC EXERCISES: CPT

## 2021-11-18 PROCEDURE — 97140 MANUAL THERAPY 1/> REGIONS: CPT

## 2021-11-18 PROCEDURE — 97116 GAIT TRAINING THERAPY: CPT

## 2021-12-27 ENCOUNTER — IMMUNIZATIONS (OUTPATIENT)
Dept: FAMILY MEDICINE CLINIC | Facility: HOSPITAL | Age: 26
End: 2021-12-27

## 2021-12-27 DIAGNOSIS — Z23 ENCOUNTER FOR IMMUNIZATION: Primary | ICD-10-CM

## 2021-12-27 PROCEDURE — 0002A COVID-19 PFIZER VACC 0.3 ML: CPT

## 2021-12-27 PROCEDURE — 91300 COVID-19 PFIZER VACC 0.3 ML: CPT

## 2022-02-21 NOTE — PROGRESS NOTES
Assessment and Plan: Judi Flores is a 23 y o  female who presents for follow-up of her reactive arthritis, mainly involving left knee  Her left knee is still in pain  5/10 in intensity  Symptoms are not optimally controlled  No pain in other joints  Patient is willing to have another knee injection  Patient had a baby  Patient is not breastfeeding now  Patient is not on birth control  Counseling patient that Sulfasalazine and hydroxychloroquine are safe if patient gets pregnant again  Do labs  Left knee steroid injection given in clinic today  Continue diclofenac gel as needed for joint pain  Continue sulfasalazine 2 tabs twice a day  Continue naproxen as needed - can take twice a day with food as needed for joint pain  Start hydroxychloroquine one and a half tabs daily    Plan:  Diagnoses and all orders for this visit:    Reactive arthritis of left knee (HCC)  -     naproxen (NAPROSYN) 500 mg tablet; Take 1 tablet (500 mg total) by mouth 2 (two) times a day with meals As needed for joint pain  -     hydroxychloroquine (PLAQUENIL) 200 mg tablet; Take 1 5 tablets (300 mg total) by mouth in the morning  -     CBC and differential  -     Comprehensive metabolic panel  -     C-reactive protein  -     Sedimentation rate, automated  -     triamcinolone acetonide (KENALOG-40) 40 mg/mL injection 40 mg  -     lidocaine (XYLOCAINE) 1 % injection 1 mL  -     Large joint arthrocentesis: L knee    Inflammatory arthritis  -     sulfaSALAzine (AZULFIDINE) 500 mg tablet; Take 2 tablets (1,000 mg total) by mouth 2 (two) times a day Total of 4 tabs in a day  -     naproxen (NAPROSYN) 500 mg tablet; Take 1 tablet (500 mg total) by mouth 2 (two) times a day with meals As needed for joint pain  -     hydroxychloroquine (PLAQUENIL) 200 mg tablet;  Take 1 5 tablets (300 mg total) by mouth in the morning  -     CBC and differential  -     Comprehensive metabolic panel  -     C-reactive protein  -     Sedimentation rate, automated  -     triamcinolone acetonide (KENALOG-40) 40 mg/mL injection 40 mg  -     lidocaine (XYLOCAINE) 1 % injection 1 mL    Left knee pain, unspecified chronicity  -     triamcinolone acetonide (KENALOG-40) 40 mg/mL injection 40 mg  -     lidocaine (XYLOCAINE) 1 % injection 1 mL    Effusion of left knee  -     triamcinolone acetonide (KENALOG-40) 40 mg/mL injection 40 mg  -     lidocaine (XYLOCAINE) 1 % injection 1 mL    Low vitamin D level  -     Vitamin D 25 hydroxy    High risk medication use    High risk medication use - Benefits and risks of hydroxychloroquine, including but not limited to retinal toxicity, corneal deposits, gastrointestinal side effects, and headaches were discussed with the patient  The need for a regular eye exam to monitor for ocular toxicity while on this medication was also explained to the patient  Large joint arthrocentesis: L knee  Universal Protocol:  Consent: Verbal consent obtained  Written consent not obtained  Risks and benefits: risks, benefits and alternatives were discussed  Consent given by: patient  Timeout called at: 2/22/2022 10:00 AM   Patient understanding: patient states understanding of the procedure being performed  Patient consent: the patient's understanding of the procedure matches consent given  Procedure consent: procedure consent matches procedure scheduled  Relevant documents: relevant documents present and verified  Test results: test results available and properly labeled  Site marked: the operative site was marked  Radiology Images displayed and confirmed   If images not available, report reviewed: imaging studies available  Patient identity confirmed: verbally with patient    Supporting Documentation  Indications: pain and joint swelling   Procedure Details  Location: knee - L knee  Preparation: Patient was prepped and draped in the usual sterile fashion  Needle size: 25 G  Ultrasound guidance: no  Approach: anterolateral    Patient tolerance: patient tolerated the procedure well with no immediate complications  Dressing:  Sterile dressing applied    After discussing the risks/benefits of left knee steroid injection, including minor risk of infection, bleeding, pain, or ineffectiveness, informed consent was obtained and patient was agreeable to proceed  Using sterile technique, the left knee was prepped with Chloraprep, and was topically anesthetized with Ethyl Chloride  The joint was entered using an anterolateral approach and Kenalog 40 mg and 1 mL lidocaine 1% were then injected and the needle withdrawn  The procedure was well tolerated  Patient was instructed to contact our office with any concerns or questions  Follow-up plan: Return to clinic in 3-4 months        Rheumatic Disease Summary  Yun Lopez is a 26 y  o  female who originally presented on 8/26/19 as a Rheumatology consult referred by Orthopedics provider Dr Tha Adair evaluation of inflammatory arthritis found on recent left knee MRI   Patient was determined to have an inflammatory monoarthritis based on physical exam and MRI, possibly secondary to reactive arthritis of unknown etiology; she may have been exposed to an infection while working in the [employer] in [place] back in 2016  Administered a Kenalog steroid injection into her left knee at her initial visit to acutely help with her joint pain and inflammation  Also started patient on DMARD therapy with sulfasalazine 500 mg p o  B i d  for long-term control of her inflammatory arthritis; this is a relatively safe medication that does not require drug toxicity monitoring and is safe in pregnancy if patient decides to get pregnant   While giving sulfasalazine time to take effect, had also prescribed for patient naproxen 500 mg p  O  B i d  with meals to take as needed  Have ordered a rheumatoid factor and anti-CCP to evaluate for underlying rheumatoid arthritis      She presented for follow-up on 10/21/19 for her 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, which had been under control s/p intra-articular steroid injection at her prior clinic visit  Highest on differential for patient her age to have an inflammatory monoarthritis is reactive arthritis  Patient was asked to restart sulfasalazine 500mg po bid for more long-term management of her inflammatory arthritis; may be able to discontinue it in 6 months if she remains symptom-free  She can continue taking naproxen 500mg po bid as needed, which patient had not been requiring much  Prescribed amoxicillin 500mg po q8 hours for 7 days to treat patient's group B strep UTI       Next clinic visit was 6/30/20  Her reactive arthritis seemed to be active again involving her left knee since stopping sulfasalazine in May due to finding out about being pregnant  Assured patient that it is safe to take sulfasalazine in pregnancy, and asked her to restart it at 500mg po bid  Also prescribed diclofenac gel as needed for her joint pain instead of oral NSAIDs due to pregnancy      She then presented 8/18/20 for follow-up of reactive arthritis, with her left knee being especially active  Was successfully administered a left knee steroid injection in clinic  Patient was to continue her bid sulfasalazine and diclofenac gel as needed for joint pain  8/9/21: Patient had her baby  Still had intermittent left knee pain and swelling, but it was not too bothersome  Continued diclofenac gel as needed for joint pain and sulfasalazine 2 tabs twice a day         ALHAJI Martinez is a 23 y o   female who presents for follow-up of reactive arthritis  Her last visit was 8/9/21  Her left knee has been acting up  Left joint pain is constant, 5/10 in intensity, associated with knee swelling  Patient is taking Voltaren gel and naproxen once a week if she has keen swelling  Patient had a course of prednisone taper at Sep  2021  Patient had right hip replacement at 10/25/2021  Patient is a current Medical assistance at [facility] started last month  Patient is likely to transition care to [facility] due to insurance  The following portions of the patient's history were reviewed and updated as appropriate: allergies, current medications, past family history, past medical history, past social history, past surgical history and problem list     Review of Systems   Constitutional: Negative for chills and fever  HENT: Negative for congestion, rhinorrhea, sneezing and sore throat  Eyes: Negative for pain and discharge  Respiratory: Negative for cough, chest tightness, shortness of breath and wheezing  Cardiovascular: Negative for chest pain and leg swelling  Gastrointestinal: Negative for abdominal pain, nausea and vomiting  Endocrine: Negative for polydipsia, polyphagia and polyuria  Genitourinary: Negative for flank pain, frequency and urgency  Musculoskeletal: Positive for arthralgias and joint swelling (left knee)  Negative for back pain  Skin: Negative for color change and pallor  Neurological: Negative for dizziness, weakness, light-headedness and headaches  Psychiatric/Behavioral: Negative for agitation and confusion       Home Medications:    Current Outpatient Medications:     ascorbic acid (VITAMIN C) 500 MG tablet, Take 1 tablet (500 mg total) by mouth daily, Disp: 60 tablet, Rfl: 0    Diclofenac Sodium (VOLTAREN) 1 %, Apply 2 g topically 4 (four) times a day, Disp: 600 g, Rfl: 1    Multiple Vitamin (multivitamin) capsule, Take 1 capsule by mouth daily, Disp: , Rfl:     sulfaSALAzine (AZULFIDINE) 500 mg tablet, Take 2 tablets (1,000 mg total) by mouth 2 (two) times a day Total of 4 tabs in a day, Disp: 360 tablet, Rfl: 1    aspirin (ECOTRIN LOW STRENGTH) 81 mg EC tablet, Take 1 tablet (81 mg total) by mouth 2 (two) times a day Please do not start taking until after total joint arthroplasty, Disp: 70 tablet, Rfl: 0    ferrous sulfate 324 (65 Fe) mg, Take 1 tablet (324 mg total) by mouth 2 (two) times a day before meals (Patient not taking: Reported on 2/22/2022 ), Disp: 60 tablet, Rfl: 0    folic acid (FOLVITE) 1 mg tablet, Take 1 tablet (1 mg total) by mouth daily (Patient not taking: Reported on 2/22/2022 ), Disp: 30 tablet, Rfl: 0    hydroxychloroquine (PLAQUENIL) 200 mg tablet, Take 1 5 tablets (300 mg total) by mouth in the morning, Disp: 135 tablet, Rfl: 1    naproxen (NAPROSYN) 500 mg tablet, Take 1 tablet (500 mg total) by mouth 2 (two) times a day with meals As needed for joint pain, Disp: 60 tablet, Rfl: 6    ondansetron (ZOFRAN) 4 mg tablet, Take 1 tablet (4 mg total) by mouth every 8 (eight) hours as needed for nausea or vomiting (Patient not taking: Reported on 2/22/2022 ), Disp: 10 tablet, Rfl: 0    oxyCODONE (ROXICODONE) 5 immediate release tablet, Take 1 tablets every 6 hrs as needed for pain control (Patient not taking: Reported on 2/22/2022 ), Disp: 30 tablet, Rfl: 0    Objective:    Vitals:    02/22/22 0918   BP: 124/80   Pulse: 82   Weight: 61 7 kg (136 lb)   Height: 5' 5" (1 651 m)       Physical Exam  Constitutional:       General: She is not in acute distress  Appearance: She is well-developed  HENT:      Head: Normocephalic and atraumatic  Eyes:      General: Lids are normal  No scleral icterus  Conjunctiva/sclera: Conjunctivae normal    Neck:      Musculoskeletal: Neck supple  Pulmonary:      Effort: Pulmonary effort is normal  No tachypnea, accessory muscle usage or respiratory distress  Musculoskeletal:         General: Swelling present  Comments: Left knee swelling   Skin:     General: Skin is dry  Findings: No rash  Neurological:      Mental Status: She is alert  Psychiatric:         Behavior: Behavior normal  Behavior is cooperative  Reviewed labs and imaging  Imaging:   XR right hip 11/9/21  Right hip arthroplasty in alignment with no loosening      Left Knee MRI w/o contrast 7/22/19  IMPRESSION:  Given the recent aseptic joint aspiration and prior similar MRI findings in 2016, the current findings are most consistent with active inflammatory arthritis with articular synovitis and two areas of marginal osteitis (pre-erosive disease) at the posterior weightbearing aspect of the medial femoral condyle and posterior aspect of the lateral tibial plateau       Labs:   Admission on 10/25/2021, Discharged on 10/26/2021   Component Date Value Ref Range Status    EXT Preg Test, Ur 10/25/2021 Negative  Negative Final    Control 10/25/2021 Valid  Valid Final    ABO Grouping 10/25/2021 A   Final    Rh Factor 10/25/2021 Positive   Final    Antibody Screen 10/25/2021 Negative   Final    Specimen Expiration Date 10/25/2021 [id number]   Final    POC Glucose 10/25/2021 96  65 - 140 mg/dl Final    POC Glucose 10/25/2021 147* 65 - 140 mg/dl Final    WBC 10/26/2021 13 95* 4 31 - 10 16 Thousand/uL Final    RBC 10/26/2021 3 22* 3 81 - 5 12 Million/uL Final    Hemoglobin 10/26/2021 9 2* 11 5 - 15 4 g/dL Final    Hematocrit 10/26/2021 28 8* 34 8 - 46 1 % Final    MCV 10/26/2021 89  82 - 98 fL Final    MCH 10/26/2021 28 6  26 8 - 34 3 pg Final    MCHC 10/26/2021 31 9  31 4 - 37 4 g/dL Final    RDW 10/26/2021 16 2* 11 6 - 15 1 % Final    Platelets 62/12/7380 319  149 - 390 Thousands/uL Final    MPV 10/26/2021 9 0  8 9 - 12 7 fL Final    Sodium 10/26/2021 137  136 - 145 mmol/L Final    Potassium 10/26/2021 3 3* 3 5 - 5 3 mmol/L Final    Chloride 10/26/2021 104  100 - 108 mmol/L Final    CO2 10/26/2021 25  21 - 32 mmol/L Final    ANION GAP 10/26/2021 8  4 - 13 mmol/L Final    BUN 10/26/2021 5  5 - 25 mg/dL Final    Creatinine 10/26/2021 0 71  0 60 - 1 30 mg/dL Final    Standardized to IDMS reference method    Glucose 10/26/2021 136  65 - 140 mg/dL Final    If the patient is fasting, the ADA then defines impaired fasting glucose as > 100 mg/dL and diabetes as > or equal to 123 mg/dL  Specimen collection should occur prior to Sulfasalazine administration due to the potential for falsely depressed results  Specimen collection should occur prior to Sulfapyridine administration due to the potential for falsely elevated results   Calcium 10/26/2021 8 2* 8 3 - 10 1 mg/dL Final    eGFR 10/26/2021 119  ml/min/1 73sq m Final    Supplier Name 10/26/2021 [employer] [facility]   Final-Edited    Supplier Phone Number 10/26/2021 [phone]   Final-Edited    Order Status 10/26/2021 Delivery Successful   Final-Edited    Delivery Request Date 10/26/2021 10/26/2021   Final-Edited    Date Delivered  10/26/2021 10/26/2021   Final-Edited    Item Description 10/26/2021 Estella Zackary, Adult   Final-Edited    Qty: 1   Appointment on 10/19/2021   Component Date Value Ref Range Status    Hemoglobin A1C 10/19/2021 4 5  Normal 3 8-5 6%; PreDiabetic 5 7-6 4%; Diabetic >=6 5%; Glycemic control for adults with diabetes <7 0% % Final    EAG 10/19/2021 82  mg/dl Final    A/G Ratio 10/19/2021 1 11  1 10 - 1 80 Final    Albumin Electrophoresis 10/19/2021 52 6  52 0 - 65 0 % Final    Albumin CONC 10/19/2021 4 26  3 50 - 5 00 g/dl Final    Alpha 1 10/19/2021 4 9  2 5 - 5 0 % Final    ALPHA 1 CONC 10/19/2021 0 40  0 10 - 0 40 g/dL Final    Alpha 2 10/19/2021 9 4  7 0 - 13 0 % Final    ALPHA 2 CONC 10/19/2021 0 76  0 40 - 1 20 g/dL Final    Beta-1 10/19/2021 5 8  5 0 - 13 0 % Final    BETA 1 CONC 10/19/2021 0 47  0 40 - 0 80 g/dL Final    Beta-2 10/19/2021 6 8  2 0 - 8 0 % Final    BETA 2 CONC 10/19/2021 0 55* 0 20 - 0 50 g/dL Final    Gamma Globulin 10/19/2021 20 5  12 0 - 22 0 % Final    GAMMA CONC 10/19/2021 1 66* 0 50 - 1 60 g/dL Final    Total Protein 10/19/2021 8 1  6 4 - 8 2 g/dL Final    SPEP Interpretation 10/19/2021 See Comment   Final    No monoclonal bands noted  Reviewed by:  Roosevelt Bonds MD **Electronic Signature**    Total Protein 10/19/2021 8 8* 6 4 - 8 2 g/dL Final Orders Only on 10/19/2021   Component Date Value Ref Range Status    SARS-CoV-2 10/19/2021 Negative  Negative Final   Appointment on 09/07/2021   Component Date Value Ref Range Status    Protime 09/07/2021 14 1  11 6 - 14 5 seconds Final    INR 09/07/2021 1 14  0 84 - 1 19 Final    PTT 09/07/2021 37  23 - 37 seconds Final    Therapeutic Heparin Range =  60-90 seconds    ABO Grouping 09/07/2021 A   Final    Rh Factor 09/07/2021 Positive   Final    Antibody Screen 09/07/2021 Negative   Final    Specimen Expiration Date 09/07/2021 [id number]   Final    Iron Saturation 09/07/2021 12* 15 - 50 % Final    TIBC 09/07/2021 268  250 - 450 ug/dL Final    Iron 09/07/2021 32* 50 - 170 ug/dL Final    Patients treated with metal-binding drugs (ie  Deferoxamine) may have depressed iron values      Ferritin 09/07/2021 76  8 - 388 ng/mL Final    Retic Ct Abs 09/07/2021 69,200  81,060-03,145 Final    Retic Ct Pct 09/07/2021 1 52  0 37 - 1 87 % Final   Office Visit on 09/07/2021   Component Date Value Ref Range Status    Ventricular Rate 09/07/2021 104  BPM Final    Atrial Rate 09/07/2021 104  BPM Final    AZ Interval 09/07/2021 126  ms Final    QRSD Interval 09/07/2021 82  ms Final    QT Interval 09/07/2021 370  ms Final    QTC Interval 09/07/2021 486  ms Final    P Axis 09/07/2021 64  degrees Final    QRS Axis 09/07/2021 59  degrees Final    T Wave Ace 09/07/2021 19  degrees Final

## 2022-02-22 ENCOUNTER — OFFICE VISIT (OUTPATIENT)
Dept: RHEUMATOLOGY | Facility: CLINIC | Age: 27
End: 2022-02-22
Payer: COMMERCIAL

## 2022-02-22 VITALS
SYSTOLIC BLOOD PRESSURE: 124 MMHG | DIASTOLIC BLOOD PRESSURE: 80 MMHG | HEART RATE: 82 BPM | BODY MASS INDEX: 22.66 KG/M2 | HEIGHT: 65 IN | WEIGHT: 136 LBS

## 2022-02-22 DIAGNOSIS — M19.90 INFLAMMATORY ARTHRITIS: ICD-10-CM

## 2022-02-22 DIAGNOSIS — M25.562 LEFT KNEE PAIN, UNSPECIFIED CHRONICITY: ICD-10-CM

## 2022-02-22 DIAGNOSIS — Z79.899 HIGH RISK MEDICATION USE: ICD-10-CM

## 2022-02-22 DIAGNOSIS — M25.462 EFFUSION OF LEFT KNEE: ICD-10-CM

## 2022-02-22 DIAGNOSIS — R79.89 LOW VITAMIN D LEVEL: ICD-10-CM

## 2022-02-22 DIAGNOSIS — M02.362 REACTIVE ARTHRITIS OF LEFT KNEE (HCC): Primary | ICD-10-CM

## 2022-02-22 PROCEDURE — 99214 OFFICE O/P EST MOD 30 MIN: CPT | Performed by: INTERNAL MEDICINE

## 2022-02-22 PROCEDURE — 20610 DRAIN/INJ JOINT/BURSA W/O US: CPT | Performed by: INTERNAL MEDICINE

## 2022-02-22 RX ORDER — NAPROXEN 500 MG/1
500 TABLET ORAL 2 TIMES DAILY WITH MEALS
Qty: 60 TABLET | Refills: 6 | Status: SHIPPED | OUTPATIENT
Start: 2022-02-22

## 2022-02-22 RX ORDER — TRIAMCINOLONE ACETONIDE 40 MG/ML
40 INJECTION, SUSPENSION INTRA-ARTICULAR; INTRAMUSCULAR ONCE
Status: COMPLETED | OUTPATIENT
Start: 2022-02-22 | End: 2022-02-22

## 2022-02-22 RX ORDER — SULFASALAZINE 500 MG/1
1000 TABLET ORAL 2 TIMES DAILY
Qty: 360 TABLET | Refills: 1 | Status: SHIPPED | OUTPATIENT
Start: 2022-02-22

## 2022-02-22 RX ORDER — HYDROXYCHLOROQUINE SULFATE 200 MG/1
300 TABLET, FILM COATED ORAL DAILY
Qty: 135 TABLET | Refills: 1 | Status: SHIPPED | OUTPATIENT
Start: 2022-02-22 | End: 2022-08-21

## 2022-02-22 RX ORDER — LIDOCAINE HYDROCHLORIDE 10 MG/ML
1 INJECTION, SOLUTION INFILTRATION; PERINEURAL ONCE
Status: COMPLETED | OUTPATIENT
Start: 2022-02-22 | End: 2022-02-22

## 2022-02-22 RX ADMIN — TRIAMCINOLONE ACETONIDE 40 MG: 40 INJECTION, SUSPENSION INTRA-ARTICULAR; INTRAMUSCULAR at 10:14

## 2022-02-22 RX ADMIN — LIDOCAINE HYDROCHLORIDE 1 ML: 10 INJECTION, SOLUTION INFILTRATION; PERINEURAL at 10:14

## 2022-02-22 NOTE — PATIENT INSTRUCTIONS
Do labs  Left knee steroid injection given in clinic today  Continue diclofenac gel as needed for joint pain  Continue sulfasalazine 2 tabs twice a day  Continue naproxen as needed - can take twice a day with food as needed for joint pain  Start hydroxychloroquine one and a half tabs daily; get regular eye exams    Return to clinic in 3-4 months    Reactive Arthritis    What is reactive arthritis? -- Reactive arthritis is a kind of arthritis that happens after certain infections  It causes pain and swelling in joints  Reactive arthritis usually affects people who have or just had:  ?Food poisoning or another kind of infection of the intestines  ? An infection that you catch through sex   In the past, reactive arthritis was sometimes called "Cornelio syndrome "  What are the symptoms of reactive arthritis? -- The main symptoms of reactive arthritis are pain and swelling in the joints  These usually happen 1 to 4 weeks after an infection  In most cases, the symptoms affect only a few joints, usually in the knees, ankles, or feet  Other symptoms might include:  ?Pain in the tendons in the feet and ankles (tendons are tough bands of tissue that connect muscles to bones)  ? Irritation of the eye called "conjunctivitis" (also known as pink eye)  ? Pain when urinating  Is there a test for reactive arthritis? -- No  There is no test  But if your doctor or nurse can figure out what type of germ caused your infection, he or she should be able to tell if you have reactive arthritis  Your doctor or nurse can test your stool (bowel movements) or urine to look for certain kinds of germs  If your doctor or nurse can't tell what germs caused your infection, he or she will study your symptoms to decide how likely it is that you have reactive arthritis  How is reactive arthritis treated? -- The symptoms of reactive arthritis are treated with medicines, including:  ? NSAIDs - This is a large group of medicines that includes ibuprofen (sample brand names: Advil, Motrin) and naproxen (sample brand name: Aleve)  Your doctor or nurse might prescribe a dose higher than you would normally take to relieve pain  ? Other medicines - If NSAIDs do not help your symptoms, your doctor or nurse might give you a shot of steroids  Steroids help reduce inflammation  These are not the same as the steroids some athletes take illegally  Your doctor might also give you more steroids to take at home  There are also other medicines that might help if your symptoms do not get better with NSAIDs or steroids  ? Eye drops - Special drops can help relieve redness and irritation in your eyes  But if you have eye pain or trouble seeing, visit an eye doctor to make sure you don't have a more serious problem  Antibiotics do not usually help with the joint symptoms of reactive arthritis  Even so, your doctor or nurse might prescribe them if you still have an infection  When will I feel better? -- Most people with reactive arthritis get better quickly  Some people continue to notice symptoms, either constantly or just once in a while  If your back gets very stiff and sore, see your doctor or nurse   This might mean that your reactive arthritis has turned into a more serious problem

## 2022-04-05 ENCOUNTER — OFFICE VISIT (OUTPATIENT)
Dept: FAMILY MEDICINE CLINIC | Facility: CLINIC | Age: 27
End: 2022-04-05
Payer: COMMERCIAL

## 2022-04-05 VITALS
TEMPERATURE: 97.4 F | OXYGEN SATURATION: 96 % | WEIGHT: 137 LBS | SYSTOLIC BLOOD PRESSURE: 110 MMHG | DIASTOLIC BLOOD PRESSURE: 70 MMHG | BODY MASS INDEX: 22.82 KG/M2 | HEART RATE: 99 BPM | RESPIRATION RATE: 16 BRPM | HEIGHT: 65 IN

## 2022-04-05 DIAGNOSIS — N30.01 ACUTE CYSTITIS WITH HEMATURIA: Primary | ICD-10-CM

## 2022-04-05 DIAGNOSIS — R35.0 FREQUENCY OF URINATION: ICD-10-CM

## 2022-04-05 DIAGNOSIS — F34.1 DYSTHYMIA: ICD-10-CM

## 2022-04-05 LAB
SL AMB  POCT GLUCOSE, UA: ABNORMAL
SL AMB LEUKOCYTE ESTERASE,UA: ABNORMAL
SL AMB POCT BILIRUBIN,UA: 1
SL AMB POCT BLOOD,UA: ABNORMAL
SL AMB POCT CLARITY,UA: ABNORMAL
SL AMB POCT COLOR,UA: ABNORMAL
SL AMB POCT KETONES,UA: 5
SL AMB POCT NITRITE,UA: ABNORMAL
SL AMB POCT PH,UA: 6.5
SL AMB POCT SPECIFIC GRAVITY,UA: 1.01
SL AMB POCT URINE PROTEIN: 15
SL AMB POCT UROBILINOGEN: 0.2

## 2022-04-05 PROCEDURE — 81002 URINALYSIS NONAUTO W/O SCOPE: CPT | Performed by: NURSE PRACTITIONER

## 2022-04-05 PROCEDURE — 87086 URINE CULTURE/COLONY COUNT: CPT | Performed by: NURSE PRACTITIONER

## 2022-04-05 PROCEDURE — 99214 OFFICE O/P EST MOD 30 MIN: CPT | Performed by: NURSE PRACTITIONER

## 2022-04-05 PROCEDURE — 87077 CULTURE AEROBIC IDENTIFY: CPT | Performed by: NURSE PRACTITIONER

## 2022-04-05 RX ORDER — NITROFURANTOIN 25; 75 MG/1; MG/1
100 CAPSULE ORAL 2 TIMES DAILY
Qty: 10 CAPSULE | Refills: 0 | Status: SHIPPED | OUTPATIENT
Start: 2022-04-05 | End: 2022-04-10

## 2022-04-05 NOTE — PATIENT INSTRUCTIONS
Start medication, this is one pill twice a day  We will call with urine culture results  Pursue therapy  How to find a therapist:  You can call the back of your insurance card for covered providers or check on your insurance's website  Or you can visit [facility] [employer] [facility] to find a covered therapist  Cori Knott can filter by your insurance type, location, and other preferences  This will give you a list with pictures and bios about the therapists so you can appropriately choose one you feel will be the most helpful for your goals  There are multiple resources available to help with mental health when you need to speak to someone  There is the warm line  Kenneth Fontenot is a confidential 7 days/week telephone support service manned by trained mental health consumers  Warmline operates daily but is not able to accept calls between 2AM-6AM          · [facility] provides support, a listening ear and can provide information about available services  · [facility] specializes in the concerns of mental health consumers, their families and friends  However, we are also here for anyone who has a mental health concern, is confused about or just doesn't know anything about mental health or where to get information  · To reach Kenneth Fontenot, call [phone] accepts calls between 6:00 AM to 10:00 AM and from 4:00 PM to 12:00 AM     Warm Line:    Then there is the suicide hotline which is available for more urgent situations when you are having thoughts of hurting yourself or are having worsening symptoms  If you are thinking about hurting yourself you should tell a friend or family member immediately so they can help, and call 911 or go the ER so you can get the resources you need  The National Suicide Prevention Lifeline is a national network of local crisis centers that provides free and confidential emotional support to people in suicidal crisis or emotional distress 24 hours a day, 7 days a week   We're committed to improving crisis services and advancing suicide prevention by empowering individuals, advancing professional best practices, and building awareness  Suicide Hotline: Call 2-295.275.3956  This is available 24/7      Patrick Wu is a service of the [address] [place] [address], connecting individuals with counselors for emotional support and other services via [facility] chat centers in the [address] [place] [address] are accredited by [address]  Patrick Wu is available 24/7 across the [place]     http://suicidepreventionlifeline org/chat/    Please call the office if you are experiencing any worsening of symptoms or no symptom improvement

## 2022-04-05 NOTE — PROGRESS NOTES
Assessment/Plan:   Diagnosis ICD-10-CM Associated Orders   1  Acute cystitis with hematuria  N30 01 nitrofurantoin (MACROBID) 100 mg capsule   2  Frequency of urination  R35 0 POCT urine dip     Urine culture   3  Dysthymia  F34 1      Start medication, this is one pill twice a day  We will call with urine culture results  Red flag symptoms reviewed  Pursue therapy  Discussed depressive symptoms, does not feel she needs medication at this time and will pursue therapy  How to find a therapist:  You can call the back of your insurance card for covered providers or check on your insurance's website  Or you can visit [employer] to find a covered therapist  Jie Santillan can filter by your insurance type, location, and other preferences  This will give you a list with pictures and bios about the therapists so you can appropriately choose one you feel will be the most helpful for your goals  There are multiple resources available to help with mental health when you need to speak to someone  There is the warm line  Yanet Bryan is a confidential 7 days/week telephone support service manned by trained mental health consumers  [facility] operates daily but is not able to accept calls between 2AM-6AM          · [facility] provides support, a listening ear and can provide information about available services  · [facility] specializes in the concerns of mental health consumers, their families and friends  However, we are also here for anyone who has a mental health concern, is confused about or just doesn't know anything about mental health or where to get information  · To reach Yanet Bryan, call [phone] accepts calls between 6:00 AM to 10:00 AM and from 4:00 PM to 12:00 AM     Warm Line:  Then there is the suicide hotline which is available for more urgent situations when you are having thoughts of hurting yourself or are having worsening symptoms   If you are thinking about hurting yourself you should tell a friend or family member immediately so they can help, and call 46 or go the ER so you can get the resources you need  The National Suicide Prevention Lifeline is a national network of local crisis centers that provides free and confidential emotional support to people in suicidal crisis or emotional distress 24 hours a day, 7 days a week  We're committed to improving crisis services and advancing suicide prevention by empowering individuals, advancing professional best practices, and building awareness  Suicide Hotline: Call 3-478.841.7508  This is available 24/7  [facility] is a service of the [address] [place] [address], connecting individuals with counselors for emotional support and other services via [employer]  All chat centers in the [address] [place] [address] are accredited by [employer] [address]  [facility] is available 24/7 across the  S   http://suicidepreventionlifeline org/chat/  Please call the office if you are experiencing any worsening of symptoms or no symptom improvement  Advised to call the office for any worsening of symptoms or no symptom improvement  Patient verbalizes understand and agrees with treatment plan  Diagnoses and all orders for this visit:    Acute cystitis with hematuria  -     nitrofurantoin (MACROBID) 100 mg capsule; Take 1 capsule (100 mg total) by mouth 2 (two) times a day for 5 days    Frequency of urination  -     POCT urine dip  -     Urine culture    Dysthymia                Subjective:        Patient ID: Lorrie Grant is a 23 y o  female  Chief Complaint   Patient presents with    Possible UTI     patient states she has discomfort, frequency, urine looks cloudy since 4/03/2022       Patient presents with:  Possible UTI: patient states she has discomfort, frequency, urine looks cloudy since 4/03/2022  She took the azo urinary relief for the symptoms  Has not had UTI since high school  No fever/chills         The following portions of the patient's history were reviewed and updated as appropriate: allergies, current medications, past family history, past social history and problem list     Review of Systems   Constitutional: Negative for chills and fever  Eyes: Negative for discharge  Respiratory: Negative for shortness of breath  Cardiovascular: Negative for chest pain  Gastrointestinal: Negative for constipation and diarrhea  Genitourinary: Positive for dysuria, frequency, pelvic pain and urgency  Negative for difficulty urinating, hematuria, vaginal bleeding, vaginal discharge and vaginal pain  Musculoskeletal: Positive for back pain (mild)  Negative for joint swelling  Skin: Negative for rash  Neurological: Negative for headaches  Hematological: Negative for adenopathy  Psychiatric/Behavioral: The patient is not nervous/anxious  Objective:  /70 (BP Location: Left arm, Patient Position: Sitting, Cuff Size: Adult)   Pulse 99   Temp (!) 97 4 °F (36 3 °C) (Temporal)   Resp 16   Ht 5' 5" (1 651 m)   Wt 62 1 kg (137 lb)   LMP 03/28/2022 (Exact Date)   SpO2 96%   BMI 22 80 kg/m²      Physical Exam  Vitals and nursing note reviewed  Constitutional:       General: She is not in acute distress  Appearance: She is well-developed  She is not diaphoretic  HENT:      Head: Normocephalic and atraumatic  Right Ear: External ear normal       Left Ear: External ear normal    Eyes:      General: Lids are normal          Right eye: No discharge  Left eye: No discharge  Conjunctiva/sclera: Conjunctivae normal    Cardiovascular:      Rate and Rhythm: Normal rate and regular rhythm  Heart sounds: No murmur heard  Pulmonary:      Effort: Pulmonary effort is normal  No respiratory distress  Breath sounds: Normal breath sounds  No wheezing  Abdominal:      Tenderness: There is no right CVA tenderness or left CVA tenderness  Musculoskeletal:         General: No deformity  Cervical back: Neck supple  Skin:     General: Skin is warm and dry  Neurological:      Mental Status: She is alert and oriented to person, place, and time  Psychiatric:         Speech: Speech normal          Behavior: Behavior normal          Thought Content: Thought content normal          Judgment: Judgment normal              Depression Screening and Follow-up Plan: Patient's depression screening was positive with a PHQ-2 score of 3  Their PHQ-9 score was 6  Patient assessed for underlying major depression  Brief counseling provided and recommend additional follow-up/re-evaluation next office visit           Current Outpatient Medications:     ascorbic acid (VITAMIN C) 500 MG tablet, Take 1 tablet (500 mg total) by mouth daily, Disp: 60 tablet, Rfl: 0    Diclofenac Sodium (VOLTAREN) 1 %, Apply 2 g topically 4 (four) times a day, Disp: 600 g, Rfl: 1    hydroxychloroquine (PLAQUENIL) 200 mg tablet, Take 1 5 tablets (300 mg total) by mouth in the morning, Disp: 135 tablet, Rfl: 1    Multiple Vitamin (multivitamin) capsule, Take 1 capsule by mouth daily, Disp: , Rfl:     naproxen (NAPROSYN) 500 mg tablet, Take 1 tablet (500 mg total) by mouth 2 (two) times a day with meals As needed for joint pain, Disp: 60 tablet, Rfl: 6    sulfaSALAzine (AZULFIDINE) 500 mg tablet, Take 2 tablets (1,000 mg total) by mouth 2 (two) times a day Total of 4 tabs in a day, Disp: 360 tablet, Rfl: 1    ferrous sulfate 324 (65 Fe) mg, Take 1 tablet (324 mg total) by mouth 2 (two) times a day before meals (Patient not taking: Reported on 2/22/2022 ), Disp: 60 tablet, Rfl: 0    nitrofurantoin (MACROBID) 100 mg capsule, Take 1 capsule (100 mg total) by mouth 2 (two) times a day for 5 days, Disp: 10 capsule, Rfl: 0  No Known Allergies

## 2022-04-06 LAB
BACTERIA UR CULT: ABNORMAL

## (undated) DEVICE — PENCIL ELECTROSURG E-Z CLEAN -0035H

## (undated) DEVICE — 3M™ IOBAN™ 2 ANTIMICROBIAL INCISE DRAPE 6650EZ: Brand: IOBAN™ 2

## (undated) DEVICE — SUT VICRYL 0 CT-1 36 IN J946H

## (undated) DEVICE — GLOVE SRG BIOGEL ECLIPSE 7

## (undated) DEVICE — SUT VICRYL 2-0 CT-1 27 IN J259H

## (undated) DEVICE — 3M™ TEGADERM™ TRANSPARENT FILM DRESSING FRAME STYLE, 1628, 6 IN X 8 IN (15 CM X 20 CM), 10/CT 8CT/CASE: Brand: 3M™ TEGADERM™

## (undated) DEVICE — ELECTRODE BLADE E-Z CLEAN 4IN -0014A

## (undated) DEVICE — CAPIT HIP COP - ACTIS ONLY

## (undated) DEVICE — CHLORAPREP HI-LITE 26ML ORANGE

## (undated) DEVICE — DRAPE CAMERA/LASER

## (undated) DEVICE — DUAL CUT SAGITTAL BLADE

## (undated) DEVICE — SUT MONOCRYL 4-0 PS-2 27 IN Y426H

## (undated) DEVICE — SUT PLAIN 3-0 CT-1 27 IN 842H

## (undated) DEVICE — DRAPE C-ARM X-RAY

## (undated) DEVICE — BETHLEHEM UNIV MAJOR ORTHO,KIT: Brand: CARDINAL HEALTH

## (undated) DEVICE — DRAPE SHEET THREE QUARTER

## (undated) DEVICE — GLOVE SRG BIOGEL 8

## (undated) DEVICE — THE SIMPULSE SOLO SYSTEM WITH ULTREX RETRACTABLE SPLASH SHIELD TIP: Brand: SIMPULSE SOLO

## (undated) DEVICE — ABDOMINAL PAD: Brand: DERMACEA

## (undated) DEVICE — SYRINGE 20ML LL

## (undated) DEVICE — SUT STRATAFIX SPIRAL PDS PLUS 1 CTX 18 IN SXPP1A400

## (undated) DEVICE — ADHESIVE SKIN HIGH VISCOSITY EXOFIN 1ML

## (undated) DEVICE — SUT MONOCRYL 3-0 PS-2 18 IN Y497G

## (undated) DEVICE — DISPOSABLE EQUIPMENT COVER: Brand: SMALL TOWEL DRAPE

## (undated) DEVICE — PACK C-SECTION PBDS

## (undated) DEVICE — LIGHT HANDLE COVER SLEEVE DISP BLUE STELLAR

## (undated) DEVICE — 40601 PROLONGED POSITIONING SYSTEM: Brand: 40601 PROLONGED POSITIONING SYSTEM

## (undated) DEVICE — CAPIT HIP UPCHRG  GRIPTION CUP

## (undated) DEVICE — GAUZE SPONGES,16 PLY: Brand: CURITY

## (undated) DEVICE — SPONGE PVP SCRUB WING STERILE

## (undated) DEVICE — SUT MONOCRYL 0 CTX 36 IN Y398H

## (undated) DEVICE — PAD GROUNDING ADULT

## (undated) DEVICE — SKIN MARKER DUAL TIP WITH RULER CAP, FLEXIBLE RULER AND LABELS: Brand: DEVON

## (undated) DEVICE — IMPERVIOUS STOCKINETTE: Brand: DEROYAL

## (undated) DEVICE — OSCILLATING TIP SAW CARTRIDGE: Brand: PRECISION FALCON

## (undated) DEVICE — GLOVE INDICATOR PI UNDERGLOVE SZ 8.5 BLUE

## (undated) DEVICE — HEAVY DUTY TABLE COVER: Brand: CONVERTORS

## (undated) DEVICE — HOOD: Brand: FLYTE, SURGICOOL

## (undated) DEVICE — Device

## (undated) DEVICE — GLOVE INDICATOR PI UNDERGLOVE SZ 7.5 BLUE

## (undated) DEVICE — NEEDLE 22 G X 1 1/2 SAFETY